# Patient Record
Sex: FEMALE | Race: WHITE | NOT HISPANIC OR LATINO | Employment: OTHER | URBAN - METROPOLITAN AREA
[De-identification: names, ages, dates, MRNs, and addresses within clinical notes are randomized per-mention and may not be internally consistent; named-entity substitution may affect disease eponyms.]

---

## 2017-08-03 ENCOUNTER — HOSPITAL ENCOUNTER (OUTPATIENT)
Dept: FAMILY MEDICINE CLINIC | Facility: CLINIC | Age: 62
Setting detail: SPECIMEN
Discharge: HOME | End: 2017-08-03
Attending: PREVENTIVE MEDICINE | Admitting: PREVENTIVE MEDICINE

## 2017-08-03 LAB
ALBUMIN SERPL-MCNC: 4.1 G/DL (ref 3.5–4.8)
ALBUMIN/GLOB SERPL: 1.4 {RATIO} (ref 1–1.7)
ALP SERPL-CCNC: 78 IU/L (ref 32–91)
ALT SERPL-CCNC: 34 IU/L (ref 14–54)
ANION GAP SERPL CALC-SCNC: 12.4 MMOL/L (ref 10–20)
AST SERPL-CCNC: 25 IU/L (ref 15–41)
BASOPHILS # BLD AUTO: 0 10*3/UL (ref 0–0.2)
BASOPHILS NFR BLD AUTO: 1 % (ref 0–2)
BILIRUB SERPL-MCNC: 0.6 MG/DL (ref 0.3–1.2)
BUN SERPL-MCNC: 11 MG/DL (ref 8–20)
BUN/CREAT SERPL: 12.2 (ref 5.4–26.2)
CALCIUM SERPL-MCNC: 10.1 MG/DL (ref 8.9–10.3)
CHLORIDE SERPL-SCNC: 102 MMOL/L (ref 101–111)
CONV CO2: 29 MMOL/L (ref 22–32)
CONV TOTAL PROTEIN: 7.1 G/DL (ref 6.1–7.9)
CREAT UR-MCNC: 0.9 MG/DL (ref 0.4–1)
DIFFERENTIAL METHOD BLD: (no result)
EOSINOPHIL # BLD AUTO: 0.1 10*3/UL (ref 0–0.3)
EOSINOPHIL # BLD AUTO: 1 % (ref 0–3)
ERYTHROCYTE [DISTWIDTH] IN BLOOD BY AUTOMATED COUNT: 13.9 % (ref 11.5–14.5)
GLOBULIN UR ELPH-MCNC: 3 G/DL (ref 2.5–3.8)
GLUCOSE SERPL-MCNC: 95 MG/DL (ref 65–99)
HCT VFR BLD AUTO: 40.6 % (ref 35–49)
HGB BLD-MCNC: 13.5 G/DL (ref 12–15)
LYMPHOCYTES # BLD AUTO: 1.7 10*3/UL (ref 0.8–4.8)
LYMPHOCYTES NFR BLD AUTO: 24 % (ref 18–42)
MCH RBC QN AUTO: 31.7 PG (ref 26–32)
MCHC RBC AUTO-ENTMCNC: 33.2 G/DL (ref 32–36)
MCV RBC AUTO: 95.3 FL (ref 80–94)
MONOCYTES # BLD AUTO: 0.7 10*3/UL (ref 0.1–1.3)
MONOCYTES NFR BLD AUTO: 10 % (ref 2–11)
NEUTROPHILS # BLD AUTO: 4.4 10*3/UL (ref 2.3–8.6)
NEUTROPHILS NFR BLD AUTO: 64 % (ref 50–75)
NRBC BLD AUTO-RTO: 0 /100{WBCS}
NRBC/RBC NFR BLD MANUAL: 0 10*3/UL
PLATELET # BLD AUTO: 291 10*3/UL (ref 150–450)
PMV BLD AUTO: 9.6 FL (ref 7.4–10.4)
POTASSIUM SERPL-SCNC: 5.4 MMOL/L (ref 3.6–5.1)
RBC # BLD AUTO: 4.26 10*6/UL (ref 4–5.4)
SODIUM SERPL-SCNC: 138 MMOL/L (ref 136–144)
WBC # BLD AUTO: 6.9 10*3/UL (ref 4.5–11.5)

## 2017-08-09 ENCOUNTER — HOSPITAL ENCOUNTER (OUTPATIENT)
Dept: FAMILY MEDICINE CLINIC | Facility: CLINIC | Age: 62
Setting detail: SPECIMEN
Discharge: HOME | End: 2017-08-09
Attending: PREVENTIVE MEDICINE | Admitting: PREVENTIVE MEDICINE

## 2017-08-09 LAB — POTASSIUM SERPL-SCNC: 5.3 MMOL/L (ref 3.6–5.1)

## 2017-11-20 ENCOUNTER — HOSPITAL ENCOUNTER (OUTPATIENT)
Dept: PAIN MEDICINE | Facility: HOSPITAL | Age: 62
Discharge: HOME | End: 2017-11-20
Attending: ANESTHESIOLOGY | Admitting: ANESTHESIOLOGY

## 2018-01-08 ENCOUNTER — HOSPITAL ENCOUNTER (OUTPATIENT)
Dept: PAIN MEDICINE | Facility: HOSPITAL | Age: 63
Discharge: HOME | End: 2018-01-08
Attending: ANESTHESIOLOGY | Admitting: ANESTHESIOLOGY

## 2018-03-12 ENCOUNTER — HOSPITAL ENCOUNTER (OUTPATIENT)
Dept: PAIN MEDICINE | Facility: HOSPITAL | Age: 63
Discharge: HOME | End: 2018-03-12
Attending: ANESTHESIOLOGY | Admitting: ANESTHESIOLOGY

## 2018-04-23 ENCOUNTER — HOSPITAL ENCOUNTER (OUTPATIENT)
Dept: PAIN MEDICINE | Facility: HOSPITAL | Age: 63
Discharge: HOME | End: 2018-04-23
Attending: ANESTHESIOLOGY | Admitting: ANESTHESIOLOGY

## 2018-06-18 ENCOUNTER — HOSPITAL ENCOUNTER (OUTPATIENT)
Dept: PAIN MEDICINE | Facility: HOSPITAL | Age: 63
Discharge: HOME | End: 2018-06-18
Attending: ANESTHESIOLOGY | Admitting: ANESTHESIOLOGY

## 2018-07-17 ENCOUNTER — HOSPITAL ENCOUNTER (OUTPATIENT)
Dept: FAMILY MEDICINE CLINIC | Facility: CLINIC | Age: 63
Setting detail: SPECIMEN
Discharge: HOME | End: 2018-07-17
Attending: PREVENTIVE MEDICINE | Admitting: PREVENTIVE MEDICINE

## 2018-07-17 LAB
ALBUMIN SERPL-MCNC: 4.1 G/DL (ref 3.5–4.8)
ALBUMIN/GLOB SERPL: 1.6 {RATIO} (ref 1–1.7)
ALP SERPL-CCNC: 84 IU/L (ref 32–91)
ALT SERPL-CCNC: 30 IU/L (ref 14–54)
ANION GAP SERPL CALC-SCNC: 13.8 MMOL/L (ref 10–20)
AST SERPL-CCNC: 22 IU/L (ref 15–41)
BASOPHILS # BLD AUTO: 0.1 10*3/UL (ref 0–0.2)
BASOPHILS NFR BLD AUTO: 1 % (ref 0–2)
BILIRUB SERPL-MCNC: 0.6 MG/DL (ref 0.3–1.2)
BUN SERPL-MCNC: 10 MG/DL (ref 8–20)
BUN/CREAT SERPL: 10 (ref 5.4–26.2)
CALCIUM SERPL-MCNC: 9.6 MG/DL (ref 8.9–10.3)
CHLORIDE SERPL-SCNC: 100 MMOL/L (ref 101–111)
CHOLEST SERPL-MCNC: 127 MG/DL
CHOLEST/HDLC SERPL: 3.7 {RATIO}
CONV CO2: 27 MMOL/L (ref 22–32)
CONV LDL CHOLESTEROL DIRECT: 66 MG/DL (ref 0–100)
CONV TOTAL PROTEIN: 6.7 G/DL (ref 6.1–7.9)
CREAT UR-MCNC: 1 MG/DL (ref 0.4–1)
DIFFERENTIAL METHOD BLD: (no result)
EOSINOPHIL # BLD AUTO: 0.1 10*3/UL (ref 0–0.3)
EOSINOPHIL # BLD AUTO: 2 % (ref 0–3)
ERYTHROCYTE [DISTWIDTH] IN BLOOD BY AUTOMATED COUNT: 12.9 % (ref 11.5–14.5)
GLOBULIN UR ELPH-MCNC: 2.6 G/DL (ref 2.5–3.8)
GLUCOSE SERPL-MCNC: 87 MG/DL (ref 65–99)
HCT VFR BLD AUTO: 40.3 % (ref 35–49)
HDLC SERPL-MCNC: 34 MG/DL
HGB BLD-MCNC: 13.3 G/DL (ref 12–15)
LDLC/HDLC SERPL: 1.9 {RATIO}
LIPID INTERPRETATION: ABNORMAL
LYMPHOCYTES # BLD AUTO: 2.4 10*3/UL (ref 0.8–4.8)
LYMPHOCYTES NFR BLD AUTO: 27 % (ref 18–42)
MCH RBC QN AUTO: 31.2 PG (ref 26–32)
MCHC RBC AUTO-ENTMCNC: 33 G/DL (ref 32–36)
MCV RBC AUTO: 94.6 FL (ref 80–94)
MONOCYTES # BLD AUTO: 0.7 10*3/UL (ref 0.1–1.3)
MONOCYTES NFR BLD AUTO: 8 % (ref 2–11)
NEUTROPHILS # BLD AUTO: 5.5 10*3/UL (ref 2.3–8.6)
NEUTROPHILS NFR BLD AUTO: 62 % (ref 50–75)
NRBC BLD AUTO-RTO: 0 /100{WBCS}
NRBC/RBC NFR BLD MANUAL: 0 10*3/UL
PLATELET # BLD AUTO: 340 10*3/UL (ref 150–450)
PMV BLD AUTO: 9.4 FL (ref 7.4–10.4)
POTASSIUM SERPL-SCNC: 3.8 MMOL/L (ref 3.6–5.1)
RBC # BLD AUTO: 4.26 10*6/UL (ref 4–5.4)
SODIUM SERPL-SCNC: 137 MMOL/L (ref 136–144)
TRIGL SERPL-MCNC: 203 MG/DL
VLDLC SERPL CALC-MCNC: 26.9 MG/DL
WBC # BLD AUTO: 8.7 10*3/UL (ref 4.5–11.5)

## 2018-07-18 LAB
CONV HIV-1/ HIV-2: NORMAL
CONV HIV-1/ HIV-2: NORMAL
HCV AB SER DONR QL: NORMAL
HCV AB SER DONR QL: NORMAL

## 2018-07-30 ENCOUNTER — HOSPITAL ENCOUNTER (OUTPATIENT)
Dept: PAIN MEDICINE | Facility: HOSPITAL | Age: 63
Discharge: HOME | End: 2018-07-30
Attending: ANESTHESIOLOGY | Admitting: ANESTHESIOLOGY

## 2018-08-30 ENCOUNTER — HOSPITAL ENCOUNTER (OUTPATIENT)
Dept: FAMILY MEDICINE CLINIC | Facility: CLINIC | Age: 63
Setting detail: SPECIMEN
Discharge: HOME | End: 2018-08-30
Attending: PREVENTIVE MEDICINE | Admitting: PREVENTIVE MEDICINE

## 2018-08-30 LAB — CREAT UR-MCNC: 1 MG/DL (ref 0.4–1)

## 2018-09-17 ENCOUNTER — HOSPITAL ENCOUNTER (OUTPATIENT)
Dept: PAIN MEDICINE | Facility: HOSPITAL | Age: 63
Discharge: HOME | End: 2018-09-17
Attending: ANESTHESIOLOGY | Admitting: ANESTHESIOLOGY

## 2018-11-12 ENCOUNTER — HOSPITAL ENCOUNTER (OUTPATIENT)
Dept: PAIN MEDICINE | Facility: HOSPITAL | Age: 63
Discharge: HOME | End: 2018-11-12
Attending: ANESTHESIOLOGY | Admitting: ANESTHESIOLOGY

## 2019-01-07 ENCOUNTER — HOSPITAL ENCOUNTER (OUTPATIENT)
Dept: PAIN MEDICINE | Facility: HOSPITAL | Age: 64
Discharge: HOME | End: 2019-01-07
Attending: ANESTHESIOLOGY | Admitting: ANESTHESIOLOGY

## 2019-01-14 ENCOUNTER — HOSPITAL ENCOUNTER (OUTPATIENT)
Dept: FAMILY MEDICINE CLINIC | Facility: CLINIC | Age: 64
Setting detail: SPECIMEN
Discharge: HOME | End: 2019-01-14
Attending: PREVENTIVE MEDICINE | Admitting: PREVENTIVE MEDICINE

## 2019-01-14 LAB
ALBUMIN SERPL-MCNC: 4.1 G/DL (ref 3.5–4.8)
ALBUMIN/GLOB SERPL: 1.4 {RATIO} (ref 1–1.7)
ALP SERPL-CCNC: 74 IU/L (ref 32–91)
ALT SERPL-CCNC: 34 IU/L (ref 14–54)
ANION GAP SERPL CALC-SCNC: 14 MMOL/L (ref 10–20)
AST SERPL-CCNC: 28 IU/L (ref 15–41)
BASOPHILS # BLD AUTO: 0.1 10*3/UL (ref 0–0.2)
BASOPHILS NFR BLD AUTO: 1 % (ref 0–2)
BILIRUB SERPL-MCNC: 0.5 MG/DL (ref 0.3–1.2)
BUN SERPL-MCNC: 19 MG/DL (ref 8–20)
BUN/CREAT SERPL: 19 (ref 5.4–26.2)
CALCIUM SERPL-MCNC: 9.9 MG/DL (ref 8.9–10.3)
CHLORIDE SERPL-SCNC: 102 MMOL/L (ref 101–111)
CHOLEST SERPL-MCNC: 135 MG/DL
CHOLEST/HDLC SERPL: 3.2 {RATIO}
CONV CO2: 25 MMOL/L (ref 22–32)
CONV LDL CHOLESTEROL DIRECT: 75 MG/DL (ref 0–100)
CONV TOTAL PROTEIN: 7.1 G/DL (ref 6.1–7.9)
CREAT UR-MCNC: 1 MG/DL (ref 0.4–1)
DIFFERENTIAL METHOD BLD: (no result)
EOSINOPHIL # BLD AUTO: 0.1 10*3/UL (ref 0–0.3)
EOSINOPHIL # BLD AUTO: 1 % (ref 0–3)
ERYTHROCYTE [DISTWIDTH] IN BLOOD BY AUTOMATED COUNT: 13.7 % (ref 11.5–14.5)
GLOBULIN UR ELPH-MCNC: 3 G/DL (ref 2.5–3.8)
GLUCOSE SERPL-MCNC: 95 MG/DL (ref 65–99)
HCT VFR BLD AUTO: 41.4 % (ref 35–49)
HDLC SERPL-MCNC: 42 MG/DL
HGB BLD-MCNC: 13.9 G/DL (ref 12–15)
LDLC/HDLC SERPL: 1.8 {RATIO}
LIPID INTERPRETATION: ABNORMAL
LYMPHOCYTES # BLD AUTO: 1.7 10*3/UL (ref 0.8–4.8)
LYMPHOCYTES NFR BLD AUTO: 16 % (ref 18–42)
MCH RBC QN AUTO: 32 PG (ref 26–32)
MCHC RBC AUTO-ENTMCNC: 33.7 G/DL (ref 32–36)
MCV RBC AUTO: 95.1 FL (ref 80–94)
MONOCYTES # BLD AUTO: 0.9 10*3/UL (ref 0.1–1.3)
MONOCYTES NFR BLD AUTO: 8 % (ref 2–11)
NEUTROPHILS # BLD AUTO: 8.3 10*3/UL (ref 2.3–8.6)
NEUTROPHILS NFR BLD AUTO: 74 % (ref 50–75)
NRBC BLD AUTO-RTO: 0 /100{WBCS}
NRBC/RBC NFR BLD MANUAL: 0 10*3/UL
PLATELET # BLD AUTO: 368 10*3/UL (ref 150–450)
PMV BLD AUTO: 9 FL (ref 7.4–10.4)
POTASSIUM SERPL-SCNC: 4 MMOL/L (ref 3.6–5.1)
RBC # BLD AUTO: 4.35 10*6/UL (ref 4–5.4)
SODIUM SERPL-SCNC: 137 MMOL/L (ref 136–144)
TRIGL SERPL-MCNC: 191 MG/DL
VLDLC SERPL CALC-MCNC: 18.6 MG/DL
WBC # BLD AUTO: 11 10*3/UL (ref 4.5–11.5)

## 2019-03-04 ENCOUNTER — HOSPITAL ENCOUNTER (OUTPATIENT)
Dept: PAIN MEDICINE | Facility: HOSPITAL | Age: 64
Discharge: HOME | End: 2019-03-04
Attending: ANESTHESIOLOGY | Admitting: ANESTHESIOLOGY

## 2019-03-12 ENCOUNTER — HOSPITAL ENCOUNTER (OUTPATIENT)
Dept: FAMILY MEDICINE CLINIC | Facility: CLINIC | Age: 64
Setting detail: SPECIMEN
Discharge: HOME | End: 2019-03-12
Attending: PREVENTIVE MEDICINE | Admitting: PREVENTIVE MEDICINE

## 2019-04-01 ENCOUNTER — HOSPITAL ENCOUNTER (OUTPATIENT)
Dept: PAIN MEDICINE | Facility: HOSPITAL | Age: 64
End: 2019-04-01
Attending: ANESTHESIOLOGY | Admitting: ANESTHESIOLOGY

## 2019-04-29 ENCOUNTER — HOSPITAL ENCOUNTER (OUTPATIENT)
Dept: PAIN MEDICINE | Facility: HOSPITAL | Age: 64
Discharge: HOME | End: 2019-04-29
Attending: ANESTHESIOLOGY | Admitting: ANESTHESIOLOGY

## 2019-05-15 ENCOUNTER — HOSPITAL ENCOUNTER (OUTPATIENT)
Dept: PAIN MEDICINE | Facility: HOSPITAL | Age: 64
Discharge: HOME | End: 2019-05-15
Attending: PHYSICAL MEDICINE & REHABILITATION | Admitting: PHYSICAL MEDICINE & REHABILITATION

## 2019-07-08 RX ORDER — SULINDAC 200 MG/1
200 TABLET ORAL DAILY
Qty: 90 TABLET | Refills: 3 | Status: CANCELLED | OUTPATIENT
Start: 2019-07-08

## 2019-07-08 RX ORDER — HYDROCODONE BITARTRATE AND ACETAMINOPHEN 7.5; 325 MG/1; MG/1
TABLET ORAL
COMMUNITY
Start: 2019-03-04 | End: 2019-07-08 | Stop reason: SDUPTHER

## 2019-07-08 RX ORDER — SULINDAC 200 MG/1
1 TABLET ORAL DAILY
COMMUNITY
Start: 2017-11-03 | End: 2019-07-08 | Stop reason: SDUPTHER

## 2019-07-08 RX ORDER — SULINDAC 200 MG/1
200 TABLET ORAL DAILY
Qty: 90 TABLET | Refills: 0 | Status: SHIPPED | OUTPATIENT
Start: 2019-07-08 | End: 2019-10-04 | Stop reason: SDUPTHER

## 2019-07-11 ENCOUNTER — APPOINTMENT (OUTPATIENT)
Dept: PAIN MEDICINE | Facility: CLINIC | Age: 64
End: 2019-07-11

## 2019-07-11 PROBLEM — J45.909 ASTHMA: Status: ACTIVE | Noted: 2019-07-11

## 2019-07-11 PROBLEM — Z80.1 FAMILY HISTORY OF LUNG CANCER: Status: ACTIVE | Noted: 2019-07-11

## 2019-07-11 PROBLEM — F32.A DEPRESSION: Status: ACTIVE | Noted: 2019-07-11

## 2019-07-11 PROBLEM — M19.90 ARTHRITIS: Status: ACTIVE | Noted: 2019-03-12

## 2019-07-11 PROBLEM — E66.9 OBESITY WITH BODY MASS INDEX 30 OR GREATER: Status: ACTIVE | Noted: 2017-06-12

## 2019-07-11 PROBLEM — I10 HYPERTENSION: Status: ACTIVE | Noted: 2019-07-11

## 2019-07-11 PROBLEM — G89.29 CHRONIC PAIN: Status: ACTIVE | Noted: 2019-04-29

## 2019-07-11 PROBLEM — K59.03 CONSTIPATION DUE TO PAIN MEDICATION: Status: ACTIVE | Noted: 2019-07-11

## 2019-07-11 PROBLEM — M47.817 OSTEOARTHRITIS OF LUMBOSACRAL SPINE WITHOUT MYELOPATHY: Status: ACTIVE | Noted: 2019-04-29

## 2019-07-11 PROBLEM — M81.0 OSTEOPOROSIS: Status: ACTIVE | Noted: 2019-07-11

## 2019-07-11 PROBLEM — E53.8 B12 DEFICIENCY: Status: ACTIVE | Noted: 2019-01-14

## 2019-07-11 PROBLEM — Z87.891 FORMER SMOKER: Status: ACTIVE | Noted: 2018-07-16

## 2019-07-11 PROBLEM — R73.9 HYPERGLYCEMIA: Status: ACTIVE | Noted: 2017-01-26

## 2019-07-11 RX ORDER — HYDROCODONE BITARTRATE AND ACETAMINOPHEN 7.5; 325 MG/1; MG/1
1 TABLET ORAL EVERY 8 HOURS PRN
Qty: 90 TABLET | Refills: 0 | Status: SHIPPED | OUTPATIENT
Start: 2019-07-11 | End: 2019-08-10

## 2019-07-11 RX ORDER — HYDROCODONE BITARTRATE AND ACETAMINOPHEN 7.5; 325 MG/1; MG/1
1 TABLET ORAL EVERY 8 HOURS PRN
Qty: 90 TABLET | Refills: 0 | Status: SHIPPED | OUTPATIENT
Start: 2019-07-11 | End: 2019-07-11 | Stop reason: SDUPTHER

## 2019-07-12 ENCOUNTER — RESULTS ENCOUNTER (OUTPATIENT)
Dept: FAMILY MEDICINE CLINIC | Facility: CLINIC | Age: 64
End: 2019-07-12

## 2019-07-12 ENCOUNTER — OFFICE VISIT (OUTPATIENT)
Dept: FAMILY MEDICINE CLINIC | Facility: CLINIC | Age: 64
End: 2019-07-12

## 2019-07-12 VITALS
HEIGHT: 63 IN | HEART RATE: 77 BPM | TEMPERATURE: 98 F | DIASTOLIC BLOOD PRESSURE: 68 MMHG | BODY MASS INDEX: 35.54 KG/M2 | WEIGHT: 200.6 LBS | SYSTOLIC BLOOD PRESSURE: 106 MMHG | RESPIRATION RATE: 18 BRPM | OXYGEN SATURATION: 96 %

## 2019-07-12 DIAGNOSIS — R73.9 HYPERGLYCEMIA: ICD-10-CM

## 2019-07-12 DIAGNOSIS — Z12.11 SCREENING FOR COLON CANCER: ICD-10-CM

## 2019-07-12 DIAGNOSIS — E55.9 VITAMIN D DEFICIENCY: ICD-10-CM

## 2019-07-12 DIAGNOSIS — M81.0 OSTEOPOROSIS WITHOUT CURRENT PATHOLOGICAL FRACTURE, UNSPECIFIED OSTEOPOROSIS TYPE: ICD-10-CM

## 2019-07-12 DIAGNOSIS — E78.5 HYPERLIPIDEMIA, UNSPECIFIED HYPERLIPIDEMIA TYPE: ICD-10-CM

## 2019-07-12 DIAGNOSIS — Z87.891 FORMER SMOKER: ICD-10-CM

## 2019-07-12 DIAGNOSIS — I10 ESSENTIAL HYPERTENSION: ICD-10-CM

## 2019-07-12 DIAGNOSIS — E53.8 B12 DEFICIENCY: Primary | ICD-10-CM

## 2019-07-12 LAB
ALBUMIN SERPL-MCNC: 4.2 G/DL (ref 3.5–4.8)
ALBUMIN/GLOB SERPL: 1.9 G/DL (ref 1–1.7)
ALP SERPL-CCNC: 62 U/L (ref 32–91)
ALT SERPL W P-5'-P-CCNC: 28 U/L (ref 14–54)
ANION GAP SERPL CALCULATED.3IONS-SCNC: 14.2 MMOL/L (ref 5–15)
ARTICHOKE IGE QN: 61 MG/DL (ref 0–100)
AST SERPL-CCNC: 20 U/L (ref 15–41)
BASOPHILS # BLD AUTO: 0.1 10*3/MM3 (ref 0–0.2)
BASOPHILS NFR BLD AUTO: 0.9 % (ref 0–1.5)
BILIRUB SERPL-MCNC: 0.7 MG/DL (ref 0.3–1.2)
BUN BLD-MCNC: 18 MG/DL (ref 8–20)
BUN/CREAT SERPL: 18 (ref 5.4–26.2)
CALCIUM SPEC-SCNC: 9.8 MG/DL (ref 8.9–10.3)
CHLORIDE SERPL-SCNC: 105 MMOL/L (ref 101–111)
CHOLEST SERPL-MCNC: 117 MG/DL
CO2 SERPL-SCNC: 23 MMOL/L (ref 22–32)
CREAT BLD-MCNC: 1 MG/DL (ref 0.4–1)
DEPRECATED RDW RBC AUTO: 45.1 FL (ref 37–54)
EOSINOPHIL # BLD AUTO: 0.1 10*3/MM3 (ref 0–0.4)
EOSINOPHIL NFR BLD AUTO: 1.2 % (ref 0.3–6.2)
ERYTHROCYTE [DISTWIDTH] IN BLOOD BY AUTOMATED COUNT: 13.6 % (ref 12.3–15.4)
GFR SERPL CREATININE-BSD FRML MDRD: 56 ML/MIN/1.73
GLOBULIN UR ELPH-MCNC: 2.2 GM/DL (ref 2.5–3.8)
GLUCOSE BLD-MCNC: 93 MG/DL (ref 65–99)
HCT VFR BLD AUTO: 41.2 % (ref 34–46.6)
HDLC SERPL QL: 3.25
HDLC SERPL-MCNC: 36 MG/DL
HGB BLD-MCNC: 13.8 G/DL (ref 12–15.9)
LDLC/HDLC SERPL: 1.31 {RATIO}
LYMPHOCYTES # BLD AUTO: 1.6 10*3/MM3 (ref 0.7–3.1)
LYMPHOCYTES NFR BLD AUTO: 17.7 % (ref 19.6–45.3)
MCH RBC QN AUTO: 31.9 PG (ref 26.6–33)
MCHC RBC AUTO-ENTMCNC: 33.5 G/DL (ref 31.5–35.7)
MCV RBC AUTO: 95.2 FL (ref 79–97)
MONOCYTES # BLD AUTO: 0.6 10*3/MM3 (ref 0.1–0.9)
MONOCYTES NFR BLD AUTO: 7.1 % (ref 5–12)
NEUTROPHILS # BLD AUTO: 6.7 10*3/MM3 (ref 1.7–7)
NEUTROPHILS NFR BLD AUTO: 73.1 % (ref 42.7–76)
NRBC BLD AUTO-RTO: 0.1 /100 WBC (ref 0–0.2)
PLATELET # BLD AUTO: 329 10*3/MM3 (ref 140–450)
PMV BLD AUTO: 9.9 FL (ref 6–12)
POTASSIUM BLD-SCNC: 4.2 MMOL/L (ref 3.6–5.1)
PROT SERPL-MCNC: 6.4 G/DL (ref 6.1–7.9)
RBC # BLD AUTO: 4.33 10*6/MM3 (ref 3.77–5.28)
SODIUM BLD-SCNC: 138 MMOL/L (ref 136–144)
TRIGL SERPL-MCNC: 169 MG/DL
VIT B12 BLD-MCNC: >1500 PG/ML (ref 180–914)
VLDLC SERPL-MCNC: 33.8 MG/DL
WBC NRBC COR # BLD: 9.2 10*3/MM3 (ref 3.4–10.8)

## 2019-07-12 PROCEDURE — 80053 COMPREHEN METABOLIC PANEL: CPT | Performed by: PREVENTIVE MEDICINE

## 2019-07-12 PROCEDURE — 82306 VITAMIN D 25 HYDROXY: CPT | Performed by: PREVENTIVE MEDICINE

## 2019-07-12 PROCEDURE — 82607 VITAMIN B-12: CPT | Performed by: PREVENTIVE MEDICINE

## 2019-07-12 PROCEDURE — 80061 LIPID PANEL: CPT | Performed by: PREVENTIVE MEDICINE

## 2019-07-12 PROCEDURE — 99213 OFFICE O/P EST LOW 20 MIN: CPT | Performed by: PREVENTIVE MEDICINE

## 2019-07-12 PROCEDURE — 85025 COMPLETE CBC W/AUTO DIFF WBC: CPT | Performed by: PREVENTIVE MEDICINE

## 2019-07-12 PROCEDURE — 36415 COLL VENOUS BLD VENIPUNCTURE: CPT | Performed by: PREVENTIVE MEDICINE

## 2019-07-12 RX ORDER — GABAPENTIN 800 MG/1
1 TABLET ORAL 3 TIMES DAILY
COMMUNITY
Start: 2019-05-13 | End: 2019-11-15 | Stop reason: SDUPTHER

## 2019-07-12 RX ORDER — LANOLIN ALCOHOL/MO/W.PET/CERES
1000 CREAM (GRAM) TOPICAL DAILY
COMMUNITY
End: 2020-06-23

## 2019-07-12 RX ORDER — LISINOPRIL AND HYDROCHLOROTHIAZIDE 25; 20 MG/1; MG/1
1 TABLET ORAL DAILY
COMMUNITY
Start: 2019-06-03 | End: 2020-06-04

## 2019-07-12 RX ORDER — DULOXETIN HYDROCHLORIDE 60 MG/1
1 CAPSULE, DELAYED RELEASE ORAL DAILY
COMMUNITY
Start: 2019-06-03 | End: 2020-06-04

## 2019-07-12 RX ORDER — CYCLOBENZAPRINE HCL 10 MG
1 TABLET ORAL DAILY
Refills: 1 | COMMUNITY
Start: 2019-06-30 | End: 2020-04-15 | Stop reason: SDUPTHER

## 2019-07-12 RX ORDER — ATORVASTATIN CALCIUM 40 MG/1
1 TABLET, FILM COATED ORAL NIGHTLY
COMMUNITY
Start: 2019-05-13 | End: 2020-01-15 | Stop reason: SDUPTHER

## 2019-07-12 RX ORDER — MULTIVITAMIN WITH IRON
300 TABLET ORAL DAILY
COMMUNITY
Start: 2012-04-17

## 2019-07-12 NOTE — PROGRESS NOTES
"Subjective   Sherrie Chand is a 64 y.o. female presents for   Chief Complaint   Patient presents with   • Hypertension     fasting    • Hyperlipidemia   Pain back after surgery Weight decreased 3 #-Is starting water therapy    Health Maintenance Due   Topic Date Due   • TDAP/TD VACCINES (1 - Tdap) 01/20/1976   • ZOSTER VACCINE (1 of 2) 01/20/2007   • COLONOSCOPY  06/19/2019   • DXA SCAN  07/08/2019       History of Present Illness     Vitals:    07/12/19 0807 07/12/19 0809   BP: 100/61 106/68   BP Location: Right arm Left arm   Patient Position: Sitting Sitting   Cuff Size: Large Adult Large Adult   Pulse: 77    Resp: 18    Temp: 98 °F (36.7 °C)    TempSrc: Oral    SpO2: 96%    Weight: 91 kg (200 lb 9.6 oz)    Height: 160 cm (63\")        Current Outpatient Medications on File Prior to Visit   Medication Sig Dispense Refill   • atorvastatin (LIPITOR) 40 MG tablet Take 1 tablet by mouth Every Night.     • Calcium Carbonate-Vitamin D 600-400 MG-UNIT chewable tablet Chew 1 tablet Daily.     • cyclobenzaprine (FLEXERIL) 10 MG tablet Take 1 tablet by mouth Daily.  1   • DULoxetine (CYMBALTA) 60 MG capsule Take 1 capsule by mouth Daily.     • gabapentin (NEURONTIN) 800 MG tablet Take 1 tablet by mouth 3 (Three) Times a Day.     • HYDROcodone-acetaminophen (NORCO) 7.5-325 MG per tablet Take 1 tablet by mouth Every 8 (Eight) Hours As Needed for Moderate Pain  for up to 30 days. 90 tablet 0   • lisinopril-hydrochlorothiazide (PRINZIDE,ZESTORETIC) 20-25 MG per tablet Take 1 tablet by mouth Daily.     • St Johns Wort 150 MG capsule Take 1 capsule by mouth Daily.     • sulindac (CLINORIL) 200 MG tablet Take 1 tablet by mouth Daily. 90 tablet 0   • vitamin B-12 (CYANOCOBALAMIN) 1000 MCG tablet Take 1,000 mcg by mouth Daily.     • vitamin B-6 (PYRIDOXINE) 100 MG tablet Take 300 mg by mouth Daily.       No current facility-administered medications on file prior to visit.        The following portions of the patient's history " were reviewed and updated as appropriate: allergies, current medications, past family history, past medical history, past social history, past surgical history and problem list.    Review of Systems   Constitutional: Negative.    HENT: Negative.  Negative for sinus pressure and sore throat.    Eyes: Negative.    Respiratory: Negative.  Negative for cough.    Cardiovascular: Negative.    Gastrointestinal: Negative.    Endocrine: Negative.    Genitourinary: Negative.    Musculoskeletal: Positive for back pain and gait problem.   Skin: Negative.    Allergic/Immunologic: Positive for environmental allergies.   Hematological: Negative.    Psychiatric/Behavioral: Negative.        Objective   Physical Exam   Constitutional: She is oriented to person, place, and time. She appears well-developed.   HENT:   Head: Normocephalic and atraumatic.   Eyes: Conjunctivae and EOM are normal. Pupils are equal, round, and reactive to light.   Neck: Normal range of motion.   Cardiovascular: Normal rate and regular rhythm.   Pulmonary/Chest: Effort normal and breath sounds normal.   Abdominal: Soft. Bowel sounds are normal.   Musculoskeletal:   Generalized low back tenderness and gait halting   Lymphadenopathy:     She has no cervical adenopathy.   Neurological: She is alert and oriented to person, place, and time.   Skin: Skin is warm and dry.   Psychiatric: She has a normal mood and affect.   Nursing note and vitals reviewed.    PHQ-9 Total Score:      Assessment/Plan   Sherrie was seen today for hypertension and hyperlipidemia.    Diagnoses and all orders for this visit:    B12 deficiency  -     CBC Auto Differential  -     Vitamin B12    Former smoker    Hyperglycemia  -     Comprehensive Metabolic Panel  -     TSH    Hyperlipidemia, unspecified hyperlipidemia type  -     Lipid Panel    Essential hypertension    Vitamin D deficiency  -     Vitamin D 25 Hydroxy    Osteoporosis without current pathological fracture, unspecified  osteoporosis type  -     DEXA Bone Density Axial; Future    Screening for colon cancer    Other orders  -     Cancel: Hemoglobin A1c        Patient Instructions   Advise eye exam.  Advise Rashi and [place] at pharmacy.

## 2019-07-15 LAB — 25(OH)D3 SERPL-MCNC: 33.8 NG/ML (ref 30–100)

## 2019-07-24 ENCOUNTER — APPOINTMENT (OUTPATIENT)
Dept: BONE DENSITY | Facility: HOSPITAL | Age: 64
End: 2019-07-24

## 2019-09-20 ENCOUNTER — OFFICE VISIT (OUTPATIENT)
Dept: PAIN MEDICINE | Facility: CLINIC | Age: 64
End: 2019-09-20

## 2019-09-20 VITALS
WEIGHT: 202 LBS | OXYGEN SATURATION: 96 % | HEART RATE: 79 BPM | DIASTOLIC BLOOD PRESSURE: 62 MMHG | HEIGHT: 65 IN | BODY MASS INDEX: 33.66 KG/M2 | SYSTOLIC BLOOD PRESSURE: 90 MMHG | RESPIRATION RATE: 16 BRPM | TEMPERATURE: 97.5 F

## 2019-09-20 DIAGNOSIS — G89.4 CHRONIC PAIN SYNDROME: Primary | ICD-10-CM

## 2019-09-20 DIAGNOSIS — M79.18 MYOFASCIAL PAIN SYNDROME: ICD-10-CM

## 2019-09-20 DIAGNOSIS — M25.50 POLYARTHRALGIA: ICD-10-CM

## 2019-09-20 DIAGNOSIS — M47.817 LUMBOSACRAL SPONDYLOSIS WITHOUT MYELOPATHY: ICD-10-CM

## 2019-09-20 DIAGNOSIS — F19.90 CURRENT DRUG USE: Primary | ICD-10-CM

## 2019-09-20 DIAGNOSIS — Z79.899 HIGH RISK MEDICATION USE: ICD-10-CM

## 2019-09-20 DIAGNOSIS — M46.1 SACROILIITIS (HCC): ICD-10-CM

## 2019-09-20 DIAGNOSIS — M54.16 LUMBAR RADICULITIS: ICD-10-CM

## 2019-09-20 PROCEDURE — 99214 OFFICE O/P EST MOD 30 MIN: CPT | Performed by: ANESTHESIOLOGY

## 2019-09-20 PROCEDURE — G0463 HOSPITAL OUTPT CLINIC VISIT: HCPCS | Performed by: ANESTHESIOLOGY

## 2019-09-20 RX ORDER — HYDROCODONE BITARTRATE AND ACETAMINOPHEN 7.5; 325 MG/1; MG/1
1 TABLET ORAL 3 TIMES DAILY PRN
Qty: 90 TABLET | Refills: 0 | Status: SHIPPED | OUTPATIENT
Start: 2019-09-20 | End: 2019-11-15 | Stop reason: SDUPTHER

## 2019-09-20 RX ORDER — HYDROCODONE BITARTRATE AND ACETAMINOPHEN 7.5; 325 MG/1; MG/1
1 TABLET ORAL 3 TIMES DAILY PRN
Qty: 90 TABLET | Refills: 0 | Status: SHIPPED | OUTPATIENT
Start: 2019-09-20 | End: 2019-09-20 | Stop reason: SDUPTHER

## 2019-09-20 RX ORDER — HYDROCODONE BITARTRATE AND ACETAMINOPHEN 7.5; 325 MG/1; MG/1
1 TABLET ORAL EVERY 6 HOURS PRN
COMMUNITY
End: 2019-09-20 | Stop reason: SDUPTHER

## 2019-09-20 NOTE — PROGRESS NOTES
CC Lower back pain    64-year-old female with chronic back pain here for follow-up.     Chronic back pain radiating to bilateral lower extremity worse on the left with aching numbness in both legs worse with standing walking or activity.  Denies new weakness saddle anesthesia bladder bowel incontinence.  Worsening right hip/right gluteal pain denies weakness or injury.    Pain interfering with daily activity and sleep.  Marginal relief with physical therapy or anti-inflammatories.      Utilizes hydrocodone with good relief functional benefit and side effects.    Marginal relief with caudal JESSICA in the past    L-spine MRI 2016: Multilevel degenerative disc disease and degenerative facet change as well as  multilevel postoperative changes detailed above.  There is a 14 mm of anterolisthesis of L4 on L5 which has worsened from the prior exam where this was only 4 mm.  This results in severe bilateral neural foraminal narrowing but no spinal canal stenosis.    Pain Assessment   Cause of Pain: surgery  Location of Pain: Lower Back, R Hip, L Hip, R Leg, L Leg  Description of Pain: Dull/Aching, Throbbing, Stabbing  Previous Pain Rating : 7  Current Pain Ratin  Aggravating Factors: Activity  Alleviating Factors: Rest, Medication  Previous Treatments: Epidural Steroids, Narcotic Pain Medication, Physical Therapy  Previous Diagnostic Studies: MRI      Past Medical History:        depression        arthritis        osteoprorsis        anterior cervical discectomy with fusion C 5-6        direct lateral interbody fusion implantation of L 2-3 with right sided approach        exploration with instrumentation removal, corpectomy C-5, with discectomy C 3-4, anterior cervical fusion C 3-6        right L 3-4 hemilaminectomy, repeat discectomy        right L 3-4 discectomy, right L 5 - S -1 decompression        transforaminal lumbar interbody fusion L-4, S-1    Past Surgical History:        Corpectomy C5        Anterior cervical  disectomy C3-C4/Ant. plating C3-C6 for non-union        Removal of cervical plate 9-2011        Vaginal hysterectomy        Appendectomy        Interior and posterior back surgery 2017  Social History     Tobacco Use   • Smoking status: Former Smoker     Types: Cigarettes   • Smokeless tobacco: Never Used   Substance Use Topics   • Alcohol use: No     Frequency: Never         Review of Systems        See HPI    General       Denies fever/chills, fatigue and sleep problems.    Eyes       Denies blurry vision and double vision.    ENT       Denies decreased hearing, sore throat and ears ringing.    CV       Denies chest pain and fainting.    Resp       Denies shortness of breath and cough.    GI       Denies heartburn, constipation, nausea, vomiting and diarrhea.           Denies pain on urination, incontinence and increased frequency.    MS       low back pain bilateral upper extremity    Derm       Denies rash and itching.    Neuro       Denies numbness, tingling, loss of balance and history of seizures.    Psych       Denies anxiety and depression.    Endo       Denies weight change and thirsty all the time.    Heme       Denies easy bruising, bleeding and enlarged lymph nodes.    Physical Exam   General  General appearance:  no acute distress  Gait and station: antalgic  Mental Status Exam   Mental Status: AAO x3  Behavior: Appropriate    Cervical   ROM decreased w/ lateral rotation  Spurling's Test Negative  Palpation: Tender  Paracervical    Thoracolumbar   ROM: decreased rotation/extension  Straight Leg Raise: positive, left  Palpation: Tender  Facets, Paravertebral  Neuro   Reflexes: R Arm 2+  L arm 2+  R Leg 2+  L Leg 3+    Strength: Arms Normal  Strength: Legs Normal   strength intact and symmetrical bilateral upper and lower extremity 5/5      Eyes:      Clear conjunctivae,      Pupils rounds and reactive  ENT:      Clear oropharynx,       Mucosa moist/pink       Nose: no deformity  Chest Wall:      Non  tender      No deformities or masses noted.    Respiratory:      Clear bilaterally to auscultation.        No dyspnea  Heart:      Regular rate and rhythm, no murmurs, rubs, or gallops   Pulses:      Pulses 2+, symmetric  Extremities:      No clubbing, cyanosis, edema, or deformity noted   Neurologic:      No focal deficits      Coordination intact with rapid alternating movement.  Skin:      Intact without lesions or rashes.  No mass  Cervical Nodes:      No adenopathy noted.      Global pain scale on chart                     opioid risk tool low risk        Assessment /Plan     Sherrie was seen today for back pain and follow-up.    Diagnoses and all orders for this visit:    Chronic pain syndrome    Lumbosacral spondylosis without myelopathy    Lumbar radiculitis    Myofascial pain syndrome    Polyarthralgia    Sacroiliitis (CMS/HCC)    High risk medication use    Other orders  -     Discontinue: HYDROcodone-acetaminophen (NORCO) 7.5-325 MG per tablet; Take 1 tablet by mouth 3 (Three) Times a Day As Needed for Moderate Pain .  -     HYDROcodone-acetaminophen (NORCO) 7.5-325 MG per tablet; Take 1 tablet by mouth 3 (Three) Times a Day As Needed for Moderate Pain .    Summary   64-year-old female with chronic back pain here for follow-up.    chronic pain from lumbar DDD spondylosis with radicular pain.  Chronic polyarthralgia.    Functional benefit hydrocodone.  Declines repeat LESI at this time.      Worsening right hip/right gluteal pain from sacroiliitis.  Consider right SI injections.    Continue hydrocodone 7.5/25 3 times daily.  UDS and inspect reviewed.  Discussed risk of tolerance, dependence, respiratory depression, coma and death associated with use of oral opioids for treatment of chronic nonmalignant pain.      Continue gabapentin/flexeril.      RTC 2 weeks      Answers for HPI/ROS submitted by the patient on 9/13/2019   Back pain  Chronicity: chronic  Onset: more than 1 year ago  Frequency:  constantly  Progression since onset: waxing and waning  Pain location: sacro-iliac  Pain quality: aching, burning, cramping, shooting, stabbing  Radiates to: right foot, right thigh  Pain - numeric: 7/10  Pain is: the same all the time  Aggravated by: bending, position, sitting, standing  Stiffness is present: all day  abdominal pain: No  bladder incontinence: No  bowel incontinence: No  chest pain: No  dysuria: No  fever: No  headaches: No  leg pain: Yes  numbness: Yes  paresis: No  paresthesias: Yes  pelvic pain: No  perianal numbness: No  tingling: Yes  weakness: Yes  weight loss: No  Risk factors: history of osteoporosis

## 2019-10-04 RX ORDER — SULINDAC 200 MG/1
200 TABLET ORAL DAILY
Qty: 90 TABLET | Refills: 3 | Status: SHIPPED | OUTPATIENT
Start: 2019-10-04 | End: 2020-09-08

## 2019-10-31 ENCOUNTER — HOSPITAL ENCOUNTER (OUTPATIENT)
Dept: BONE DENSITY | Facility: HOSPITAL | Age: 64
Discharge: HOME | End: 2019-10-31
Admitting: PREVENTIVE MEDICINE

## 2019-10-31 DIAGNOSIS — M81.0 OSTEOPOROSIS WITHOUT CURRENT PATHOLOGICAL FRACTURE, UNSPECIFIED OSTEOPOROSIS TYPE: ICD-10-CM

## 2019-10-31 PROCEDURE — 77080 DXA BONE DENSITY AXIAL: CPT

## 2019-11-15 ENCOUNTER — OFFICE VISIT (OUTPATIENT)
Dept: PAIN MEDICINE | Facility: CLINIC | Age: 64
End: 2019-11-15

## 2019-11-15 VITALS
HEART RATE: 90 BPM | DIASTOLIC BLOOD PRESSURE: 71 MMHG | WEIGHT: 200 LBS | SYSTOLIC BLOOD PRESSURE: 115 MMHG | OXYGEN SATURATION: 95 % | TEMPERATURE: 98.2 F | HEIGHT: 62 IN | BODY MASS INDEX: 36.8 KG/M2 | RESPIRATION RATE: 16 BRPM

## 2019-11-15 DIAGNOSIS — M47.817 LUMBOSACRAL SPONDYLOSIS WITHOUT MYELOPATHY: ICD-10-CM

## 2019-11-15 DIAGNOSIS — M54.16 LUMBAR RADICULITIS: ICD-10-CM

## 2019-11-15 DIAGNOSIS — G89.4 CHRONIC PAIN SYNDROME: Primary | ICD-10-CM

## 2019-11-15 DIAGNOSIS — M25.50 POLYARTHRALGIA: ICD-10-CM

## 2019-11-15 DIAGNOSIS — Z79.899 HIGH RISK MEDICATION USE: ICD-10-CM

## 2019-11-15 DIAGNOSIS — M46.1 SACROILIITIS (HCC): ICD-10-CM

## 2019-11-15 PROCEDURE — G0463 HOSPITAL OUTPT CLINIC VISIT: HCPCS | Performed by: ANESTHESIOLOGY

## 2019-11-15 PROCEDURE — 99214 OFFICE O/P EST MOD 30 MIN: CPT | Performed by: ANESTHESIOLOGY

## 2019-11-15 RX ORDER — HYDROCODONE BITARTRATE AND ACETAMINOPHEN 7.5; 325 MG/1; MG/1
1 TABLET ORAL 3 TIMES DAILY PRN
Qty: 90 TABLET | Refills: 0 | Status: SHIPPED | OUTPATIENT
Start: 2019-11-15 | End: 2020-01-17 | Stop reason: SDUPTHER

## 2019-11-15 RX ORDER — GABAPENTIN 800 MG/1
800 TABLET ORAL 3 TIMES DAILY
Qty: 90 TABLET | Refills: 11 | Status: SHIPPED | OUTPATIENT
Start: 2019-11-15 | End: 2020-11-09 | Stop reason: SDUPTHER

## 2019-11-15 RX ORDER — HYDROCODONE BITARTRATE AND ACETAMINOPHEN 7.5; 325 MG/1; MG/1
1 TABLET ORAL 3 TIMES DAILY PRN
Qty: 90 TABLET | Refills: 0 | Status: SHIPPED | OUTPATIENT
Start: 2019-11-15 | End: 2019-11-15 | Stop reason: SDUPTHER

## 2019-11-15 NOTE — PROGRESS NOTES
CC Lower back pain    64-year-old female with chronic back pain here for follow-up.    Continued worsening right hip/right gluteal pain denies weakness or injury. Considering right Si injection   Chronic back pain radiating to bilateral lower extremity worse on the left with aching numbness in both legs worse with standing walking or activity.  Denies new weakness saddle anesthesia bladder bowel incontinence.    Pain interfering with daily activity and sleep.  Marginal relief with physical therapy or anti-inflammatories.      Utilizes hydrocodone with good relief functional benefit and side effects.    Marginal relief with caudal JESSICA in the past    L-spine MRI 2016: Multilevel degenerative disc disease and degenerative facet change as well as  multilevel postoperative changes detailed above.  There is a 14 mm of anterolisthesis of L4 on L5 which has worsened from the prior exam where this was only 4 mm.  This results in severe bilateral neural foraminal narrowing but no spinal canal stenosis.    Pain Assessment   Cause of Pain: surgery  Location of Pain: Lower Back, R Hip, L Hip, R Leg, L Leg  Description of Pain: Dull/Aching, Throbbing, Stabbing  Previous Pain Rating : 7  Current Pain Ratin  Aggravating Factors: Activity  Alleviating Factors: Rest, Medication  Previous Treatments: Epidural Steroids, Narcotic Pain Medication, Physical Therapy  Previous Diagnostic Studies: MRI   PEG Assessment   What number best describes your pain on average in the past week?7  What number best describes how, during the past week, pain has interfered with your enjoyment of life?7  What number best describes how, during the past week, pain has interfered with your general activity? 8     has a past medical history of Arthritis, Depression, and Osteoporosis.     has a past surgical history that includes Cervical Curettage; Anterior cervical discectomy; Total vaginal hysterectomy; Appendectomy; Back surgery (2017); and  Mediastinotomy for exploration / drainage / biopsy / removal Fb w/ cervical approach (09/2011).     Social History     Tobacco Use   • Smoking status: Former Smoker     Types: Cigarettes   • Smokeless tobacco: Never Used   Substance Use Topics   • Alcohol use: No     Frequency: Never     Review of Systems        See HPI    General       Denies fever/chills, fatigue and sleep problems.    Eyes       Denies blurry vision and double vision.    ENT       Denies decreased hearing, sore throat and ears ringing.    CV       Denies chest pain and fainting.    Resp       Denies shortness of breath and cough.    GI       Denies heartburn, constipation, nausea, vomiting and diarrhea.           Denies pain on urination, incontinence and increased frequency.    MS       low back pain bilateral upper extremity    Derm       Denies rash and itching.    Neuro       Denies numbness, tingling, loss of balance and history of seizures.    Psych       Denies anxiety and depression.    Endo       Denies weight change and thirsty all the time.    Heme       Denies easy bruising, bleeding and enlarged lymph nodes.  Vitals:    11/15/19 1033   BP: 115/71   Pulse: 90   Resp: 16   Temp: 98.2 °F (36.8 °C)   SpO2: 95%     Physical Exam   General  General appearance:  no acute distress  Gait and station: antalgic  Mental Status Exam   Mental Status: AAO x3  Behavior: Appropriate    Cervical   ROM decreased w/ lateral rotation  Spurling's Test Negative  Palpation: Tender  Paracervical    Thoracolumbar   ROM: decreased rotation/extension  Straight Leg Raise: positive, left  Palpation: Tender  Facets, Paravertebral  Neuro   Reflexes: R Arm 2+  L arm 2+  R Leg 2+  L Leg 3+    Strength: Arms Normal  Strength: Legs Normal   strength intact and symmetrical bilateral upper and lower extremity 5/5      Eyes:      Clear conjunctivae,      Pupils rounds and reactive  ENT:      Clear oropharynx,       Mucosa moist/pink       Nose: no deformity  Chest  Wall:      Non tender      No deformities or masses noted.    Respiratory:      Clear bilaterally to auscultation.        No dyspnea  Heart:      Regular rate and rhythm, no murmurs, rubs, or gallops   Pulses:      Pulses 2+, symmetric  Extremities:      No clubbing, cyanosis, edema, or deformity noted   Neurologic:      No focal deficits      Coordination intact with rapid alternating movement.  Skin:      Intact without lesions or rashes.  No mass  Cervical Nodes:      No adenopathy noted.      Global pain scale on chart                     opioid risk tool low risk      Assessment /Plan     Sherrie was seen today for back pain and follow-up.    Diagnoses and all orders for this visit:    Chronic pain syndrome    Lumbosacral spondylosis without myelopathy    Sacroiliitis (CMS/HCC)    Lumbar radiculitis    Polyarthralgia    High risk medication use    Other orders  -     Discontinue: HYDROcodone-acetaminophen (NORCO) 7.5-325 MG per tablet; Take 1 tablet by mouth 3 (Three) Times a Day As Needed for Moderate Pain .  -     gabapentin (NEURONTIN) 800 MG tablet; Take 1 tablet by mouth 3 (Three) Times a Day.  -     HYDROcodone-acetaminophen (NORCO) 7.5-325 MG per tablet; Take 1 tablet by mouth 3 (Three) Times a Day As Needed for Moderate Pain .      Summary   64-year-old female with chronic back pain here for follow-up.    chronic pain from lumbar DDD spondylosis with radicular pain.  Chronic polyarthralgia.    Functional benefit hydrocodone.  Declines repeat LESI/right Si injection at this time.      Continue hydrocodone 7.5/25 3 times daily.  UDS and inspect reviewed.  Discussed risk of tolerance, dependence, respiratory depression, coma and death associated with use of oral opioids for treatment of chronic nonmalignant pain.      Continue gabapentin/flexeril.      RTC 2 mo

## 2020-01-16 RX ORDER — ATORVASTATIN CALCIUM 40 MG/1
40 TABLET, FILM COATED ORAL NIGHTLY
Qty: 90 TABLET | Refills: 3 | Status: SHIPPED | OUTPATIENT
Start: 2020-01-16 | End: 2020-09-08

## 2020-01-17 ENCOUNTER — OFFICE VISIT (OUTPATIENT)
Dept: PAIN MEDICINE | Facility: CLINIC | Age: 65
End: 2020-01-17

## 2020-01-17 ENCOUNTER — APPOINTMENT (OUTPATIENT)
Dept: PAIN MEDICINE | Facility: CLINIC | Age: 65
End: 2020-01-17

## 2020-01-17 ENCOUNTER — RESULTS ENCOUNTER (OUTPATIENT)
Dept: PAIN MEDICINE | Facility: HOSPITAL | Age: 65
End: 2020-01-17

## 2020-01-17 VITALS
TEMPERATURE: 97.6 F | RESPIRATION RATE: 16 BRPM | SYSTOLIC BLOOD PRESSURE: 114 MMHG | HEIGHT: 63 IN | OXYGEN SATURATION: 95 % | DIASTOLIC BLOOD PRESSURE: 70 MMHG | BODY MASS INDEX: 35.08 KG/M2 | HEART RATE: 78 BPM | WEIGHT: 198 LBS

## 2020-01-17 DIAGNOSIS — M25.50 POLYARTHRALGIA: ICD-10-CM

## 2020-01-17 DIAGNOSIS — F19.90 CURRENT DRUG USE: Primary | ICD-10-CM

## 2020-01-17 DIAGNOSIS — F19.90 CURRENT DRUG USE: ICD-10-CM

## 2020-01-17 DIAGNOSIS — G89.4 CHRONIC PAIN SYNDROME: Primary | ICD-10-CM

## 2020-01-17 DIAGNOSIS — M47.817 LUMBOSACRAL SPONDYLOSIS WITHOUT MYELOPATHY: ICD-10-CM

## 2020-01-17 DIAGNOSIS — M54.16 LUMBAR RADICULITIS: ICD-10-CM

## 2020-01-17 DIAGNOSIS — Z79.899 HIGH RISK MEDICATION USE: ICD-10-CM

## 2020-01-17 PROCEDURE — G0463 HOSPITAL OUTPT CLINIC VISIT: HCPCS | Performed by: ANESTHESIOLOGY

## 2020-01-17 PROCEDURE — 99214 OFFICE O/P EST MOD 30 MIN: CPT | Performed by: ANESTHESIOLOGY

## 2020-01-17 RX ORDER — HYDROCODONE BITARTRATE AND ACETAMINOPHEN 7.5; 325 MG/1; MG/1
1 TABLET ORAL 3 TIMES DAILY PRN
Qty: 90 TABLET | Refills: 0 | Status: SHIPPED | OUTPATIENT
Start: 2020-01-17 | End: 2020-03-12 | Stop reason: SDUPTHER

## 2020-01-17 RX ORDER — HYDROCODONE BITARTRATE AND ACETAMINOPHEN 7.5; 325 MG/1; MG/1
1 TABLET ORAL 3 TIMES DAILY PRN
Qty: 90 TABLET | Refills: 0 | Status: SHIPPED | OUTPATIENT
Start: 2020-01-17 | End: 2020-01-17 | Stop reason: SDUPTHER

## 2020-01-17 NOTE — PROGRESS NOTES
CC right hip/buttock, right leg, lower back pain  64-year-old female with chronic back pain here for follow-up.    Complains of right hip/right gluteal pain denies weakness or injury.    Chronic back pain radiating to bilateral lower extremity worse on the left with aching numbness in both legs worse with standing walking or activity.  Denies new weakness saddle anesthesia bladder bowel incontinence.    Pain interfering with daily activity and sleep.  Marginal relief with physical therapy or anti-inflammatories.      Utilizes hydrocodone with good relief functional benefit and side effects.    Marginal relief with caudal JESSICA in the past    L-spine MRI 2016: Multilevel degenerative disc disease and degenerative facet change as well as  multilevel postoperative changes detailed above.  There is a 14 mm of anterolisthesis of L4 on L5 which has worsened from the prior exam where this was only 4 mm.  This results in severe bilateral neural foraminal narrowing but no spinal canal stenosis.    Pain Assessment   Cause of Pain: surgery  Location of Pain: Lower Back, R Hip, L Hip, R Leg, L Leg  Description of Pain: Dull/Aching, Throbbing, Stabbing  Previous Pain Rating : 7  Current Pain Ratin  Aggravating Factors: Activity  Alleviating Factors: Rest, Medication  Previous Treatments: Epidural Steroids, Narcotic Pain Medication, Physical Therapy  Previous Diagnostic Studies: MRI   PEG Assessment   What number best describes your pain on average in the past week?7  What number best describes how, during the past week, pain has interfered with your enjoyment of life?7  What number best describes how, during the past week, pain has interfered with your general activity? 8     has a past medical history of Arthritis, Depression, Low back pain, and Osteoporosis.     has a past surgical history that includes Cervical Curettage; Anterior cervical discectomy; Total vaginal hysterectomy; Appendectomy; Back surgery (2017); and  Mediastinotomy for exploration / drainage / biopsy / removal Fb w/ cervical approach (09/2011).     Social History     Tobacco Use   • Smoking status: Former Smoker     Types: Cigarettes   • Smokeless tobacco: Never Used   Substance Use Topics   • Alcohol use: No     Frequency: Never     Review of Systems        See HPI    General       Denies fever/chills, fatigue and sleep problems.    Eyes       Denies blurry vision and double vision.    ENT       Denies decreased hearing, sore throat and ears ringing.    CV       Denies chest pain and fainting.    Resp       Denies shortness of breath and cough.    GI       Denies heartburn, constipation, nausea, vomiting and diarrhea.           Denies pain on urination, incontinence and increased frequency.    MS       low back pain bilateral upper extremity    Derm       Denies rash and itching.    Neuro       Denies numbness, tingling, loss of balance and history of seizures.    Psych       Denies anxiety and depression.    Endo       Denies weight change and thirsty all the time.    Heme       Denies easy bruising, bleeding and enlarged lymph nodes.  Vitals:    01/17/20 1015   BP: 114/70   Pulse: 78   Resp: 16   Temp: 97.6 °F (36.4 °C)   SpO2: 95%     Physical Exam   General  General appearance:  no acute distress  Gait and station: antalgic  Mental Status Exam   Mental Status: AAO x3  Behavior: Appropriate    Cervical   ROM decreased w/ lateral rotation  Spurling's Test Negative  Palpation: Tender  Paracervical    Thoracolumbar   ROM: decreased rotation/extension  Straight Leg Raise: positive, left  Palpation: Tender  Facets, Paravertebral  Neuro   Reflexes: R Arm 2+  L arm 2+  R Leg 2+  L Leg 3+    Strength: Arms Normal  Strength: Legs Normal   strength intact and symmetrical bilateral upper and lower extremity 5/5      Eyes:      Clear conjunctivae,      Pupils rounds and reactive  ENT:      Clear oropharynx,       Mucosa moist/pink       Nose: no deformity  Chest  Wall:      Non tender      No deformities or masses noted.    Respiratory:      Clear bilaterally to auscultation.        No dyspnea  Heart:      Regular rate and rhythm, no murmurs, rubs, or gallops   Pulses:      Pulses 2+, symmetric  Extremities:      No clubbing, cyanosis, edema, or deformity noted   Neurologic:      No focal deficits      Coordination intact with rapid alternating movement.  Skin:      Intact without lesions or rashes.  No mass  Cervical Nodes:      No adenopathy noted.      PHQ 9 on chart                     opioid risk tool low risk      Assessment /Plan     Sherrie was seen today for back pain and follow-up.    Diagnoses and all orders for this visit:    Chronic pain syndrome  -     Discontinue: HYDROcodone-acetaminophen (NORCO) 7.5-325 MG per tablet; Take 1 tablet by mouth 3 (Three) Times a Day As Needed for Moderate Pain .  -     HYDROcodone-acetaminophen (NORCO) 7.5-325 MG per tablet; Take 1 tablet by mouth 3 (Three) Times a Day As Needed for Moderate Pain .    Lumbosacral spondylosis without myelopathy  -     Discontinue: HYDROcodone-acetaminophen (NORCO) 7.5-325 MG per tablet; Take 1 tablet by mouth 3 (Three) Times a Day As Needed for Moderate Pain .  -     HYDROcodone-acetaminophen (NORCO) 7.5-325 MG per tablet; Take 1 tablet by mouth 3 (Three) Times a Day As Needed for Moderate Pain .    Polyarthralgia  -     Discontinue: HYDROcodone-acetaminophen (NORCO) 7.5-325 MG per tablet; Take 1 tablet by mouth 3 (Three) Times a Day As Needed for Moderate Pain .  -     HYDROcodone-acetaminophen (NORCO) 7.5-325 MG per tablet; Take 1 tablet by mouth 3 (Three) Times a Day As Needed for Moderate Pain .    Lumbar radiculitis    High risk medication use      Summary   64-year-old female with chronic back pain here for follow-up.    chronic pain from lumbar DDD spondylosis with radicular pain.  Chronic polyarthralgia.    Continue right buttock/SI pain.  Continue right lower extremity radicular pain.   Nelli VELASCO.  Will schedule right SI injection..      Continue hydrocodone 7.5/25 3 times daily.  UDS and inspect reviewed.  Discussed risk of tolerance, dependence, respiratory depression, coma and death associated with use of oral opioids for treatment of chronic nonmalignant pain.      Continue gabapentin/flexeril.  Start topical compounded neuropathic/anti-inflammatory cream with ketamine.      RTC for procedure or 2 mo

## 2020-01-30 ENCOUNTER — HOSPITAL ENCOUNTER (OUTPATIENT)
Dept: PAIN MEDICINE | Facility: HOSPITAL | Age: 65
Discharge: HOME | End: 2020-01-30
Admitting: ANESTHESIOLOGY

## 2020-01-30 ENCOUNTER — HOSPITAL ENCOUNTER (OUTPATIENT)
Dept: PAIN MEDICINE | Facility: HOSPITAL | Age: 65
Discharge: HOME | End: 2020-01-30

## 2020-01-30 VITALS
TEMPERATURE: 97.6 F | WEIGHT: 199 LBS | OXYGEN SATURATION: 94 % | RESPIRATION RATE: 16 BRPM | BODY MASS INDEX: 33.97 KG/M2 | HEIGHT: 64 IN | HEART RATE: 90 BPM | DIASTOLIC BLOOD PRESSURE: 69 MMHG | SYSTOLIC BLOOD PRESSURE: 108 MMHG

## 2020-01-30 DIAGNOSIS — M46.1 SACROILIITIS (HCC): Primary | ICD-10-CM

## 2020-01-30 DIAGNOSIS — R52 PAIN: ICD-10-CM

## 2020-01-30 PROCEDURE — 27096 INJECT SACROILIAC JOINT: CPT | Performed by: ANESTHESIOLOGY

## 2020-01-30 PROCEDURE — 77003 FLUOROGUIDE FOR SPINE INJECT: CPT

## 2020-01-30 PROCEDURE — 25010000002 METHYLPREDNISOLONE PER 40 MG

## 2020-01-30 RX ORDER — BUPIVACAINE HYDROCHLORIDE 2.5 MG/ML
INJECTION, SOLUTION EPIDURAL; INFILTRATION; INTRACAUDAL
Status: DISCONTINUED
Start: 2020-01-30 | End: 2020-01-31 | Stop reason: HOSPADM

## 2020-01-30 RX ORDER — METHYLPREDNISOLONE ACETATE 40 MG/ML
INJECTION, SUSPENSION INTRA-ARTICULAR; INTRALESIONAL; INTRAMUSCULAR; SOFT TISSUE
Status: DISCONTINUED
Start: 2020-01-30 | End: 2020-01-31 | Stop reason: HOSPADM

## 2020-01-30 NOTE — PATIENT INSTRUCTIONS
Sacroiliac Joint Injection, Care After  This sheet gives you information about how to care for yourself after your procedure. Your health care provider may also give you more specific instructions. If you have problems or questions, contact your health care provider.  What can I expect after the procedure?  After the procedure, it is common to have bruising or soreness at the injection site.  Follow these instructions at home:  Managing pain and swelling         · Keep a record of your pain (pain log) to share with your health care provider at your follow-up visit. If a long-acting anti-inflammatory medicine (steroid) was included in your injection, you may not notice an improvement in your pain level for a few days.  · Take over-the-counter and prescription medicines only as told by your health care provider.  · Do not use a heating pad and do not apply heat directly to the area after the procedure.  · Ask your health care provider about using cold therapy.  · If directed, put ice on the affected area.  ? Put ice in a plastic bag.  ? Place a towel between your skin and the bag.  ? Leave the ice on for 20 minutes, 2-3 times a day. Do not use cold therapy for more than 24 hours.  · Check your injection site every day for signs of infection. Check for:  ? Redness, swelling, or pain.  ? Fluid or blood.  ? Warmth.  ? Pus or a bad smell.  Activity  · Rest the day of your injection. Avoid doing a lot of activity on the day of the procedure.  · Avoid any activities that take a lot of effort for 24 hours after the injection.  · Return to your normal activities as told by your health care provider. Ask your health care provider what activities are safe for you.  · Do physical therapy exercises as told by your health care provider or physical therapist. These exercises are used to strengthen muscles surrounding the SI joint, and that will help to relieve pain.  General instructions  · If dye was used during your procedure,  drink plenty of water to flush the dye out of your body.  · Do not take baths, swim, or use a hot tub until your health care provider approves. You may take showers.  · Do not drive for 24 hours if you were given a medicine to help you relax (sedative) during your procedure.  · Do not use any products that contain nicotine or tobacco, such as cigarettes and e-cigarettes. If you need help quitting, ask your health care provider.  · Keep all follow-up visits as told by your health care provider. This is important.  Contact a health care provider if:  · Your pain does not improve or it gets worse.  · You have numbness, tingling, or weakness.  · You have a fever.  · You have redness, swelling, or pain around your injection site.  · You have fluid or blood coming from your injection site.  · Your injection site feels warm to the touch.  · You have pus or a bad smell coming from your injection site.  Get help right away if:  · You have chest pain or shortness of breath.  Summary  · After the procedure, it is common to have bruising or soreness at the injection site.  · Keep a record of your pain (pain log) to share with your health care provider at your follow-up visit.  · Return to your normal activities as told by your health care provider. Ask your health care provider what activities are safe for you.  · Contact your health care provider if you have pain that is getting worse, weakness, numbness, or any sign of infection at your injection site.  This information is not intended to replace advice given to you by your health care provider. Make sure you discuss any questions you have with your health care provider.  Document Released: 09/24/2018 Document Revised: 09/24/2018 Document Reviewed: 09/24/2018  [employer] Patient Education © 2019 [employer]

## 2020-03-12 ENCOUNTER — OFFICE VISIT (OUTPATIENT)
Dept: PAIN MEDICINE | Facility: CLINIC | Age: 65
End: 2020-03-12

## 2020-03-12 VITALS
SYSTOLIC BLOOD PRESSURE: 93 MMHG | TEMPERATURE: 98.2 F | WEIGHT: 201 LBS | DIASTOLIC BLOOD PRESSURE: 52 MMHG | BODY MASS INDEX: 35.61 KG/M2 | HEIGHT: 63 IN | OXYGEN SATURATION: 96 % | RESPIRATION RATE: 16 BRPM | HEART RATE: 90 BPM

## 2020-03-12 DIAGNOSIS — Z79.899 HIGH RISK MEDICATION USE: ICD-10-CM

## 2020-03-12 DIAGNOSIS — M25.50 POLYARTHRALGIA: ICD-10-CM

## 2020-03-12 DIAGNOSIS — G89.4 CHRONIC PAIN SYNDROME: Primary | ICD-10-CM

## 2020-03-12 DIAGNOSIS — M47.817 LUMBOSACRAL SPONDYLOSIS WITHOUT MYELOPATHY: ICD-10-CM

## 2020-03-12 DIAGNOSIS — M46.1 SACROILIITIS (HCC): ICD-10-CM

## 2020-03-12 DIAGNOSIS — M54.16 LUMBAR RADICULITIS: ICD-10-CM

## 2020-03-12 PROCEDURE — G0463 HOSPITAL OUTPT CLINIC VISIT: HCPCS | Performed by: ANESTHESIOLOGY

## 2020-03-12 PROCEDURE — 99214 OFFICE O/P EST MOD 30 MIN: CPT | Performed by: ANESTHESIOLOGY

## 2020-03-12 RX ORDER — HYDROCODONE BITARTRATE AND ACETAMINOPHEN 7.5; 325 MG/1; MG/1
1 TABLET ORAL 3 TIMES DAILY PRN
Qty: 90 TABLET | Refills: 0 | Status: SHIPPED | OUTPATIENT
Start: 2020-03-12 | End: 2020-05-14 | Stop reason: SDUPTHER

## 2020-03-12 RX ORDER — HYDROCODONE BITARTRATE AND ACETAMINOPHEN 7.5; 325 MG/1; MG/1
1 TABLET ORAL 3 TIMES DAILY PRN
Qty: 90 TABLET | Refills: 0 | Status: SHIPPED | OUTPATIENT
Start: 2020-03-12 | End: 2020-03-12 | Stop reason: SDUPTHER

## 2020-03-12 NOTE — PROGRESS NOTES
CC  hip/buttock, right leg, lower back pain  65-year-old female with chronic back pain here for follow-up.    Continued of right>left hip/right gluteal pain denies weakness or injury.  Good relief with right SI injection but did not last long.  Still declines LESI due to past experience with post dural puncture headache.   Chronic back pain radiating to bilateral lower extremity worse on the left with aching numbness in both legs worse with standing walking or activity.  Denies new weakness saddle anesthesia bladder bowel incontinence.    Pain interfering with daily activity and sleep.  Marginal relief with physical therapy or anti-inflammatories.      Utilizes hydrocodone with good relief functional benefit and side effects.    Marginal relief with caudal JESSICA in the past    L-spine MRI 2016: Multilevel degenerative disc disease and degenerative facet change as well as  multilevel postoperative changes detailed above.  There is a 14 mm of anterolisthesis of L4 on L5 which has worsened from the prior exam where this was only 4 mm.  This results in severe bilateral neural foraminal narrowing but no spinal canal stenosis.    Pain Assessment   Cause of Pain: surgery  Location of Pain: Lower Back, R Hip, L Hip, R Leg, L Leg  Description of Pain: Dull/Aching, Throbbing, Stabbing  Previous Pain Rating : 7  Current Pain Ratin  Aggravating Factors: Activity  Alleviating Factors: Rest, Medication  Previous Treatments: Epidural Steroids, Narcotic Pain Medication, Physical Therapy  Previous Diagnostic Studies: MRI   PEG Assessment   What number best describes your pain on average in the past week?8  What number best describes how, during the past week, pain has interfered with your enjoyment of life?9  What number best describes how, during the past week, pain has interfered with your general activity? 9     has a past medical history of Arthritis, Depression, Low back pain, and Osteoporosis.     has a past surgical history  that includes Cervical Curettage; Anterior cervical discectomy; Total vaginal hysterectomy; Appendectomy; Back surgery (2017); and Mediastinotomy for exploration / drainage / biopsy / removal Fb w/ cervical approach (09/2011).     Social History     Tobacco Use   • Smoking status: Former Smoker     Types: Cigarettes   • Smokeless tobacco: Never Used   Substance Use Topics   • Alcohol use: No     Frequency: Never     Review of Systems        See HPI    General       Denies fever/chills, fatigue and sleep problems.    Eyes       Denies blurry vision and double vision.    ENT       Denies decreased hearing, sore throat and ears ringing.    CV       Denies chest pain and fainting.    Resp       Denies shortness of breath and cough.    GI       Denies heartburn, constipation, nausea, vomiting and diarrhea.           Denies pain on urination, incontinence and increased frequency.    MS       low back pain bilateral upper extremity    Derm       Denies rash and itching.    Neuro       Denies numbness, tingling, loss of balance and history of seizures.    Psych       Denies anxiety and depression.    Endo       Denies weight change and thirsty all the time.    Heme       Denies easy bruising, bleeding and enlarged lymph nodes.  Vitals:    03/12/20 1123   BP: 93/52   Pulse: 90   Resp: 16   Temp: 98.2 °F (36.8 °C)   SpO2: 96%     Physical Exam   General  General appearance:  no acute distress  Gait and station: antalgic  Mental Status Exam   Mental Status: AAO x3  Behavior: Appropriate    Cervical   ROM decreased w/ lateral rotation  Spurling's Test Negative  Palpation: Tender  Paracervical    Thoracolumbar   ROM: decreased rotation/extension  Straight Leg Raise: positive, left  Palpation: Tender  Facets, Paravertebral  Neuro   Reflexes: R Arm 2+  L arm 2+  R Leg 2+  L Leg 3+    Strength: Arms Normal  Strength: Legs Normal   strength intact and symmetrical bilateral upper and lower extremity 5/5      Eyes:      Clear  conjunctivae,      Pupils rounds and reactive  ENT:      Clear oropharynx,       Mucosa moist/pink       Nose: no deformity  Chest Wall:      Non tender      No deformities or masses noted.    Respiratory:      Clear bilaterally to auscultation.        No dyspnea  Heart:      Regular rate and rhythm, no murmurs, rubs, or gallops   Pulses:      Pulses 2+, symmetric  Extremities:      No clubbing, cyanosis, edema, or deformity noted   Neurologic:      No focal deficits      Coordination intact with rapid alternating movement.  Skin:      Intact without lesions or rashes.  No mass  Cervical Nodes:      No adenopathy noted.      PHQ 9 on chart                     opioid risk tool low risk      Assessment /Plan     Sherrie was seen today for back pain and follow-up.    Diagnoses and all orders for this visit:    Chronic pain syndrome  -     Discontinue: HYDROcodone-acetaminophen (NORCO) 7.5-325 MG per tablet; Take 1 tablet by mouth 3 (Three) Times a Day As Needed for Moderate Pain .  -     HYDROcodone-acetaminophen (NORCO) 7.5-325 MG per tablet; Take 1 tablet by mouth 3 (Three) Times a Day As Needed for Moderate Pain .    Lumbosacral spondylosis without myelopathy  -     Discontinue: HYDROcodone-acetaminophen (NORCO) 7.5-325 MG per tablet; Take 1 tablet by mouth 3 (Three) Times a Day As Needed for Moderate Pain .  -     HYDROcodone-acetaminophen (NORCO) 7.5-325 MG per tablet; Take 1 tablet by mouth 3 (Three) Times a Day As Needed for Moderate Pain .    Lumbar radiculitis    Polyarthralgia  -     Discontinue: HYDROcodone-acetaminophen (NORCO) 7.5-325 MG per tablet; Take 1 tablet by mouth 3 (Three) Times a Day As Needed for Moderate Pain .  -     HYDROcodone-acetaminophen (NORCO) 7.5-325 MG per tablet; Take 1 tablet by mouth 3 (Three) Times a Day As Needed for Moderate Pain .    High risk medication use    Sacroiliitis (CMS/HCC)  -     Ambulatory Referral to Pain Management      Summary   65-year-old female with chronic  back pain here for follow-up.    chronic pain from lumbar DDD spondylosis with radicular pain.  Chronic polyarthralgia.    Good relief of right SI injection last visit but did not last long.  Worsening bilateral SI pain.  Schedule for repeat bilateral SI injections.    Continue hydrocodone 7.5/25 3 times daily.  UDS and inspect reviewed.  Discussed risk of tolerance, dependence, respiratory depression, coma and death associated with use of oral opioids for treatment of chronic nonmalignant pain.      Continue gabapentin/flexeril.  Start topical compounded neuropathic/anti-inflammatory cream with ketamine.      RTC for procedure or 2 mo

## 2020-03-24 ENCOUNTER — APPOINTMENT (OUTPATIENT)
Dept: PAIN MEDICINE | Facility: HOSPITAL | Age: 65
End: 2020-03-24

## 2020-04-15 RX ORDER — CYCLOBENZAPRINE HCL 10 MG
10 TABLET ORAL 3 TIMES DAILY PRN
Qty: 90 TABLET | Refills: 11 | Status: SHIPPED | OUTPATIENT
Start: 2020-04-15 | End: 2021-04-12

## 2020-04-15 RX ORDER — CYCLOBENZAPRINE HCL 10 MG
TABLET ORAL
Qty: 30 TABLET | Refills: 2 | OUTPATIENT
Start: 2020-04-15

## 2020-05-14 ENCOUNTER — OFFICE VISIT (OUTPATIENT)
Dept: PAIN MEDICINE | Facility: CLINIC | Age: 65
End: 2020-05-14

## 2020-05-14 VITALS — HEIGHT: 62 IN | BODY MASS INDEX: 36.8 KG/M2 | WEIGHT: 200 LBS

## 2020-05-14 DIAGNOSIS — M25.50 POLYARTHRALGIA: ICD-10-CM

## 2020-05-14 DIAGNOSIS — M47.817 LUMBOSACRAL SPONDYLOSIS WITHOUT MYELOPATHY: ICD-10-CM

## 2020-05-14 DIAGNOSIS — M46.1 SACROILIITIS (HCC): ICD-10-CM

## 2020-05-14 DIAGNOSIS — Z79.899 HIGH RISK MEDICATION USE: ICD-10-CM

## 2020-05-14 DIAGNOSIS — G89.4 CHRONIC PAIN SYNDROME: Primary | ICD-10-CM

## 2020-05-14 PROCEDURE — 99443 PR PHYS/QHP TELEPHONE EVALUATION 21-30 MIN: CPT | Performed by: ANESTHESIOLOGY

## 2020-05-14 RX ORDER — HYDROCODONE BITARTRATE AND ACETAMINOPHEN 7.5; 325 MG/1; MG/1
1 TABLET ORAL 3 TIMES DAILY PRN
Qty: 90 TABLET | Refills: 0 | Status: SHIPPED | OUTPATIENT
Start: 2020-06-14 | End: 2020-07-14 | Stop reason: SDUPTHER

## 2020-05-14 RX ORDER — HYDROCODONE BITARTRATE AND ACETAMINOPHEN 7.5; 325 MG/1; MG/1
1 TABLET ORAL 3 TIMES DAILY PRN
Qty: 90 TABLET | Refills: 0 | Status: SHIPPED | OUTPATIENT
Start: 2020-05-14 | End: 2020-06-23 | Stop reason: SDUPTHER

## 2020-05-14 NOTE — PROGRESS NOTES
CC  hip/buttock, right leg, lower back pain  65-year-old female with chronic back pain here for follow-up by telephone.    Yony SI injection was postponed due to COVID, continued symptoms.   Continued of right>left hip/right gluteal pain denies weakness or injury.  Good relief with right SI injection but did not last long.    Chronic back pain radiating to bilateral lower extremity worse on the left with aching numbness in both legs worse with standing walking or activity.  Denies new weakness saddle anesthesia bladder bowel incontinence.    Pain interfering with daily activity and sleep.  Marginal relief with physical therapy or anti-inflammatories.      Utilizes hydrocodone with good relief functional benefit and side effects.    Marginal relief with caudal JESSICA in the past.  Declines LESI due to past experience with post dural puncture headache.    L-spine MRI 2016: Multilevel degenerative disc disease and degenerative facet change as well as  multilevel postoperative changes detailed above.  There is a 14 mm of anterolisthesis of L4 on L5 which has worsened from the prior exam where this was only 4 mm.  This results in severe bilateral neural foraminal narrowing but no spinal canal stenosis.    Pain Assessment   Cause of Pain: surgery  Location of Pain: Lower Back, R Hip, L Hip, R Leg, L Leg  Description of Pain: Dull/Aching, Throbbing, Stabbing  Previous Pain Rating : 7  Current Pain Ratin  Aggravating Factors: Activity  Alleviating Factors: Rest, Medication  Previous Treatments: Epidural Steroids, Narcotic Pain Medication, Physical Therapy  Previous Diagnostic Studies: MRI   PEG Assessment   What number best describes your pain on average in the past week?8  What number best describes how, during the past week, pain has interfered with your enjoyment of life?8  What number best describes how, during the past week, pain has interfered with your general activity? 8     has a past medical history of Arthritis,  Depression, Low back pain, and Osteoporosis.     has a past surgical history that includes Cervical Curettage; Anterior cervical discectomy; Total vaginal hysterectomy; Appendectomy; Back surgery (2017); and Mediastinotomy for exploration / drainage / biopsy / removal Fb w/ cervical approach (09/2011).     Social History     Tobacco Use   • Smoking status: Former Smoker     Types: Cigarettes   • Smokeless tobacco: Never Used   Substance Use Topics   • Alcohol use: No     Frequency: Never     Review of Systems        See HPI    General       Denies fever/chills, fatigue and sleep problems.    Eyes       Denies blurry vision and double vision.    ENT       Denies decreased hearing, sore throat and ears ringing.    CV       Denies chest pain and fainting.    Resp       Denies shortness of breath and cough.    GI       Denies heartburn, constipation, nausea, vomiting and diarrhea.           Denies pain on urination, incontinence and increased frequency.    MS       low back pain bilateral upper extremity    Derm       Denies rash and itching.    Neuro       Denies numbness, tingling, loss of balance and history of seizures.    Psych       Denies anxiety and depression.    Endo       Denies weight change and thirsty all the time.    Heme       Denies easy bruising, bleeding and enlarged lymph nodes.  There were no vitals filed for this visit.  Physical Exam   General  General appearance:  no acute distress  Gait and station: antalgic  Mental Status Exam   Mental Status: AAO x3  Behavior: Appropriate    Cervical   ROM decreased w/ lateral rotation  Spurling's Test Negative  Palpation: Tender  Paracervical    Thoracolumbar   ROM: decreased rotation/extension  Straight Leg Raise: positive, left  Palpation: Tender  Facets, Paravertebral  Neuro   Reflexes: R Arm 2+  L arm 2+  R Leg 2+  L Leg 3+    Strength: Arms Normal  Strength: Legs Normal   strength intact and symmetrical bilateral upper and lower extremity 5/5       Eyes:      Clear conjunctivae,      Pupils rounds and reactive  ENT:      Clear oropharynx,       Mucosa moist/pink       Nose: no deformity  Chest Wall:      Non tender      No deformities or masses noted.    Respiratory:      Clear bilaterally to auscultation.        No dyspnea  Heart:      Regular rate and rhythm, no murmurs, rubs, or gallops   Pulses:      Pulses 2+, symmetric  Extremities:      No clubbing, cyanosis, edema, or deformity noted   Neurologic:      No focal deficits      Coordination intact with rapid alternating movement.  Skin:      Intact without lesions or rashes.  No mass  Cervical Nodes:      No adenopathy noted.      PHQ 9 on chart                     opioid risk tool low risk      Assessment /Plan     Sherrie was seen today for back pain and follow-up.    Diagnoses and all orders for this visit:    Chronic pain syndrome  -     HYDROcodone-acetaminophen (NORCO) 7.5-325 MG per tablet; Take 1 tablet by mouth 3 (Three) Times a Day As Needed for Severe Pain .  -     HYDROcodone-acetaminophen (NORCO) 7.5-325 MG per tablet; Take 1 tablet by mouth 3 (Three) Times a Day As Needed for Severe Pain . DNF before 6/14/2020    Lumbosacral spondylosis without myelopathy    Polyarthralgia    Sacroiliitis (CMS/HCC)  -     Ambulatory Referral to Pain Management    High risk medication use      Summary   65-year-old female with chronic back pain here for follow-up telephone.    chronic pain from lumbar DDD spondylosis with radicular pain.  Chronic polyarthralgia.     Continued worsening bilateral SI pain.  Schedule for repeat bilateral SI injections.    Continue hydrocodone 7.5/25 3 times daily.  UDS and inspect reviewed.  Discussed risk of tolerance, dependence, respiratory depression, coma and death associated with use of oral opioids for treatment of chronic nonmalignant pain.      Continue gabapentin/flexeril.  Cont. topical compounded neuropathic/anti-inflammatory cream with ketamine.    Telemedicine  done to avoid coronavirus exposure.  Discussed CDC guidelines and precaution regarding current epidemic.  Unable to complete visit using a video connection to the patient. A phone visit was used to complete visit. Total time of discussion was 21 minutes      RTC for procedure or 2 mo

## 2020-06-04 RX ORDER — DULOXETIN HYDROCHLORIDE 60 MG/1
CAPSULE, DELAYED RELEASE ORAL
Qty: 30 CAPSULE | Refills: 0 | Status: SHIPPED | OUTPATIENT
Start: 2020-06-04 | End: 2020-07-16 | Stop reason: SDUPTHER

## 2020-06-04 RX ORDER — LISINOPRIL AND HYDROCHLOROTHIAZIDE 25; 20 MG/1; MG/1
TABLET ORAL
Qty: 30 TABLET | Refills: 0 | Status: SHIPPED | OUTPATIENT
Start: 2020-06-04 | End: 2020-07-16 | Stop reason: SDUPTHER

## 2020-06-05 NOTE — TELEPHONE ENCOUNTER
Patient has an appt scheduled for June 16 going to see if we can change to a my chart visit or a visit with ralph

## 2020-06-15 ENCOUNTER — RESULTS ENCOUNTER (OUTPATIENT)
Dept: PAIN MEDICINE | Facility: HOSPITAL | Age: 65
End: 2020-06-15

## 2020-06-15 ENCOUNTER — HOSPITAL ENCOUNTER (OUTPATIENT)
Dept: PAIN MEDICINE | Facility: HOSPITAL | Age: 65
Discharge: HOME | End: 2020-06-15

## 2020-06-15 ENCOUNTER — HOSPITAL ENCOUNTER (OUTPATIENT)
Dept: PAIN MEDICINE | Facility: HOSPITAL | Age: 65
Discharge: HOME | End: 2020-06-15
Admitting: ANESTHESIOLOGY

## 2020-06-15 VITALS
BODY MASS INDEX: 35.44 KG/M2 | TEMPERATURE: 97.7 F | WEIGHT: 200 LBS | SYSTOLIC BLOOD PRESSURE: 113 MMHG | HEART RATE: 70 BPM | HEIGHT: 63 IN | OXYGEN SATURATION: 95 % | RESPIRATION RATE: 16 BRPM | DIASTOLIC BLOOD PRESSURE: 77 MMHG

## 2020-06-15 DIAGNOSIS — M53.3 SACROILIAC PAIN: ICD-10-CM

## 2020-06-15 DIAGNOSIS — F19.90 CURRENT DRUG USE: ICD-10-CM

## 2020-06-15 DIAGNOSIS — F19.90 CURRENT DRUG USE: Primary | ICD-10-CM

## 2020-06-15 DIAGNOSIS — M46.1 SACROILIITIS (HCC): Primary | ICD-10-CM

## 2020-06-15 PROBLEM — M48.00 SPINAL STENOSIS: Status: ACTIVE | Noted: 2017-05-09

## 2020-06-15 PROBLEM — M43.16 SPONDYLOLISTHESIS AT L4-L5 LEVEL: Status: ACTIVE | Noted: 2017-02-06

## 2020-06-15 PROBLEM — Z98.1 S/P LUMBAR FUSION: Status: ACTIVE | Noted: 2017-06-19

## 2020-06-15 PROBLEM — S32.009K PSEUDOARTHROSIS OF LUMBAR SPINE: Status: ACTIVE | Noted: 2017-02-06

## 2020-06-15 PROCEDURE — 77003 FLUOROGUIDE FOR SPINE INJECT: CPT

## 2020-06-15 PROCEDURE — 27096 INJECT SACROILIAC JOINT: CPT | Performed by: ANESTHESIOLOGY

## 2020-06-15 PROCEDURE — 0 IOPAMIDOL 41 % SOLUTION: Performed by: ANESTHESIOLOGY

## 2020-06-15 PROCEDURE — 25010000002 METHYLPREDNISOLONE PER 40 MG

## 2020-06-15 RX ORDER — METHYLPREDNISOLONE ACETATE 40 MG/ML
40 INJECTION, SUSPENSION INTRA-ARTICULAR; INTRALESIONAL; INTRAMUSCULAR; SOFT TISSUE ONCE
Status: COMPLETED | OUTPATIENT
Start: 2020-06-15 | End: 2020-06-15

## 2020-06-15 RX ORDER — BUPIVACAINE HYDROCHLORIDE 2.5 MG/ML
10 INJECTION, SOLUTION INFILTRATION; PERINEURAL ONCE
Status: COMPLETED | OUTPATIENT
Start: 2020-06-15 | End: 2020-06-15

## 2020-06-15 RX ORDER — METHYLPREDNISOLONE ACETATE 40 MG/ML
INJECTION, SUSPENSION INTRA-ARTICULAR; INTRALESIONAL; INTRAMUSCULAR; SOFT TISSUE
Status: DISPENSED
Start: 2020-06-15 | End: 2020-06-15

## 2020-06-15 RX ORDER — BUPIVACAINE HYDROCHLORIDE 2.5 MG/ML
INJECTION, SOLUTION EPIDURAL; INFILTRATION; INTRACAUDAL
Status: DISPENSED
Start: 2020-06-15 | End: 2020-06-15

## 2020-06-15 RX ADMIN — IOPAMIDOL 3 ML: 408 INJECTION, SOLUTION INTRATHECAL at 09:58

## 2020-06-15 RX ADMIN — BUPIVACAINE HYDROCHLORIDE 5 ML: 2.5 INJECTION, SOLUTION INFILTRATION; PERINEURAL at 09:59

## 2020-06-15 RX ADMIN — METHYLPREDNISOLONE ACETATE 40 MG: 40 INJECTION, SUSPENSION INTRA-ARTICULAR; INTRALESIONAL; INTRAMUSCULAR; SOFT TISSUE at 10:00

## 2020-06-15 RX ADMIN — BUPIVACAINE HYDROCHLORIDE 5 ML: 2.5 INJECTION, SOLUTION INFILTRATION; PERINEURAL at 09:58

## 2020-06-15 RX ADMIN — METHYLPREDNISOLONE ACETATE 40 MG: 40 INJECTION, SUSPENSION INTRA-ARTICULAR; INTRALESIONAL; INTRAMUSCULAR; SOFT TISSUE at 09:59

## 2020-06-15 NOTE — PATIENT INSTRUCTIONS
Sacroiliac Joint Injection, Care After  This sheet gives you information about how to care for yourself after your procedure. Your health care provider may also give you more specific instructions. If you have problems or questions, contact your health care provider.  What can I expect after the procedure?  After the procedure, it is common to have bruising or soreness at the injection site.  Follow these instructions at home:  Managing pain and swelling         · Keep a record of your pain (pain log) to share with your health care provider at your follow-up visit. If a long-acting anti-inflammatory medicine (steroid) was included in your injection, you may not notice an improvement in your pain level for a few days.  · Take over-the-counter and prescription medicines only as told by your health care provider.  · Do not use a heating pad and do not apply heat directly to the area after the procedure.  · Ask your health care provider about using cold therapy.  · If directed, put ice on the affected area.  ? Put ice in a plastic bag.  ? Place a towel between your skin and the bag.  ? Leave the ice on for 20 minutes, 2-3 times a day. Do not use cold therapy for more than 24 hours.  · Check your injection site every day for signs of infection. Check for:  ? Redness, swelling, or pain.  ? Fluid or blood.  ? Warmth.  ? Pus or a bad smell.  Activity  · Rest the day of your injection. Avoid doing a lot of activity on the day of the procedure.  · Avoid any activities that take a lot of effort for 24 hours after the injection.  · Return to your normal activities as told by your health care provider. Ask your health care provider what activities are safe for you.  · Do physical therapy exercises as told by your health care provider or physical therapist. These exercises are used to strengthen muscles surrounding the SI joint, and that will help to relieve pain.  General instructions  · If dye was used during your procedure,  drink plenty of water to flush the dye out of your body.  · Do not take baths, swim, or use a hot tub until your health care provider approves. You may take showers.  · Do not drive for 24 hours if you were given a medicine to help you relax (sedative) during your procedure.  · Do not use any products that contain nicotine or tobacco, such as cigarettes and e-cigarettes. If you need help quitting, ask your health care provider.  · Keep all follow-up visits as told by your health care provider. This is important.  Contact a health care provider if:  · Your pain does not improve or it gets worse.  · You have numbness, tingling, or weakness.  · You have a fever.  · You have redness, swelling, or pain around your injection site.  · You have fluid or blood coming from your injection site.  · Your injection site feels warm to the touch.  · You have pus or a bad smell coming from your injection site.  Get help right away if:  · You have chest pain or shortness of breath.  Summary  · After the procedure, it is common to have bruising or soreness at the injection site.  · Keep a record of your pain (pain log) to share with your health care provider at your follow-up visit.  · Return to your normal activities as told by your health care provider. Ask your health care provider what activities are safe for you.  · Contact your health care provider if you have pain that is getting worse, weakness, numbness, or any sign of infection at your injection site.  This information is not intended to replace advice given to you by your health care provider. Make sure you discuss any questions you have with your health care provider.  Document Released: 09/24/2018 Document Revised: 11/30/2018 Document Reviewed: 09/24/2018  [employer]vier Patient Education © 2020 [employer]

## 2020-06-15 NOTE — PROCEDURES
"Subjective    CC righthip/ buttock pain  Sherrie Chand is a 65 y.o. female with sacroiliitis here for bilateral Si injection.   No anticoagulation    Pain Assessment   Location of Pain: Lower Back, R Hip, Description of Pain: Dull/Aching, Throbbing, Stabbing  Previous Pain Rating :6  Current Pain Ratin  Aggravating Factors: Activity  Alleviating Factors: Rest, Medication    The following portions of the patient's history were reviewed and updated as appropriate: allergies, current medications, past family history, past medical history, past social history, past surgical history and problem list.    Review of Systems  As in HPI  Objective   Physical Exam   Constitutional: She is oriented to person, place, and time. She appears well-developed. No distress.   Cardiovascular: Normal rate.   Pulmonary/Chest: Effort normal.   Musculoskeletal:        Lumbar back: She exhibits decreased range of motion and tenderness.   Neurological: She is alert and oriented to person, place, and time.   Psychiatric: She has a normal mood and affect.     /77 (BP Location: Left arm, Patient Position: Lying)   Pulse 70   Temp 97.7 °F (36.5 °C)   Resp 16   Ht 160 cm (63\")   Wt 90.7 kg (200 lb)   LMP  (LMP Unknown)   SpO2 95%   BMI 35.43 kg/m²     Assessment/Plan    underwent Yony SI injection  RTC 4-6 weeks or as needed for repeat    DATE OF PROCEDURE:  6/15/2020    PREOPERATIVE DIAGNOSIS:  sacroiliitis    POSTOPERATIVE DIAGNOSIS: same    PROCEDURE PERFORMED: Bilateral SACROILIAC JOINT INJECTION    The patient understands the risks and benefits of the procedure and wishes to proceed. The patient was seen in the preoperative area. Patient's consent was obtained and updated. Vitals were taken. Patient was then brought to the procedure suite and placed in prone position for bilateral sacroiliac joint injection. The appropriate anatomic area was widely prepped with Chloraprep and draped in a sterile fashion. Noninvasive " monitoring per routine anesthesia protocol was placed. Under fluoroscopic guidance using an AP view, a 22 gauge curved tip spinal needle was passed through skin anesthetized with 1% Lidocaine without epinephrine. The needle tip was guided to the lower pole of the joint using fluoroscopy. 1 mL of  preservative free contrast was injected into the joint to confirm location. A clear outline was obtained and 5 mL of steroid solution containing 4 mL 0.25% bupivacaine, and 1mL 40mg Depomedrol was injected. The patient tolerated with no maurilio-procedural complications.  A sterile dressing was placed over the puncture sites.

## 2020-06-23 ENCOUNTER — OFFICE VISIT (OUTPATIENT)
Dept: FAMILY MEDICINE CLINIC | Facility: CLINIC | Age: 65
End: 2020-06-23

## 2020-06-23 VITALS
DIASTOLIC BLOOD PRESSURE: 70 MMHG | RESPIRATION RATE: 16 BRPM | OXYGEN SATURATION: 95 % | TEMPERATURE: 97.6 F | SYSTOLIC BLOOD PRESSURE: 103 MMHG | WEIGHT: 217.2 LBS | HEART RATE: 80 BPM | BODY MASS INDEX: 38.48 KG/M2 | HEIGHT: 63 IN

## 2020-06-23 DIAGNOSIS — G89.29 OTHER CHRONIC PAIN: ICD-10-CM

## 2020-06-23 DIAGNOSIS — E78.5 HYPERLIPIDEMIA, UNSPECIFIED HYPERLIPIDEMIA TYPE: ICD-10-CM

## 2020-06-23 DIAGNOSIS — E55.9 VITAMIN D DEFICIENCY: ICD-10-CM

## 2020-06-23 DIAGNOSIS — E53.8 B12 DEFICIENCY: ICD-10-CM

## 2020-06-23 DIAGNOSIS — I10 ESSENTIAL HYPERTENSION: ICD-10-CM

## 2020-06-23 DIAGNOSIS — Z23 NEED FOR TDAP VACCINATION: Primary | ICD-10-CM

## 2020-06-23 DIAGNOSIS — Z23 NEED FOR SHINGLES VACCINE: ICD-10-CM

## 2020-06-23 DIAGNOSIS — E66.01 MORBIDLY OBESE (HCC): ICD-10-CM

## 2020-06-23 DIAGNOSIS — R73.9 HYPERGLYCEMIA: ICD-10-CM

## 2020-06-23 LAB
25(OH)D3 SERPL-MCNC: 41.1 NG/ML (ref 30–100)
ALBUMIN SERPL-MCNC: 4.5 G/DL (ref 3.5–5.2)
ALBUMIN/GLOB SERPL: 1.6 G/DL
ALP SERPL-CCNC: 76 U/L (ref 39–117)
ALT SERPL W P-5'-P-CCNC: 20 U/L (ref 1–33)
ANION GAP SERPL CALCULATED.3IONS-SCNC: 12.2 MMOL/L (ref 5–15)
AST SERPL-CCNC: 15 U/L (ref 1–32)
BASOPHILS # BLD AUTO: 0.05 10*3/MM3 (ref 0–0.2)
BASOPHILS NFR BLD AUTO: 0.4 % (ref 0–1.5)
BILIRUB SERPL-MCNC: 0.4 MG/DL (ref 0.2–1.2)
BUN BLD-MCNC: 18 MG/DL (ref 8–23)
BUN/CREAT SERPL: 19.4 (ref 7–25)
CALCIUM SPEC-SCNC: 9.7 MG/DL (ref 8.6–10.5)
CHLORIDE SERPL-SCNC: 99 MMOL/L (ref 98–107)
CHOLEST SERPL-MCNC: 124 MG/DL (ref 0–200)
CO2 SERPL-SCNC: 24.8 MMOL/L (ref 22–29)
CREAT BLD-MCNC: 0.93 MG/DL (ref 0.57–1)
DEPRECATED RDW RBC AUTO: 44.6 FL (ref 37–54)
EOSINOPHIL # BLD AUTO: 0.05 10*3/MM3 (ref 0–0.4)
EOSINOPHIL NFR BLD AUTO: 0.4 % (ref 0.3–6.2)
ERYTHROCYTE [DISTWIDTH] IN BLOOD BY AUTOMATED COUNT: 13 % (ref 12.3–15.4)
GFR SERPL CREATININE-BSD FRML MDRD: 61 ML/MIN/1.73
GLOBULIN UR ELPH-MCNC: 2.8 GM/DL
GLUCOSE BLD-MCNC: 106 MG/DL (ref 65–99)
HCT VFR BLD AUTO: 40.4 % (ref 34–46.6)
HDLC SERPL-MCNC: 48 MG/DL (ref 40–60)
HGB BLD-MCNC: 14.1 G/DL (ref 12–15.9)
IMM GRANULOCYTES # BLD AUTO: 0.13 10*3/MM3 (ref 0–0.05)
IMM GRANULOCYTES NFR BLD AUTO: 1 % (ref 0–0.5)
LDLC SERPL CALC-MCNC: 54 MG/DL (ref 0–100)
LDLC/HDLC SERPL: 1.13 {RATIO}
LYMPHOCYTES # BLD AUTO: 2.02 10*3/MM3 (ref 0.7–3.1)
LYMPHOCYTES NFR BLD AUTO: 14.8 % (ref 19.6–45.3)
MCH RBC QN AUTO: 32.7 PG (ref 26.6–33)
MCHC RBC AUTO-ENTMCNC: 34.9 G/DL (ref 31.5–35.7)
MCV RBC AUTO: 93.7 FL (ref 79–97)
MONOCYTES # BLD AUTO: 1 10*3/MM3 (ref 0.1–0.9)
MONOCYTES NFR BLD AUTO: 7.3 % (ref 5–12)
NEUTROPHILS # BLD AUTO: 10.38 10*3/MM3 (ref 1.7–7)
NEUTROPHILS NFR BLD AUTO: 76.1 % (ref 42.7–76)
NRBC BLD AUTO-RTO: 0 /100 WBC (ref 0–0.2)
PLATELET # BLD AUTO: 394 10*3/MM3 (ref 140–450)
PMV BLD AUTO: 11 FL (ref 6–12)
POTASSIUM BLD-SCNC: 3.7 MMOL/L (ref 3.5–5.2)
PROT SERPL-MCNC: 7.3 G/DL (ref 6–8.5)
RBC # BLD AUTO: 4.31 10*6/MM3 (ref 3.77–5.28)
SODIUM BLD-SCNC: 136 MMOL/L (ref 136–145)
TRIGL SERPL-MCNC: 109 MG/DL (ref 0–150)
TSH SERPL DL<=0.05 MIU/L-ACNC: 3.45 UIU/ML (ref 0.27–4.2)
VIT B12 BLD-MCNC: 373 PG/ML (ref 211–946)
VLDLC SERPL-MCNC: 21.8 MG/DL (ref 5–40)
WBC NRBC COR # BLD: 13.63 10*3/MM3 (ref 3.4–10.8)

## 2020-06-23 PROCEDURE — 96160 PT-FOCUSED HLTH RISK ASSMT: CPT | Performed by: NURSE PRACTITIONER

## 2020-06-23 PROCEDURE — 83036 HEMOGLOBIN GLYCOSYLATED A1C: CPT | Performed by: NURSE PRACTITIONER

## 2020-06-23 PROCEDURE — 99213 OFFICE O/P EST LOW 20 MIN: CPT | Performed by: NURSE PRACTITIONER

## 2020-06-23 PROCEDURE — 82607 VITAMIN B-12: CPT | Performed by: NURSE PRACTITIONER

## 2020-06-23 PROCEDURE — 84443 ASSAY THYROID STIM HORMONE: CPT | Performed by: NURSE PRACTITIONER

## 2020-06-23 PROCEDURE — 80061 LIPID PANEL: CPT | Performed by: NURSE PRACTITIONER

## 2020-06-23 PROCEDURE — 85025 COMPLETE CBC W/AUTO DIFF WBC: CPT | Performed by: NURSE PRACTITIONER

## 2020-06-23 PROCEDURE — 80053 COMPREHEN METABOLIC PANEL: CPT | Performed by: NURSE PRACTITIONER

## 2020-06-23 PROCEDURE — G0439 PPPS, SUBSEQ VISIT: HCPCS | Performed by: NURSE PRACTITIONER

## 2020-06-23 PROCEDURE — 82306 VITAMIN D 25 HYDROXY: CPT | Performed by: NURSE PRACTITIONER

## 2020-06-23 NOTE — PROGRESS NOTES
"Subjective   Sherrie Chand is a 65 y.o. female presents for   Chief Complaint   Patient presents with   • Medicare Wellness-subsequent     fasting    • Hypertension   • Hyperlipidemia       Health Maintenance Due   Topic Date Due   • TDAP/TD VACCINES (1 - Tdap) 01/20/1968   • ZOSTER VACCINE (1 of 2) 01/20/2007       History of Present Illness   Pt present for annual medicare wellness exam and to follow up HTN and Hyperlipidemia.  Pt reports she is stable on current medications and denies new problems.  She states her low back pain flared up last week and she had injections performed at pain management and is feeling much better.      Vitals:    06/23/20 0854 06/23/20 0901   BP: 105/67 103/70   BP Location: Right arm Left arm   Patient Position: Sitting Sitting   Cuff Size: Large Adult Large Adult   Pulse: 79 80   Resp: 16    Temp: 97.6 °F (36.4 °C)    TempSrc: Infrared    SpO2: 95%    Weight: 98.5 kg (217 lb 3.2 oz)    Height: 160 cm (63\")      Body mass index is 38.48 kg/m².    Current Outpatient Medications on File Prior to Visit   Medication Sig Dispense Refill   • atorvastatin (LIPITOR) 40 MG tablet Take 1 tablet by mouth Every Night. 90 tablet 3   • Calcium Carbonate-Vitamin D 600-400 MG-UNIT chewable tablet Chew 1 tablet Daily.     • cyclobenzaprine (FLEXERIL) 10 MG tablet Take 1 tablet by mouth 3 (Three) Times a Day As Needed for Muscle Spasms. 90 tablet 11   • DULoxetine (CYMBALTA) 60 MG capsule TAKE 1 CAPSULE EVERY DAY 30 capsule 0   • gabapentin (NEURONTIN) 800 MG tablet Take 1 tablet by mouth 3 (Three) Times a Day. 90 tablet 11   • HYDROcodone-acetaminophen (NORCO) 7.5-325 MG per tablet Take 1 tablet by mouth 3 (Three) Times a Day As Needed for Severe Pain . DNF before 6/14/2020 90 tablet 0   • lisinopril-hydrochlorothiazide (PRINZIDE,ZESTORETIC) 20-25 MG per tablet TAKE 1 TABLET EVERY DAY 30 tablet 0   • St Johns Wort 150 MG capsule Take 1 capsule by mouth Daily.     • sulindac (CLINORIL) 200 MG " tablet Take 1 tablet by mouth Daily. 90 tablet 3   • vitamin B-6 (PYRIDOXINE) 100 MG tablet Take 300 mg by mouth Daily.     • [DISCONTINUED] HYDROcodone-acetaminophen (NORCO) 7.5-325 MG per tablet Take 1 tablet by mouth 3 (Three) Times a Day As Needed for Severe Pain . 90 tablet 0   • [DISCONTINUED] vitamin B-12 (CYANOCOBALAMIN) 1000 MCG tablet Take 1,000 mcg by mouth Daily.       No current facility-administered medications on file prior to visit.        The following portions of the patient's history were reviewed and updated as appropriate: allergies, current medications, past family history, past medical history, past social history, past surgical history and problem list.    Review of Systems   Constitutional: Negative for chills and fever.   HENT: Negative for sinus pressure and sore throat.    Eyes: Negative for blurred vision.   Respiratory: Negative for cough and shortness of breath.    Cardiovascular: Negative for chest pain.   Gastrointestinal: Negative for abdominal pain.   Endocrine: Negative.    Genitourinary: Negative.    Musculoskeletal: Positive for back pain. Negative for arthralgias and joint swelling.   Skin: Negative for color change.   Allergic/Immunologic: Negative.    Neurological: Negative for dizziness.   Hematological: Negative.    Psychiatric/Behavioral: Negative for behavioral problems.       Objective   Physical Exam   Constitutional: She is oriented to person, place, and time. She appears well-developed and well-nourished. She is obese.  HENT:   Head: Normocephalic and atraumatic.   Right Ear: External ear normal.   Left Ear: External ear normal.   Nose: Nose normal.   Mouth/Throat: Oropharynx is clear and moist.   Eyes: Pupils are equal, round, and reactive to light. Conjunctivae and EOM are normal.   Neck: Normal range of motion. Neck supple. No thyromegaly present.   Cardiovascular: Normal rate, regular rhythm, normal heart sounds and intact distal pulses.   Pulmonary/Chest: Effort  normal and breath sounds normal.   Abdominal: Soft. Bowel sounds are normal.   Musculoskeletal: Normal range of motion.   Lymphadenopathy:     She has no cervical adenopathy.   Neurological: She is alert and oriented to person, place, and time.   Skin: Skin is warm and dry.   Psychiatric: She has a normal mood and affect. Her behavior is normal. Judgment and thought content normal.   Nursing note and vitals reviewed.    PHQ-9 Total Score: 0    Assessment/Plan   Sherrie was seen today for medicare wellness-subsequent, hypertension and hyperlipidemia.    Diagnoses and all orders for this visit:    Need for Tdap vaccination  -     Tdap (ADACEL) 5-2-15.5 LF-MCG/0.5 injection; Inject 0.5 mL into the appropriate muscle as directed by prescriber 1 (One) Time for 1 dose. To be administered at the pharmacy    Need for shingles vaccine  -     Zoster Vac Recomb Adjuvanted (Shingrix) 50 MCG/0.5ML reconstituted suspension; Inject 0.5 mL into the appropriate muscle as directed by prescriber 1 (One) Time for 1 dose. To be administered at the pharmacy    Hyperlipidemia, unspecified hyperlipidemia type  -     Comprehensive Metabolic Panel  -     Lipid Panel  -     TSH    Essential hypertension  -     CBC Auto Differential    B12 deficiency  -     Vitamin B12    Vitamin D deficiency  -     Vitamin D 25 Hydroxy    Other chronic pain    Hyperglycemia  -     Hemoglobin A1c    Morbidly obese (CMS/[facility])  Comments:  Home exercise program encouraged and calorie counting for weight loss information given.        Patient Instructions   Calorie Counting for Weight Loss  Calories are units of energy. Your body needs a certain amount of calories from food to keep you going throughout the day. When you eat more calories than your body needs, your body stores the extra calories as fat. When you eat fewer calories than your body needs, your body burns fat to get the energy it needs.  Calorie counting means keeping track of how many calories you eat  and drink each day. Calorie counting can be helpful if you need to lose weight. If you make sure to eat fewer calories than your body needs, you should lose weight. Ask your health care provider what a healthy weight is for you.  For calorie counting to work, you will need to eat the right number of calories in a day in order to lose a healthy amount of weight per week. A dietitian can help you determine how many calories you need in a day and will give you suggestions on how to reach your calorie goal.  · A healthy amount of weight to lose per week is usually 1-2 lb (0.5-0.9 kg). This usually means that your daily calorie intake should be reduced by 500-750 calories.  · Eating 1,200 - 1,500 calories per day can help most women lose weight.  · Eating 1,500 - 1,800 calories per day can help most men lose weight.  What is my plan?  My goal is to have __________ calories per day.  If I have this many calories per day, I should lose around __________ pounds per week.  What do I need to know about calorie counting?  In order to meet your daily calorie goal, you will need to:  · Find out how many calories are in each food you would like to eat. Try to do this before you eat.  · Decide how much of the food you plan to eat.  · Write down what you ate and how many calories it had. Doing this is called keeping a food log.  To successfully lose weight, it is important to balance calorie counting with a healthy lifestyle that includes regular activity. Aim for 150 minutes of moderate exercise (such as walking) or 75 minutes of vigorous exercise (such as running) each week.  Where do I find calorie information?    The number of calories in a food can be found on a Nutrition Facts label. If a food does not have a Nutrition Facts label, try to look up the calories online or ask your dietitian for help.  Remember that calories are listed per serving. If you choose to have more than one serving of a food, you will have to multiply  "the calories per serving by the amount of servings you plan to eat. For example, the label on a package of bread might say that a serving size is 1 slice and that there are 90 calories in a serving. If you eat 1 slice, you will have eaten 90 calories. If you eat 2 slices, you will have eaten 180 calories.  How do I keep a food log?  Immediately after each meal, record the following information in your food log:  · What you ate. Don't forget to include toppings, sauces, and other extras on the food.  · How much you ate. This can be measured in cups, ounces, or number of items.  · How many calories each food and drink had.  · The total number of calories in the meal.  Keep your food log near you, such as in a small notebook in your pocket, or use a mobile gena or website. Some programs will calculate calories for you and show you how many calories you have left for the day to meet your goal.  What are some calorie counting tips?    · Use your calories on foods and drinks that will fill you up and not leave you hungry:  ? Some examples of foods that fill you up are nuts and nut butters, vegetables, lean proteins, and high-fiber foods like whole grains. High-fiber foods are foods with more than 5 g fiber per serving.  ? Drinks such as sodas, specialty coffee drinks, alcohol, and juices have a lot of calories, yet do not fill you up.  · Eat nutritious foods and avoid empty calories. Empty calories are calories you get from foods or beverages that do not have many vitamins or protein, such as candy, sweets, and soda. It is better to have a nutritious high-calorie food (such as an avocado) than a food with few nutrients (such as a bag of chips).  · Know how many calories are in the foods you eat most often. This will help you calculate calorie counts faster.  · Pay attention to calories in drinks. Low-calorie drinks include water and unsweetened drinks.  · Pay attention to nutrition labels for \"low fat\" or \"fat free\" foods. " These foods sometimes have the same amount of calories or more calories than the full fat versions. They also often have added sugar, starch, or salt, to make up for flavor that was removed with the fat.  · Find a way of tracking calories that works for you. Get creative. Try different apps or programs if writing down calories does not work for you.  What are some portion control tips?  · Know how many calories are in a serving. This will help you know how many servings of a certain food you can have.  · Use a measuring cup to measure serving sizes. You could also try weighing out portions on a kitchen scale. With time, you will be able to estimate serving sizes for some foods.  · Take some time to put servings of different foods on your favorite plates, bowls, and cups so you know what a serving looks like.  · Try not to eat straight from a bag or box. Doing this can lead to overeating. Put the amount you would like to eat in a cup or on a plate to make sure you are eating the right portion.  · Use smaller plates, glasses, and bowls to prevent overeating.  · Try not to multitask (for example, watch TV or use your computer) while eating. If it is time to eat, sit down at a table and enjoy your food. This will help you to know when you are full. It will also help you to be aware of what you are eating and how much you are eating.  What are tips for following this plan?  Reading food labels  · Check the calorie count compared to the serving size. The serving size may be smaller than what you are used to eating.  · Check the source of the calories. Make sure the food you are eating is high in vitamins and protein and low in saturated and trans fats.  Shopping  · Read nutrition labels while you shop. This will help you make healthy decisions before you decide to purchase your food.  · Make a grocery list and stick to it.  Cooking  · Try to cook your favorite foods in a healthier way. For example, try baking instead of  "frying.  · Use low-fat dairy products.  Meal planning  · Use more fruits and vegetables. Half of your plate should be fruits and vegetables.  · Include lean proteins like poultry and fish.  How do I count calories when eating out?  · Ask for smaller portion sizes.  · Consider sharing an entree and sides instead of getting your own entree.  · If you get your own entree, eat only half. Ask for a box at the beginning of your meal and put the rest of your entree in it so you are not tempted to eat it.  · If calories are listed on the menu, choose the lower calorie options.  · Choose dishes that include vegetables, fruits, whole grains, low-fat dairy products, and lean protein.  · Choose items that are boiled, broiled, grilled, or steamed. Stay away from items that are buttered, battered, fried, or served with cream sauce. Items labeled \"crispy\" are usually fried, unless stated otherwise.  · Choose water, low-fat milk, unsweetened iced tea, or other drinks without added sugar. If you want an alcoholic beverage, choose a lower calorie option such as a glass of wine or light beer.  · Ask for dressings, sauces, and syrups on the side. These are usually high in calories, so you should limit the amount you eat.  · If you want a salad, choose a garden salad and ask for grilled meats. Avoid extra toppings like richardson, cheese, or fried items. Ask for the dressing on the side, or ask for olive oil and vinegar or lemon to use as dressing.  · Estimate how many servings of a food you are given. For example, a serving of cooked rice is ½ cup or about the size of half a baseball. Knowing serving sizes will help you be aware of how much food you are eating at restaurants. The list below tells you how big or small some common portion sizes are based on everyday objects:  ? 1 oz--4 stacked dice.  ? 3 oz--1 deck of cards.  ? 1 tsp--1 die.  ? 1 Tbsp--½ a ping-pong ball.  ? 2 Tbsp--1 ping-pong ball.  ? ½ cup--½ baseball.  ? 1 cup--1 " baseball.  Summary  · Calorie counting means keeping track of how many calories you eat and drink each day. If you eat fewer calories than your body needs, you should lose weight.  · A healthy amount of weight to lose per week is usually 1-2 lb (0.5-0.9 kg). This usually means reducing your daily calorie intake by 500-750 calories.  · The number of calories in a food can be found on a Nutrition Facts label. If a food does not have a Nutrition Facts label, try to look up the calories online or ask your dietitian for help.  · Use your calories on foods and drinks that will fill you up, and not on foods and drinks that will leave you hungry.  · Use smaller plates, glasses, and bowls to prevent overeating.  This information is not intended to replace advice given to you by your health care provider. Make sure you discuss any questions you have with your health care provider.  Document Released: 12/18/2006 Document Revised: 09/06/2019 Document Reviewed: 11/17/2017  [employer] Patient Education © 2020 [employer]

## 2020-06-23 NOTE — PATIENT INSTRUCTIONS

## 2020-06-24 LAB — HBA1C MFR BLD: 5.2 % (ref 3.5–5.6)

## 2020-07-14 ENCOUNTER — OFFICE VISIT (OUTPATIENT)
Dept: PAIN MEDICINE | Facility: CLINIC | Age: 65
End: 2020-07-14

## 2020-07-14 VITALS — BODY MASS INDEX: 34.15 KG/M2 | WEIGHT: 200 LBS | HEIGHT: 64 IN | RESPIRATION RATE: 16 BRPM

## 2020-07-14 DIAGNOSIS — Z79.899 HIGH RISK MEDICATION USE: ICD-10-CM

## 2020-07-14 DIAGNOSIS — M47.817 LUMBOSACRAL SPONDYLOSIS WITHOUT MYELOPATHY: ICD-10-CM

## 2020-07-14 DIAGNOSIS — G89.4 CHRONIC PAIN SYNDROME: Primary | ICD-10-CM

## 2020-07-14 DIAGNOSIS — M25.50 POLYARTHRALGIA: ICD-10-CM

## 2020-07-14 DIAGNOSIS — M46.1 SACROILIITIS (HCC): ICD-10-CM

## 2020-07-14 PROCEDURE — 99443 PR PHYS/QHP TELEPHONE EVALUATION 21-30 MIN: CPT | Performed by: ANESTHESIOLOGY

## 2020-07-14 RX ORDER — HYDROCODONE BITARTRATE AND ACETAMINOPHEN 7.5; 325 MG/1; MG/1
1 TABLET ORAL 3 TIMES DAILY PRN
Qty: 90 TABLET | Refills: 0 | Status: SHIPPED | OUTPATIENT
Start: 2020-07-14 | End: 2020-09-15 | Stop reason: SDUPTHER

## 2020-07-14 RX ORDER — HYDROCODONE BITARTRATE AND ACETAMINOPHEN 7.5; 325 MG/1; MG/1
1 TABLET ORAL 3 TIMES DAILY PRN
Qty: 90 TABLET | Refills: 0 | Status: SHIPPED | OUTPATIENT
Start: 2020-08-13 | End: 2020-09-15 | Stop reason: SDUPTHER

## 2020-07-14 NOTE — PROGRESS NOTES
CC  hip/buttock, right leg, lower back pain  65-year-old female with chronic back pain here for follow-up by telephone.    Good relief with SI injection but mostly right side.   Chronic right>left hip/right gluteal pain denies weakness or injury.  Good relief with right SI injection but did not last long.    Chronic back pain radiating to bilateral lower extremity worse on the left with aching numbness in both legs worse with standing walking or activity.  Denies new weakness saddle anesthesia bladder bowel incontinence.    Pain interfering with daily activity and sleep.  Marginal relief with physical therapy or anti-inflammatories.      Utilizes hydrocodone with good relief functional benefit and side effects.    Marginal relief with caudal JESSICA in the past.  Declines LESI due to past experience with post dural puncture headache.    L-spine MRI 2016: Multilevel degenerative disc disease and degenerative facet change as well as  multilevel postoperative changes detailed above.  There is a 14 mm of anterolisthesis of L4 on L5 which has worsened from the prior exam where this was only 4 mm.  This results in severe bilateral neural foraminal narrowing but no spinal canal stenosis.    Pain Assessment   Cause of Pain: surgery  Location of Pain: Lower Back, R Hip, L Hip, R Leg, L Leg  Description of Pain: Dull/Aching, Throbbing, Stabbing  Previous Pain Rating : 7  Current Pain Ratin  Aggravating Factors: Activity  Alleviating Factors: Rest, Medication  Previous Treatments: Epidural Steroids, Narcotic Pain Medication, Physical Therapy  Previous Diagnostic Studies: MRI   PEG Assessment   What number best describes your pain on average in the past week?7  What number best describes how, during the past week, pain has interfered with your enjoyment of life?8  What number best describes how, during the past week, pain has interfered with your general activity? 7     has a past medical history of Arthritis, Depression, Low  back pain, Osteoporosis, and Spinal stenosis (5/9/2017).     has a past surgical history that includes Cervical Curettage; Anterior cervical discectomy; Total vaginal hysterectomy; Appendectomy; Back surgery (2017); and Mediastinotomy for exploration / drainage / biopsy / removal Fb w/ cervical approach (09/2011).     Social History     Tobacco Use   • Smoking status: Former Smoker     Packs/day: 0.50     Types: Cigarettes     Last attempt to quit: 2017     Years since quitting: 3.5   • Smokeless tobacco: Never Used   Substance Use Topics   • Alcohol use: No     Frequency: Never     Review of Systems        See HPI    General       Denies fever/chills, fatigue and sleep problems.    Eyes       Denies blurry vision and double vision.    ENT       Denies decreased hearing, sore throat and ears ringing.    CV       Denies chest pain and fainting.    Resp       Denies shortness of breath and cough.    GI       Denies heartburn, constipation, nausea, vomiting and diarrhea.           Denies pain on urination, incontinence and increased frequency.    MS       low back pain bilateral upper extremity    Derm       Denies rash and itching.    Neuro       Denies numbness, tingling, loss of balance and history of seizures.    Psych       Denies anxiety and depression.    Endo       Denies weight change and thirsty all the time.    Heme       Denies easy bruising, bleeding and enlarged lymph nodes.  Vitals:    07/14/20 0943   Resp: 16     Physical Exam   NAD.      PHQ 9 on chart                     opioid risk tool low risk      Assessment /Plan     Sherrie was seen today for back pain and follow-up.    Diagnoses and all orders for this visit:    Chronic pain syndrome  -     HYDROcodone-acetaminophen (NORCO) 7.5-325 MG per tablet; Take 1 tablet by mouth 3 (Three) Times a Day As Needed for Severe Pain .  -     HYDROcodone-acetaminophen (NORCO) 7.5-325 MG per tablet; Take 1 tablet by mouth 3 (Three) Times a Day As Needed for  Severe Pain . DNF before 8/13/2020    Lumbosacral spondylosis without myelopathy    Polyarthralgia    Sacroiliitis (CMS/HCC)    High risk medication use      Summary   65-year-old female with chronic back pain here for follow-up.    chronic pain from lumbar DDD spondylosis with radicular pain.  Chronic polyarthralgia.    repeat bilateral SI injections as needed.    Continue hydrocodone 7.5/25 3 times daily.  UDS and inspect reviewed.  Discussed risk of tolerance, dependence, respiratory depression, coma and death associated with use of oral opioids for treatment of chronic nonmalignant pain.      Continue gabapentin/flexeril.  Cont. topical compounded neuropathic/anti-inflammatory cream with ketamine.    Telemedicine done to avoid coronavirus exposure.Discussed CDC guidelines and precaution regarding current epidemic.  Unable to complete visit using a video connection to the patient. A phone visit was used to complete visit. Total time of discussion was 23 minutes      RTC 2 mo

## 2020-07-16 RX ORDER — LISINOPRIL AND HYDROCHLOROTHIAZIDE 25; 20 MG/1; MG/1
1 TABLET ORAL DAILY
Qty: 90 TABLET | Refills: 2 | OUTPATIENT
Start: 2020-07-16

## 2020-07-16 RX ORDER — DULOXETIN HYDROCHLORIDE 60 MG/1
60 CAPSULE, DELAYED RELEASE ORAL DAILY
Qty: 90 CAPSULE | Refills: 1 | Status: SHIPPED | OUTPATIENT
Start: 2020-07-16 | End: 2020-12-14

## 2020-07-16 RX ORDER — LISINOPRIL AND HYDROCHLOROTHIAZIDE 25; 20 MG/1; MG/1
1 TABLET ORAL DAILY
Qty: 90 TABLET | Refills: 0 | Status: SHIPPED | OUTPATIENT
Start: 2020-07-16 | End: 2020-08-13

## 2020-07-16 RX ORDER — DULOXETIN HYDROCHLORIDE 60 MG/1
60 CAPSULE, DELAYED RELEASE ORAL DAILY
Qty: 90 CAPSULE | Refills: 3 | OUTPATIENT
Start: 2020-07-16

## 2020-07-22 ENCOUNTER — CLINICAL SUPPORT (OUTPATIENT)
Dept: FAMILY MEDICINE CLINIC | Facility: CLINIC | Age: 65
End: 2020-07-22

## 2020-07-22 DIAGNOSIS — I10 ESSENTIAL HYPERTENSION: Primary | ICD-10-CM

## 2020-07-22 LAB
BASOPHILS # BLD AUTO: 0.04 10*3/MM3 (ref 0–0.2)
BASOPHILS NFR BLD AUTO: 0.4 % (ref 0–1.5)
DEPRECATED RDW RBC AUTO: 47.1 FL (ref 37–54)
EOSINOPHIL # BLD AUTO: 0.08 10*3/MM3 (ref 0–0.4)
EOSINOPHIL NFR BLD AUTO: 0.7 % (ref 0.3–6.2)
ERYTHROCYTE [DISTWIDTH] IN BLOOD BY AUTOMATED COUNT: 13.1 % (ref 12.3–15.4)
HCT VFR BLD AUTO: 40 % (ref 34–46.6)
HGB BLD-MCNC: 13 G/DL (ref 12–15.9)
IMM GRANULOCYTES # BLD AUTO: 0.21 10*3/MM3 (ref 0–0.05)
IMM GRANULOCYTES NFR BLD AUTO: 1.9 % (ref 0–0.5)
LYMPHOCYTES # BLD AUTO: 1.83 10*3/MM3 (ref 0.7–3.1)
LYMPHOCYTES NFR BLD AUTO: 16.5 % (ref 19.6–45.3)
MCH RBC QN AUTO: 31.6 PG (ref 26.6–33)
MCHC RBC AUTO-ENTMCNC: 32.5 G/DL (ref 31.5–35.7)
MCV RBC AUTO: 97.3 FL (ref 79–97)
MONOCYTES # BLD AUTO: 0.82 10*3/MM3 (ref 0.1–0.9)
MONOCYTES NFR BLD AUTO: 7.4 % (ref 5–12)
NEUTROPHILS NFR BLD AUTO: 73.1 % (ref 42.7–76)
NEUTROPHILS NFR BLD AUTO: 8.09 10*3/MM3 (ref 1.7–7)
NRBC BLD AUTO-RTO: 0 /100 WBC (ref 0–0.2)
PLATELET # BLD AUTO: 366 10*3/MM3 (ref 140–450)
PMV BLD AUTO: 11 FL (ref 6–12)
RBC # BLD AUTO: 4.11 10*6/MM3 (ref 3.77–5.28)
WBC # BLD AUTO: 11.07 10*3/MM3 (ref 3.4–10.8)

## 2020-07-22 PROCEDURE — 36415 COLL VENOUS BLD VENIPUNCTURE: CPT | Performed by: NURSE PRACTITIONER

## 2020-07-22 PROCEDURE — 85025 COMPLETE CBC W/AUTO DIFF WBC: CPT | Performed by: NURSE PRACTITIONER

## 2020-07-22 NOTE — PROGRESS NOTES
Venipuncture Blood Specimen Collection  Venipuncture performed in right arm by Merced Garsia MA with good hemostasis. Patient tolerated the procedure well without complications.   07/22/20   MAR Maya, APRN

## 2020-08-13 RX ORDER — LISINOPRIL AND HYDROCHLOROTHIAZIDE 25; 20 MG/1; MG/1
TABLET ORAL
Qty: 90 TABLET | Refills: 0 | Status: SHIPPED | OUTPATIENT
Start: 2020-08-13 | End: 2020-12-03

## 2020-09-08 RX ORDER — SULINDAC 200 MG/1
TABLET ORAL
Qty: 90 TABLET | Refills: 3 | Status: SHIPPED | OUTPATIENT
Start: 2020-09-08 | End: 2021-05-06

## 2020-09-08 RX ORDER — ATORVASTATIN CALCIUM 40 MG/1
TABLET, FILM COATED ORAL
Qty: 90 TABLET | Refills: 3 | Status: SHIPPED | OUTPATIENT
Start: 2020-09-08 | End: 2021-07-08

## 2020-09-15 ENCOUNTER — OFFICE VISIT (OUTPATIENT)
Dept: PAIN MEDICINE | Facility: CLINIC | Age: 65
End: 2020-09-15

## 2020-09-15 VITALS — BODY MASS INDEX: 35.44 KG/M2 | RESPIRATION RATE: 16 BRPM | WEIGHT: 200 LBS | HEIGHT: 63 IN

## 2020-09-15 DIAGNOSIS — Z79.899 HIGH RISK MEDICATION USE: ICD-10-CM

## 2020-09-15 DIAGNOSIS — M25.50 POLYARTHRALGIA: ICD-10-CM

## 2020-09-15 DIAGNOSIS — M47.817 LUMBOSACRAL SPONDYLOSIS WITHOUT MYELOPATHY: ICD-10-CM

## 2020-09-15 DIAGNOSIS — G89.4 CHRONIC PAIN SYNDROME: Primary | ICD-10-CM

## 2020-09-15 DIAGNOSIS — M46.1 SACROILIITIS (HCC): ICD-10-CM

## 2020-09-15 PROCEDURE — 99443 PR PHYS/QHP TELEPHONE EVALUATION 21-30 MIN: CPT | Performed by: ANESTHESIOLOGY

## 2020-09-15 RX ORDER — HYDROCODONE BITARTRATE AND ACETAMINOPHEN 7.5; 325 MG/1; MG/1
1 TABLET ORAL 3 TIMES DAILY PRN
Qty: 90 TABLET | Refills: 0 | Status: SHIPPED | OUTPATIENT
Start: 2020-10-14 | End: 2020-11-09 | Stop reason: SDUPTHER

## 2020-09-15 RX ORDER — HYDROCODONE BITARTRATE AND ACETAMINOPHEN 7.5; 325 MG/1; MG/1
1 TABLET ORAL 3 TIMES DAILY PRN
Qty: 90 TABLET | Refills: 0 | Status: SHIPPED | OUTPATIENT
Start: 2020-09-15 | End: 2020-10-07 | Stop reason: SDUPTHER

## 2020-09-15 NOTE — PROGRESS NOTES
CC  hip/buttock, right leg, lower back pain  65-year-old female with chronic back pain here for follow-up by tel.     Worsening right>left hip/right gluteal pain denies weakness or injury.  Good relief with right SI injection , requests repeat.    Chronic back pain radiating to bilateral lower extremity worse on the left with aching numbness in both legs worse with standing walking or activity.  Denies new weakness saddle anesthesia bladder bowel incontinence.    Pain interfering with daily activity and sleep.  Marginal relief with physical therapy or anti-inflammatories.      Utilizes hydrocodone with good relief functional benefit and side effects.    Marginal relief with caudal JESSICA in the past.  Declines LESI due to past experience with post dural puncture headache.    L-spine MRI 2016: Multilevel degenerative disc disease and degenerative facet change as well as  multilevel postoperative changes detailed above.  There is a 14 mm of anterolisthesis of L4 on L5 which has worsened from the prior exam where this was only 4 mm.  This results in severe bilateral neural foraminal narrowing but no spinal canal stenosis.    Pain Assessment   Cause of Pain: surgery  Location of Pain: Lower Back, R Hip, L Hip, R Leg, L Leg  Description of Pain: Dull/Aching, Throbbing, Stabbing  Previous Pain Rating : 7  Current Pain Ratin  Aggravating Factors: Activity  Alleviating Factors: Rest, Medication  Previous Treatments: Epidural Steroids, Narcotic Pain Medication, Physical Therapy  Previous Diagnostic Studies: MRI   PEG Assessment   What number best describes your pain on average in the past week?7  What number best describes how, during the past week, pain has interfered with your enjoyment of life?8  What number best describes how, during the past week, pain has interfered with your general activity? 9     has a past medical history of Arthritis, Depression, Low back pain, Osteoporosis, and Spinal stenosis (2017).      has a past surgical history that includes Cervical Curettage; Anterior cervical discectomy; Total vaginal hysterectomy; Appendectomy; Back surgery (2017); and Mediastinotomy for exploration / drainage / biopsy / removal Fb w/ cervical approach (09/2011).     Social History     Tobacco Use   • Smoking status: Former Smoker     Packs/day: 0.50     Types: Cigarettes     Quit date: 2017     Years since quitting: 3.7   • Smokeless tobacco: Never Used   Substance Use Topics   • Alcohol use: No     Frequency: Never     Review of Systems        See HPI    General       Denies fever/chills, fatigue and sleep problems.    Eyes       Denies blurry vision and double vision.    ENT       Denies decreased hearing, sore throat and ears ringing.    CV       Denies chest pain and fainting.    Resp       Denies shortness of breath and cough.    GI       Denies heartburn, constipation, nausea, vomiting and diarrhea.           Denies pain on urination, incontinence and increased frequency.    MS       low back pain bilateral upper extremity    Derm       Denies rash and itching.    Neuro       Denies numbness, tingling, loss of balance and history of seizures.    Psych       Denies anxiety and depression.    Endo       Denies weight change and thirsty all the time.    Heme       Denies easy bruising, bleeding and enlarged lymph nodes.  Vitals:    09/15/20 0935   Resp: 16     Physical Exam   NAD.      PHQ 9 on chart                     opioid risk tool low risk      Assessment /Plan     Sherrie was seen today for back pain and follow-up.    Diagnoses and all orders for this visit:    Chronic pain syndrome  -     HYDROcodone-acetaminophen (NORCO) 7.5-325 MG per tablet; Take 1 tablet by mouth 3 (Three) Times a Day As Needed for Severe Pain .  -     HYDROcodone-acetaminophen (NORCO) 7.5-325 MG per tablet; Take 1 tablet by mouth 3 (Three) Times a Day As Needed for Severe Pain . DNF before 10/14/2020    Lumbosacral spondylosis without  myelopathy    Polyarthralgia    Sacroiliitis (CMS/HCC)  -     Ambulatory Referral to Pain Management    High risk medication use      Summary   65-year-old female with chronic back pain here for follow-up.    chronic pain from lumbar DDD spondylosis with radicular pain.  Chronic polyarthralgia.    Schedule for repeat right SI injections as needed.    Continue hydrocodone 7.5/25 3 times daily.  UDS and inspect reviewed.  Discussed risk of tolerance, dependence, respiratory depression, coma and death associated with use of oral opioids for treatment of chronic nonmalignant pain.      Continue gabapentin/flexeril.  Cont. topical compounded neuropathic/anti-inflammatory cream with ketamine.    Telemedicine done to avoid coronavirus exposure  A phone visit was used to complete visit. Total time of discussion was 21 minutes      RTC 2 mo

## 2020-10-07 ENCOUNTER — HOSPITAL ENCOUNTER (OUTPATIENT)
Dept: PAIN MEDICINE | Facility: HOSPITAL | Age: 65
Discharge: HOME | End: 2020-10-07

## 2020-10-07 VITALS
SYSTOLIC BLOOD PRESSURE: 101 MMHG | HEIGHT: 64 IN | DIASTOLIC BLOOD PRESSURE: 66 MMHG | WEIGHT: 200 LBS | RESPIRATION RATE: 16 BRPM | OXYGEN SATURATION: 97 % | BODY MASS INDEX: 34.15 KG/M2 | TEMPERATURE: 97.6 F | HEART RATE: 73 BPM

## 2020-10-07 DIAGNOSIS — M46.1 SACROILIITIS (HCC): Primary | ICD-10-CM

## 2020-10-07 DIAGNOSIS — R52 PAIN: ICD-10-CM

## 2020-10-07 PROCEDURE — 77003 FLUOROGUIDE FOR SPINE INJECT: CPT

## 2020-10-07 PROCEDURE — 0 IOPAMIDOL 41 % SOLUTION: Performed by: ANESTHESIOLOGY

## 2020-10-07 PROCEDURE — 25010000002 METHYLPREDNISOLONE PER 40 MG

## 2020-10-07 PROCEDURE — 27096 INJECT SACROILIAC JOINT: CPT | Performed by: ANESTHESIOLOGY

## 2020-10-07 RX ORDER — METHYLPREDNISOLONE ACETATE 40 MG/ML
INJECTION, SUSPENSION INTRA-ARTICULAR; INTRALESIONAL; INTRAMUSCULAR; SOFT TISSUE
Status: DISCONTINUED
Start: 2020-10-07 | End: 2020-10-08 | Stop reason: HOSPADM

## 2020-10-07 RX ORDER — METHYLPREDNISOLONE ACETATE 40 MG/ML
40 INJECTION, SUSPENSION INTRA-ARTICULAR; INTRALESIONAL; INTRAMUSCULAR; SOFT TISSUE ONCE
Status: COMPLETED | OUTPATIENT
Start: 2020-10-07 | End: 2020-10-07

## 2020-10-07 RX ORDER — BUPIVACAINE HYDROCHLORIDE 2.5 MG/ML
10 INJECTION, SOLUTION INFILTRATION; PERINEURAL ONCE
Status: COMPLETED | OUTPATIENT
Start: 2020-10-07 | End: 2020-10-07

## 2020-10-07 RX ORDER — BUPIVACAINE HYDROCHLORIDE 2.5 MG/ML
INJECTION, SOLUTION EPIDURAL; INFILTRATION; INTRACAUDAL
Status: DISCONTINUED
Start: 2020-10-07 | End: 2020-10-08 | Stop reason: HOSPADM

## 2020-10-07 RX ADMIN — BUPIVACAINE HYDROCHLORIDE 4 ML: 2.5 INJECTION, SOLUTION INFILTRATION; PERINEURAL at 11:59

## 2020-10-07 RX ADMIN — IOPAMIDOL 2 ML: 408 INJECTION, SOLUTION INTRATHECAL at 11:58

## 2020-10-07 RX ADMIN — METHYLPREDNISOLONE ACETATE 40 MG: 40 INJECTION, SUSPENSION INTRA-ARTICULAR; INTRALESIONAL; INTRAMUSCULAR; SOFT TISSUE at 11:59

## 2020-10-07 NOTE — PROCEDURES
"Subjective    CC righthip/ buttock pain  Sherrie Chand is a 65 y.o. female with sacroiliitis here for right Si injection.   No anticoagulation    Pain Assessment   Location of Pain: Lower Back, R Hip, Description of Pain: Dull/Aching, Throbbing, Stabbing  Previous Pain Rating :6  Current Pain Ratin  Aggravating Factors: Activity  Alleviating Factors: Rest, Medication    The following portions of the patient's history were reviewed and updated as appropriate: allergies, current medications, past family history, past medical history, past social history, past surgical history and problem list.    Review of Systems  As in HPI  Objective   Physical Exam   Constitutional: She is oriented to person, place, and time. She appears well-developed. No distress.   Cardiovascular: Normal rate.   Pulmonary/Chest: Effort normal.   Musculoskeletal:      Lumbar back: She exhibits decreased range of motion and tenderness.   Neurological: She is alert and oriented to person, place, and time.     /78 (BP Location: Right arm, Patient Position: Sitting)   Pulse 78   Temp 97.6 °F (36.4 °C) (Core)   Resp 16   Ht 162.6 cm (64\")   Wt 90.7 kg (200 lb)   LMP  (LMP Unknown)   SpO2 99%   BMI 34.33 kg/m²     Assessment/Plan    underwent right SI injection  RTC 4-6 weeks or as needed for repeat    DATE OF PROCEDURE:  10/7/2020    PREOPERATIVE DIAGNOSIS:  sacroiliitis    POSTOPERATIVE DIAGNOSIS: same    PROCEDURE PERFORMED: Right SACROILIAC JOINT INJECTION    The patient understands the risks and benefits of the procedure and wishes to proceed. The patient was seen in the preoperative area. Patient's consent was obtained and updated. Vitals were taken. Patient was then brought to the procedure suite and placed in prone position for bilateral sacroiliac joint injection. The appropriate anatomic area was widely prepped with Chloraprep and draped in a sterile fashion. Noninvasive monitoring per routine anesthesia protocol was " placed. Under fluoroscopic guidance using an AP view, a 22 gauge curved tip spinal needle was passed through skin anesthetized with 1% Lidocaine without epinephrine. The needle tip was guided to the lower pole of the joint using fluoroscopy. 1 mL of  preservative free contrast was injected into the joint to confirm location. A clear outline was obtained and 5 mL of steroid solution containing 4 mL 0.25% bupivacaine, and 1mL 40mg Depomedrol was injected. The patient tolerated with no maurilio-procedural complications.  A sterile dressing was placed over the puncture sites.

## 2020-11-09 ENCOUNTER — OFFICE VISIT (OUTPATIENT)
Dept: PAIN MEDICINE | Facility: CLINIC | Age: 65
End: 2020-11-09

## 2020-11-09 ENCOUNTER — TELEPHONE (OUTPATIENT)
Dept: PAIN MEDICINE | Facility: HOSPITAL | Age: 65
End: 2020-11-09

## 2020-11-09 VITALS — HEIGHT: 64 IN | WEIGHT: 200 LBS | RESPIRATION RATE: 16 BRPM | BODY MASS INDEX: 34.15 KG/M2

## 2020-11-09 DIAGNOSIS — M46.1 SACROILIITIS (HCC): ICD-10-CM

## 2020-11-09 DIAGNOSIS — Z79.899 HIGH RISK MEDICATION USE: ICD-10-CM

## 2020-11-09 DIAGNOSIS — M47.817 LUMBOSACRAL SPONDYLOSIS WITHOUT MYELOPATHY: ICD-10-CM

## 2020-11-09 DIAGNOSIS — G89.4 CHRONIC PAIN SYNDROME: Primary | ICD-10-CM

## 2020-11-09 DIAGNOSIS — M25.50 POLYARTHRALGIA: ICD-10-CM

## 2020-11-09 PROCEDURE — 99443 PR PHYS/QHP TELEPHONE EVALUATION 21-30 MIN: CPT | Performed by: ANESTHESIOLOGY

## 2020-11-09 RX ORDER — HYDROCODONE BITARTRATE AND ACETAMINOPHEN 7.5; 325 MG/1; MG/1
1 TABLET ORAL 3 TIMES DAILY PRN
Qty: 90 TABLET | Refills: 0 | Status: SHIPPED | OUTPATIENT
Start: 2020-11-13 | End: 2021-01-07 | Stop reason: SDUPTHER

## 2020-11-09 RX ORDER — GABAPENTIN 800 MG/1
800 TABLET ORAL 3 TIMES DAILY
Qty: 90 TABLET | Refills: 11 | Status: SHIPPED | OUTPATIENT
Start: 2020-11-09 | End: 2021-12-06

## 2020-11-09 RX ORDER — HYDROCODONE BITARTRATE AND ACETAMINOPHEN 7.5; 325 MG/1; MG/1
1 TABLET ORAL 3 TIMES DAILY PRN
Qty: 90 TABLET | Refills: 0 | Status: SHIPPED | OUTPATIENT
Start: 2020-12-13 | End: 2021-01-07 | Stop reason: SDUPTHER

## 2020-11-09 NOTE — PROGRESS NOTES
CC  hip/buttock, right leg, lower back pain  65-year-old female with chronic back pain here for follow-up by tel.     Repeat right SI injection last visit with good relief.  Symptoms gradually returning.  Chronic right>left hip/right gluteal pain denies weakness or injury.     Chronic back pain radiating to bilateral lower extremity worse on the left with aching numbness in both legs worse with standing walking or activity.  Denies new weakness saddle anesthesia bladder bowel incontinence.    Pain interfering with daily activity and sleep.  Marginal relief with physical therapy or anti-inflammatories.      Utilizes hydrocodone with good relief functional benefit and side effects.    Marginal relief with caudal JESSICA in the past.  Declines LESI due to past experience with post dural puncture headache.    L-spine MRI 2016: Multilevel degenerative disc disease and degenerative facet change as well as  multilevel postoperative changes detailed above.  There is a 14 mm of anterolisthesis of L4 on L5 which has worsened from the prior exam where this was only 4 mm.  This results in severe bilateral neural foraminal narrowing but no spinal canal stenosis.    Pain Assessment   Cause of Pain: surgery  Location of Pain: Lower Back, R Hip, L Hip, R Leg, L Leg  Description of Pain: Dull/Aching, Throbbing, Stabbing  Previous Pain Rating : 7  Current Pain Ratin  Aggravating Factors: Activity  Alleviating Factors: Rest, Medication  Previous Treatments: Epidural Steroids, Narcotic Pain Medication, Physical Therapy  Previous Diagnostic Studies: MRI   PEG Assessment   What number best describes your pain on average in the past week?5  What number best describes how, during the past week, pain has interfered with your enjoyment of life?8  What number best describes how, during the past week, pain has interfered with your general activity? 7     has a past medical history of Arthritis, Depression, Low back pain, Osteoporosis, and  Spinal stenosis (5/9/2017).     has a past surgical history that includes Cervical Curettage; Anterior cervical discectomy; Total vaginal hysterectomy; Appendectomy; Back surgery (2017); and Mediastinotomy for exploration / drainage / biopsy / removal Fb w/ cervical approach (09/2011).     Social History     Tobacco Use   • Smoking status: Former Smoker     Packs/day: 0.50     Types: Cigarettes     Quit date: 2017     Years since quitting: 3.8   • Smokeless tobacco: Never Used   Substance Use Topics   • Alcohol use: No     Frequency: Never     Review of Systems        See HPI    General       Denies fever/chills, fatigue and sleep problems.    Eyes       Denies blurry vision and double vision.    ENT       Denies decreased hearing, sore throat and ears ringing.    CV       Denies chest pain and fainting.    Resp       Denies shortness of breath and cough.    GI       Denies heartburn, constipation, nausea, vomiting and diarrhea.           Denies pain on urination, incontinence and increased frequency.    MS       low back pain bilateral upper extremity    Derm       Denies rash and itching.    Neuro       Denies numbness, tingling, loss of balance and history of seizures.    Psych       Denies anxiety and depression.    Endo       Denies weight change and thirsty all the time.    Heme       Denies easy bruising, bleeding and enlarged lymph nodes.  Vitals:    11/09/20 1056   Resp: 16     Physical Exam   NAD.      PHQ 9 on chart                     opioid risk tool low risk      Assessment /Plan     Diagnoses and all orders for this visit:    1. Chronic pain syndrome (Primary)  -     HYDROcodone-acetaminophen (NORCO) 7.5-325 MG per tablet; Take 1 tablet by mouth 3 (Three) Times a Day As Needed for Severe Pain .  Dispense: 90 tablet; Refill: 0  -     gabapentin (NEURONTIN) 800 MG tablet; Take 1 tablet by mouth 3 (Three) Times a Day.  Dispense: 90 tablet; Refill: 11  -     HYDROcodone-acetaminophen (NORCO) 7.5-325 MG  per tablet; Take 1 tablet by mouth 3 (Three) Times a Day As Needed for Severe Pain . DNF before 12/13/2020  Dispense: 90 tablet; Refill: 0    2. Lumbosacral spondylosis without myelopathy    3. Polyarthralgia    4. Sacroiliitis (CMS/HCC)    5. High risk medication use      Summary   65-year-old female with chronic back pain here for follow-up.    chronic pain from lumbar DDD spondylosis with radicular pain.  Chronic polyarthralgia.  Good relief of repeat SI injection last visit.    Continue hydrocodone 7.5/25 3 times daily.  UDS and inspect reviewed.  Discussed risk of tolerance, dependence, respiratory depression, coma and death associated with use of oral opioids for treatment of chronic nonmalignant pain.      Continue gabapentin/flexeril.  Cont. topical compounded neuropathic/anti-inflammatory cream with ketamine.  Repeat SI injections as needed.    Telemedicine done to avoid coronavirus exposure  A phone visit was used to complete visit. Total time of discussion was 23 minutes      RTC 2 mo

## 2020-11-11 ENCOUNTER — LAB (OUTPATIENT)
Dept: LAB | Facility: HOSPITAL | Age: 65
End: 2020-11-11

## 2020-12-03 RX ORDER — LISINOPRIL AND HYDROCHLOROTHIAZIDE 25; 20 MG/1; MG/1
TABLET ORAL
Qty: 90 TABLET | Refills: 0 | Status: SHIPPED | OUTPATIENT
Start: 2020-12-03 | End: 2020-12-27

## 2020-12-14 RX ORDER — DULOXETIN HYDROCHLORIDE 60 MG/1
CAPSULE, DELAYED RELEASE ORAL
Qty: 90 CAPSULE | Refills: 0 | Status: SHIPPED | OUTPATIENT
Start: 2020-12-14 | End: 2021-03-03 | Stop reason: SDUPTHER

## 2020-12-21 NOTE — PATIENT INSTRUCTIONS
Health Maintenance Due   Topic Date Due   • TDAP/TD VACCINES (1 - Tdap) 01/20/1976   • ZOSTER VACCINE (1 of 2) 01/20/2007   • MAMMOGRAM  06/24/2020   • COLOGUARD  07/14/2020   • INFLUENZA VACCINE  08/01/2020     Return cologuard. Needs CTneck, mammogram and CT$ chest

## 2020-12-22 ENCOUNTER — OFFICE VISIT (OUTPATIENT)
Dept: FAMILY MEDICINE CLINIC | Facility: CLINIC | Age: 65
End: 2020-12-22

## 2020-12-22 VITALS
HEIGHT: 64 IN | TEMPERATURE: 98.4 F | BODY MASS INDEX: 38.89 KG/M2 | OXYGEN SATURATION: 96 % | HEART RATE: 99 BPM | WEIGHT: 227.8 LBS | DIASTOLIC BLOOD PRESSURE: 63 MMHG | SYSTOLIC BLOOD PRESSURE: 115 MMHG

## 2020-12-22 DIAGNOSIS — F32.A DEPRESSION, UNSPECIFIED DEPRESSION TYPE: ICD-10-CM

## 2020-12-22 DIAGNOSIS — Z12.2 ENCOUNTER FOR SCREENING FOR LUNG CANCER: ICD-10-CM

## 2020-12-22 DIAGNOSIS — I10 ESSENTIAL HYPERTENSION: ICD-10-CM

## 2020-12-22 DIAGNOSIS — R22.1 NECK MASS: ICD-10-CM

## 2020-12-22 DIAGNOSIS — E78.5 HYPERLIPIDEMIA, UNSPECIFIED HYPERLIPIDEMIA TYPE: ICD-10-CM

## 2020-12-22 DIAGNOSIS — Z87.891 FORMER SMOKER: ICD-10-CM

## 2020-12-22 DIAGNOSIS — Z12.11 SCREENING FOR COLON CANCER: ICD-10-CM

## 2020-12-22 DIAGNOSIS — Z12.31 ENCOUNTER FOR SCREENING MAMMOGRAM FOR MALIGNANT NEOPLASM OF BREAST: Primary | ICD-10-CM

## 2020-12-22 DIAGNOSIS — R22.0 SCALP LUMP: ICD-10-CM

## 2020-12-22 DIAGNOSIS — E55.9 VITAMIN D DEFICIENCY: ICD-10-CM

## 2020-12-22 DIAGNOSIS — R73.9 HYPERGLYCEMIA: ICD-10-CM

## 2020-12-22 LAB
25(OH)D3 SERPL-MCNC: 37.7 NG/ML (ref 30–100)
ALBUMIN SERPL-MCNC: 4.4 G/DL (ref 3.5–5.2)
ALBUMIN/GLOB SERPL: 1.6 G/DL
ALP SERPL-CCNC: 83 U/L (ref 39–117)
ALT SERPL W P-5'-P-CCNC: 30 U/L (ref 1–33)
ANION GAP SERPL CALCULATED.3IONS-SCNC: 13.2 MMOL/L (ref 5–15)
AST SERPL-CCNC: 20 U/L (ref 1–32)
BASOPHILS # BLD MANUAL: 0.12 10*3/MM3 (ref 0–0.2)
BASOPHILS NFR BLD AUTO: 1 % (ref 0–1.5)
BILIRUB SERPL-MCNC: 0.4 MG/DL (ref 0–1.2)
BUN SERPL-MCNC: 15 MG/DL (ref 8–23)
BUN/CREAT SERPL: 17 (ref 7–25)
CALCIUM SPEC-SCNC: 10 MG/DL (ref 8.6–10.5)
CHLORIDE SERPL-SCNC: 99 MMOL/L (ref 98–107)
CHOLEST SERPL-MCNC: 133 MG/DL (ref 0–200)
CO2 SERPL-SCNC: 23.8 MMOL/L (ref 22–29)
CREAT SERPL-MCNC: 0.88 MG/DL (ref 0.57–1)
DEPRECATED RDW RBC AUTO: 46.7 FL (ref 37–54)
ERYTHROCYTE [DISTWIDTH] IN BLOOD BY AUTOMATED COUNT: 13.1 % (ref 12.3–15.4)
GFR SERPL CREATININE-BSD FRML MDRD: 65 ML/MIN/1.73
GLOBULIN UR ELPH-MCNC: 2.7 GM/DL
GLUCOSE SERPL-MCNC: 114 MG/DL (ref 65–99)
HBA1C MFR BLD: 5.4 % (ref 3.5–5.6)
HCT VFR BLD AUTO: 39.3 % (ref 34–46.6)
HDLC SERPL-MCNC: 39 MG/DL (ref 40–60)
HGB BLD-MCNC: 13 G/DL (ref 12–15.9)
LDLC SERPL CALC-MCNC: 64 MG/DL (ref 0–100)
LDLC/HDLC SERPL: 1.49 {RATIO}
LYMPHOCYTES # BLD MANUAL: 2.32 10*3/MM3 (ref 0.7–3.1)
LYMPHOCYTES NFR BLD MANUAL: 18.8 % (ref 19.6–45.3)
LYMPHOCYTES NFR BLD MANUAL: 5.2 % (ref 5–12)
MAGNESIUM SERPL-MCNC: 1.9 MG/DL (ref 1.6–2.4)
MCH RBC QN AUTO: 32 PG (ref 26.6–33)
MCHC RBC AUTO-ENTMCNC: 33.1 G/DL (ref 31.5–35.7)
MCV RBC AUTO: 96.8 FL (ref 79–97)
MONOCYTES # BLD AUTO: 0.64 10*3/MM3 (ref 0.1–0.9)
NEUTROPHILS # BLD AUTO: 9.24 10*3/MM3 (ref 1.7–7)
NEUTROPHILS NFR BLD MANUAL: 75 % (ref 42.7–76)
PLAT MORPH BLD: NORMAL
PLATELET # BLD AUTO: 379 10*3/MM3 (ref 140–450)
PMV BLD AUTO: 11.1 FL (ref 6–12)
POTASSIUM SERPL-SCNC: 3.9 MMOL/L (ref 3.5–5.2)
PROT SERPL-MCNC: 7.1 G/DL (ref 6–8.5)
RBC # BLD AUTO: 4.06 10*6/MM3 (ref 3.77–5.28)
RBC MORPH BLD: NORMAL
SODIUM SERPL-SCNC: 136 MMOL/L (ref 136–145)
TRIGL SERPL-MCNC: 179 MG/DL (ref 0–150)
TSH SERPL DL<=0.05 MIU/L-ACNC: 1.44 UIU/ML (ref 0.27–4.2)
VIT B12 BLD-MCNC: 304 PG/ML (ref 211–946)
VLDLC SERPL-MCNC: 30 MG/DL (ref 5–40)
WBC # BLD AUTO: 12.32 10*3/MM3 (ref 3.4–10.8)
WBC MORPH BLD: NORMAL

## 2020-12-22 PROCEDURE — 80053 COMPREHEN METABOLIC PANEL: CPT | Performed by: PREVENTIVE MEDICINE

## 2020-12-22 PROCEDURE — 85007 BL SMEAR W/DIFF WBC COUNT: CPT | Performed by: PREVENTIVE MEDICINE

## 2020-12-22 PROCEDURE — 83735 ASSAY OF MAGNESIUM: CPT | Performed by: PREVENTIVE MEDICINE

## 2020-12-22 PROCEDURE — 36415 COLL VENOUS BLD VENIPUNCTURE: CPT | Performed by: PREVENTIVE MEDICINE

## 2020-12-22 PROCEDURE — 82607 VITAMIN B-12: CPT | Performed by: PREVENTIVE MEDICINE

## 2020-12-22 PROCEDURE — 80061 LIPID PANEL: CPT | Performed by: PREVENTIVE MEDICINE

## 2020-12-22 PROCEDURE — 82306 VITAMIN D 25 HYDROXY: CPT | Performed by: PREVENTIVE MEDICINE

## 2020-12-22 PROCEDURE — 85025 COMPLETE CBC W/AUTO DIFF WBC: CPT | Performed by: PREVENTIVE MEDICINE

## 2020-12-22 PROCEDURE — 84443 ASSAY THYROID STIM HORMONE: CPT | Performed by: PREVENTIVE MEDICINE

## 2020-12-22 PROCEDURE — 83036 HEMOGLOBIN GLYCOSYLATED A1C: CPT | Performed by: PREVENTIVE MEDICINE

## 2020-12-22 PROCEDURE — 99214 OFFICE O/P EST MOD 30 MIN: CPT | Performed by: PREVENTIVE MEDICINE

## 2020-12-22 NOTE — PROGRESS NOTES
Chief Complaint   Patient presents with   • Hypertension     6 month follow up       Patient presents for follow-up on chronic medical problems including hypertension, hyperlipidemia, depression and chronic back pain. Patient denies adverse effects of medications, headache, dizziness, chest pain, palpitations, shortness of breath, cough, nausea, vomiting, abdominal pain, unexplained change in weight, hair loss and fatigue. Patient complains of muscle aches and joint pain. Patient is here for monitoring of chronic issues due to need for monitoring of renal function, liver function, blood pressure effects of meds, adverse effects, side effects and interactions    Past Medical History:   Diagnosis Date   • Arthritis    • Depression    • Osteoporosis    • Spinal stenosis 5/9/2017     Past Surgical History:   Procedure Laterality Date   • ANTERIOR CERVICAL DISCECTOMY      C3-C4/Ant. plating C3-C6 for non-union-abstracted from [facility]   • APPENDECTOMY     • BACK SURGERY  2017    Interior and posterior-Abstracted from [facility]   • CERVICAL CORPECTOMY      C5   • MEDIASTINOTOMY FOR EXPLORATION / DRAINAGE / BIOPSY / REMOVAL FB W/ CERVICAL APPROACH  09/2011    Removal of cervical plate-Abstracted from [facility]   • VAGINAL HYSTERECTOMY       Family History   Problem Relation Age of Onset   • Arthritis Mother    • Arthritis Father    • Lung cancer Father    • Other Father         Other cancer   • Arthritis Maternal Grandmother    • Arthritis Maternal Grandfather    • Lung cancer Other      Social History     Tobacco Use   • Smoking status: Former Smoker     Packs/day: 0.50     Types: Cigarettes     Quit date: 2017     Years since quitting: 3.9   • Smokeless tobacco: Never Used   Substance Use Topics   • Alcohol use: No     Frequency: Never       PHQ-2 Depression Screening     PHQ-9 Depression Screening       Review of Systems   Constitutional: Negative.    HENT: Negative.  Negative for sinus pressure and sore throat.    Eyes: Negative.   "  Respiratory: Negative.  Negative for cough.    Cardiovascular: Negative.    Gastrointestinal: Negative.    Endocrine: Negative.    Genitourinary: Negative.    Musculoskeletal: Negative.    Skin: Positive for skin lesions.   Allergic/Immunologic: Positive for environmental allergies.   Neurological: Negative.    Hematological: Positive for adenopathy.   Psychiatric/Behavioral: Negative.      Vitals:    12/22/20 1005 12/22/20 1006 12/22/20 1009   BP: 112/58 121/67 115/63   BP Location: Left arm Right arm Right arm   Patient Position: Sitting Sitting Sitting   Cuff Size: Adult Adult Adult   Pulse: 99     Temp: 98.4 °F (36.9 °C)     SpO2: 96%     Weight: 103 kg (227 lb 12.8 oz)     Height: 162.6 cm (64.02\")       Body mass index is 39.08 kg/m².  Physical Exam  Vitals signs reviewed.   Constitutional:       General: She is not in acute distress.     Appearance: She is well-developed. She is obese. She is not ill-appearing or toxic-appearing.   HENT:      Head: Normocephalic and atraumatic.      Right Ear: Tympanic membrane, ear canal and external ear normal.      Left Ear: Tympanic membrane, ear canal and external ear normal.      Nose: Nose normal.   Eyes:      Extraocular Movements: Extraocular movements intact.      Conjunctiva/sclera: Conjunctivae normal.      Pupils: Pupils are equal, round, and reactive to light.   Neck:      Musculoskeletal: Normal range of motion and neck supple.      Comments: 2cm fullness suprsternal notch moveable.  No other adenopathy  Cardiovascular:      Rate and Rhythm: Normal rate and regular rhythm.      Heart sounds: Normal heart sounds.   Pulmonary:      Effort: Pulmonary effort is normal.      Comments: Decreased breath sound shruti;lsbreath    Abdominal:      General: Bowel sounds are normal. There is no distension.      Palpations: Abdomen is soft. There is no mass.      Tenderness: There is no abdominal tenderness.   Musculoskeletal: Normal range of motion.         General: " Tenderness and deformity present.      Comments: Low back midline surgical site well healed   Skin:     General: Skin is warm.      Comments: Two 3mm elevated red scaley patches scalp   Neurological:      General: No focal deficit present.      Mental Status: She is alert and oriented to person, place, and time.   Psychiatric:         Mood and Affect: Mood normal.         Behavior: Behavior normal.       Hemoglobin A1C (%)   Date Value   06/23/2020 5.2     @RESUFAST(CHOL,chlpl,TRIG,HDL,LDLCALC,LDL,ldldirect)  Creatinine   Date Value   06/23/2020 0.93 mg/dL   07/12/2019 1.00 mg/dL   01/14/2019 1.0 mg/dl   08/30/2018 1.0 mg/dl   07/17/2018 1.0 mg/dl     No results found for: MICROALBUR, [id number]      Diagnoses and all orders for this visit:    1. Encounter for screening mammogram for malignant neoplasm of breast (Primary)  Comments:  mammogram, No masses BSE  Orders:  -     Mammo Screening Digital Tomosynthesis Bilateral With CAD; Future    2. Screening for colon cancer  Comments:  cologuaagata    3. Encounter for screening for lung cancer  Comments:  cT low dose placed  Orders:  -     CT Chest Low Dose Wo; Future    4. Former smoker  Comments:  Quit 2017  Orders:  -     CT Chest Low Dose Wo; Future    5. Hyperglycemia  Comments:  Trying to watch carbs  Orders:  -     Comprehensive Metabolic Panel  -     Hemoglobin A1c    6. Hyperlipidemia, unspecified hyperlipidemia type  Comments:  Trying to eat less sat fats  Orders:  -     Lipid Panel    7. Essential hypertension  Comments:  Controlled  Orders:  -     CBC Auto Differential    8. Vitamin D deficiency  Comments:  Taking daily  Orders:  -     Vitamin D 25 Hydroxy    9. Depression, unspecified depression type  Comments:  Controlled  Orders:  -     Magnesium  -     TSH  -     Vitamin B12    10. Scalp lump  Comments:  derm  Orders:  -     Ambulatory Referral to Dermatology    11. Neck mass  Comments:  CT.  Noticed over last few months-feels may just be fat and is below  thyroid.  No other adenopathy  Orders:  -     CT Soft Tissue Neck Without Contrast; Future    Other orders  -     Cancel: Cologuard - Stool, Per Rectum; Future

## 2020-12-24 NOTE — PROGRESS NOTES
Glucose 114 but A1C shows no present increased risk for DM at 5.4.  B12 is low so might start or increase otc dose.  WBC count is elevating as has done in past.  If signs of infection to include thinks like fever, sore throat abdominal pain cough, diarrhea we should recheck

## 2020-12-27 ENCOUNTER — TELEPHONE (OUTPATIENT)
Dept: FAMILY MEDICINE CLINIC | Facility: CLINIC | Age: 65
End: 2020-12-27

## 2020-12-27 RX ORDER — LISINOPRIL AND HYDROCHLOROTHIAZIDE 25; 20 MG/1; MG/1
TABLET ORAL
Qty: 90 TABLET | Refills: 0 | Status: SHIPPED | OUTPATIENT
Start: 2020-12-27 | End: 2021-05-06

## 2020-12-27 NOTE — TELEPHONE ENCOUNTER
HUB TO READ:    ALSO SENT THROUGH MY CHART    Glucose 114 but A1C shows no present increased risk for DM at 5.4.  B12 is low so might start or increase otc dose.  WBC count is elevating as has done in past.  If signs of infection to include thinks like fever, sore throat abdominal pain cough, diarrhea we should recheck

## 2021-01-07 ENCOUNTER — OFFICE VISIT (OUTPATIENT)
Dept: PAIN MEDICINE | Facility: CLINIC | Age: 66
End: 2021-01-07

## 2021-01-07 VITALS — RESPIRATION RATE: 16 BRPM | HEIGHT: 64 IN | WEIGHT: 227 LBS | BODY MASS INDEX: 38.76 KG/M2

## 2021-01-07 DIAGNOSIS — G89.4 CHRONIC PAIN SYNDROME: Primary | ICD-10-CM

## 2021-01-07 DIAGNOSIS — M47.817 LUMBOSACRAL SPONDYLOSIS WITHOUT MYELOPATHY: ICD-10-CM

## 2021-01-07 DIAGNOSIS — M25.50 POLYARTHRALGIA: ICD-10-CM

## 2021-01-07 DIAGNOSIS — Z79.899 HIGH RISK MEDICATION USE: ICD-10-CM

## 2021-01-07 DIAGNOSIS — M46.1 SACROILIITIS (HCC): ICD-10-CM

## 2021-01-07 PROCEDURE — 99443 PR PHYS/QHP TELEPHONE EVALUATION 21-30 MIN: CPT | Performed by: ANESTHESIOLOGY

## 2021-01-07 RX ORDER — HYDROCODONE BITARTRATE AND ACETAMINOPHEN 7.5; 325 MG/1; MG/1
1 TABLET ORAL 3 TIMES DAILY PRN
Qty: 90 TABLET | Refills: 0 | Status: SHIPPED | OUTPATIENT
Start: 2021-01-12 | End: 2021-03-04 | Stop reason: SDUPTHER

## 2021-01-07 RX ORDER — HYDROCODONE BITARTRATE AND ACETAMINOPHEN 7.5; 325 MG/1; MG/1
1 TABLET ORAL 3 TIMES DAILY PRN
Qty: 90 TABLET | Refills: 0 | Status: SHIPPED | OUTPATIENT
Start: 2021-02-11 | End: 2021-03-04 | Stop reason: SDUPTHER

## 2021-01-07 NOTE — PROGRESS NOTES
CC  hip/buttock, right leg, lower back pain  65-year-old female with chronic back pain here for follow-up by tel.     Continued chronic right>left hip/right gluteal pain denies weakness or injury.     Chronic back pain radiating to bilateral lower extremity worse on the left with aching numbness in both legs worse with standing walking or activity.  Denies new weakness saddle anesthesia bladder bowel incontinence.    Pain interfering with daily activity and sleep.  Marginal relief with physical therapy or anti-inflammatories.      Utilizes hydrocodone with good relief functional benefit and side effects.    Marginal relief with caudal JESSICA in the past.  Declines LESI due to past experience with post dural puncture headache.    L-spine MRI 2016: Multilevel degenerative disc disease and degenerative facet change as well as  multilevel postoperative changes detailed above.  There is a 14 mm of anterolisthesis of L4 on L5 which has worsened from the prior exam where this was only 4 mm.  This results in severe bilateral neural foraminal narrowing but no spinal canal stenosis.    Pain Assessment   Cause of Pain: surgery  Location of Pain: Lower Back, R Hip, L Hip, R Leg, L Leg  Description of Pain: Dull/Aching, Throbbing, Stabbing  Previous Pain Rating : 7  Current Pain Ratin  Aggravating Factors: Activity  Alleviating Factors: Rest, Medication  Previous Treatments: Epidural Steroids, Narcotic Pain Medication, Physical Therapy  Previous Diagnostic Studies: MRI   PEG Assessment   What number best describes your pain on average in the past week?5  What number best describes how, during the past week, pain has interfered with your enjoyment of life?8  What number best describes how, during the past week, pain has interfered with your general activity? 10     has a past medical history of Arthritis, Depression, Osteoporosis, and Spinal stenosis (2017).     has a past surgical history that includes Cervical Curettage;  Anterior cervical discectomy; Total vaginal hysterectomy; Appendectomy; Back surgery (2017); and Mediastinotomy for exploration / drainage / biopsy / removal Fb w/ cervical approach (2011).     Social History     Tobacco Use   • Smoking status: Former Smoker     Packs/day: 0.50     Types: Cigarettes     Quit date:      Years since quittin.0   • Smokeless tobacco: Never Used   Substance Use Topics   • Alcohol use: No     Frequency: Never     Review of Systems        See HPI    General       Denies fever/chills, fatigue and sleep problems.    Eyes       Denies blurry vision and double vision.    ENT       Denies decreased hearing, sore throat and ears ringing.    CV       Denies chest pain and fainting.    Resp       Denies shortness of breath and cough.    GI       Denies heartburn, constipation, nausea, vomiting and diarrhea.           Denies pain on urination, incontinence and increased frequency.    MS       low back pain bilateral upper extremity    Derm       Denies rash and itching.    Neuro       Denies numbness, tingling, loss of balance and history of seizures.    Psych       Denies anxiety and depression.    Endo       Denies weight change and thirsty all the time.    Heme       Denies easy bruising, bleeding and enlarged lymph nodes.  Vitals:    21 1031   Resp: 16     Physical Exam   NAD.      PHQ 9 on chart                     opioid risk tool low risk      Assessment /Plan     Diagnoses and all orders for this visit:    1. Chronic pain syndrome (Primary)  -     HYDROcodone-acetaminophen (NORCO) 7.5-325 MG per tablet; Take 1 tablet by mouth 3 (Three) Times a Day As Needed for Severe Pain .  Dispense: 90 tablet; Refill: 0  -     HYDROcodone-acetaminophen (NORCO) 7.5-325 MG per tablet; Take 1 tablet by mouth 3 (Three) Times a Day As Needed for Severe Pain . DNF before 2021  Dispense: 90 tablet; Refill: 0    2. Lumbosacral spondylosis without myelopathy    3. Polyarthralgia    4.  Sacroiliitis (CMS/HCC)    5. High risk medication use      Summary   65-year-old female with chronic back pain here for follow-up.    chronic pain from lumbar DDD spondylosis with radicular pain.  Chronic polyarthralgia.  Sacroiliitis, repeat SI injection as needed    Continue hydrocodone 7.5/25 3 times daily.  UDS and inspect reviewed.  Discussed risk of tolerance, dependence, respiratory depression, coma and death associated with use of oral opioids for treatment of chronic nonmalignant pain.      Continue gabapentin/flexeril.  Cont. topical compounded neuropathic/anti-inflammatory cream with ketamine.    Telemedicine done to avoid coronavirus exposure  A phone visit was used to complete visit. Total time of discussion was 21 minutes      RTC 2 mo

## 2021-01-14 NOTE — PATIENT INSTRUCTIONS
Health Maintenance Due   Topic Date Due   • TDAP/TD VACCINES (1 - Tdap) 01/20/1976   • ZOSTER VACCINE (1 of 2) 01/20/2007   • LUNG CANCER SCREENING  01/20/2012   • MAMMOGRAM  06/24/2020   • COLOGUARD  07/14/2020

## 2021-01-15 ENCOUNTER — OFFICE VISIT (OUTPATIENT)
Dept: FAMILY MEDICINE CLINIC | Facility: CLINIC | Age: 66
End: 2021-01-15

## 2021-01-15 VITALS
HEIGHT: 64 IN | WEIGHT: 231.2 LBS | BODY MASS INDEX: 39.47 KG/M2 | HEART RATE: 98 BPM | DIASTOLIC BLOOD PRESSURE: 79 MMHG | TEMPERATURE: 97.5 F | SYSTOLIC BLOOD PRESSURE: 142 MMHG | OXYGEN SATURATION: 95 %

## 2021-01-15 DIAGNOSIS — R22.0 MANDIBULAR SWELLING: Primary | ICD-10-CM

## 2021-01-15 DIAGNOSIS — I10 ESSENTIAL HYPERTENSION: ICD-10-CM

## 2021-01-15 DIAGNOSIS — K13.0 ANGULAR CHEILITIS: ICD-10-CM

## 2021-01-15 DIAGNOSIS — Z87.891 FORMER SMOKER: ICD-10-CM

## 2021-01-15 PROCEDURE — 99213 OFFICE O/P EST LOW 20 MIN: CPT | Performed by: PREVENTIVE MEDICINE

## 2021-01-15 RX ORDER — AZITHROMYCIN 250 MG/1
TABLET, FILM COATED ORAL
Qty: 6 TABLET | Refills: 0 | Status: SHIPPED | OUTPATIENT
Start: 2021-01-15 | End: 2021-06-03

## 2021-01-15 NOTE — PROGRESS NOTES
"Subjective   Sherrie Chand is a 65 y.o. female presents for   Chief Complaint   Patient presents with   • Facial Swelling     right jaw and neck       Health Maintenance Due   Topic Date Due   • TDAP/TD VACCINES (1 - Tdap) 01/20/1976   • ZOSTER VACCINE (1 of 2) 01/20/2007   • LUNG CANCER SCREENING  01/20/2012   • MAMMOGRAM  06/24/2020   • COLOGUARD  07/14/2020       R jaw and submandibular selling for a week or two..  Teeth pulled a few months ago and dentures fit well.   Some R ear pain.  No trouble swallowing or chewing.  Angles of mouth are dry-dentist feels due to diuretic  So waxing or waning of swelling with Saliva production.  No fever. But nodules are tender.  No cough or sore throat.       Vitals:    01/15/21 1155 01/15/21 1158 01/15/21 1159   BP: 105/70 121/73 142/79   BP Location: Right arm Right arm Left arm   Patient Position: Sitting Sitting Standing   Cuff Size: Adult  Adult   Pulse: 98     Temp: 97.5 °F (36.4 °C)     SpO2: 95%     Weight: 105 kg (231 lb 3.2 oz)     Height: 162.6 cm (64.02\")       Body mass index is 39.67 kg/m².    Current Outpatient Medications on File Prior to Visit   Medication Sig Dispense Refill   • atorvastatin (LIPITOR) 40 MG tablet TAKE 1 TABLET EVERY NIGHT 90 tablet 3   • Calcium Carbonate-Vitamin D 600-400 MG-UNIT chewable tablet Chew 1 tablet Daily.     • cyclobenzaprine (FLEXERIL) 10 MG tablet Take 1 tablet by mouth 3 (Three) Times a Day As Needed for Muscle Spasms. 90 tablet 11   • DULoxetine (CYMBALTA) 60 MG capsule TAKE 1 CAPSULE EVERY DAY 90 capsule 0   • gabapentin (NEURONTIN) 800 MG tablet Take 1 tablet by mouth 3 (Three) Times a Day. 90 tablet 11   • HYDROcodone-acetaminophen (NORCO) 7.5-325 MG per tablet Take 1 tablet by mouth 3 (Three) Times a Day As Needed for Severe Pain . 90 tablet 0   • [START ON 2/11/2021] HYDROcodone-acetaminophen (NORCO) 7.5-325 MG per tablet Take 1 tablet by mouth 3 (Three) Times a Day As Needed for Severe Pain . DNF before 2/11/2021 90 " tablet 0   • lisinopril-hydrochlorothiazide (PRINZIDE,ZESTORETIC) 20-25 MG per tablet TAKE 1 TABLET EVERY DAY 90 tablet 0   • St Johns Wort 150 MG capsule Take 1 capsule by mouth Daily.     • sulindac (CLINORIL) 200 MG tablet TAKE 1 TABLET EVERY DAY 90 tablet 3   • vitamin B-6 (PYRIDOXINE) 100 MG tablet Take 300 mg by mouth Daily.       No current facility-administered medications on file prior to visit.        The following portions of the patient's history were reviewed and updated as appropriate: allergies, current medications, past family history, past medical history, past social history, past surgical history and problem list.    Review of Systems   HENT: Positive for facial swelling and swollen glands. Negative for sore throat.         No axillary groin or epitrochlear or suboccipital adenopathy   Skin:        shruti corners mouth dry and split       Objective   Physical Exam  HENT:      Right Ear: Tympanic membrane, ear canal and external ear normal.      Mouth/Throat:      Mouth: Mucous membranes are moist.      Pharynx: Oropharynx is clear. No oropharyngeal exudate or posterior oropharyngeal erythema.      Comments: Stensons and Warton's duct not red or swollen.  Tenderness and mass 3 mm R angle of mandible  Neck:      Musculoskeletal: Muscular tenderness present.      Vascular: No carotid bruit.   Cardiovascular:      Rate and Rhythm: Tachycardia present.      Heart sounds: Normal heart sounds.   Pulmonary:      Effort: Pulmonary effort is normal.      Comments: Decreased breath sound shruti    Lymphadenopathy:      Cervical: Cervical adenopathy present.       PHQ-9 Total Score:      Assessment/Plan   Diagnoses and all orders for this visit:    1. Mandibular swelling (Primary)  Comments:  sharp nodule over angle or R mandible and R>L ant cervical and submandibular adenopathy/trial Azithromycin and CT and ENT if persist  Orders:  -     azithromycin (Zithromax Z-Amish) 250 MG tablet; Take 2 tablets the first day,  then 1 tablet daily for 4 days.  Dispense: 6 tablet; Refill: 0  -     CBC Auto Differential; Future    2. Essential hypertension  Comments:  Controlled    3. Former smoker  Comments:  Continue cessation    4. Angular cheilitis  Comments:  Denture protection cream dabbed on mouth creases    Other orders  -     Cancel: Cologuard - Stool, Per Rectum; Future  -     Cancel: Mammo Screening Digital Tomosynthesis Bilateral With CAD; Future  -     Cancel: CT Chest Low Dose Wo Cancer Screening; Future        Patient Instructions     Health Maintenance Due   Topic Date Due   • TDAP/TD VACCINES (1 - Tdap) 01/20/1976   • ZOSTER VACCINE (1 of 2) 01/20/2007   • LUNG CANCER SCREENING  01/20/2012   • MAMMOGRAM  06/24/2020   • COLOGUARD  07/14/2020

## 2021-01-27 ENCOUNTER — CLINICAL SUPPORT (OUTPATIENT)
Dept: FAMILY MEDICINE CLINIC | Facility: CLINIC | Age: 66
End: 2021-01-27

## 2021-01-27 DIAGNOSIS — R73.9 HYPERGLYCEMIA: ICD-10-CM

## 2021-01-27 DIAGNOSIS — R22.0 MANDIBULAR SWELLING: ICD-10-CM

## 2021-01-27 DIAGNOSIS — I10 ESSENTIAL HYPERTENSION: ICD-10-CM

## 2021-01-27 LAB
BASOPHILS # BLD AUTO: 0.04 10*3/MM3 (ref 0–0.2)
BASOPHILS NFR BLD AUTO: 0.3 % (ref 0–1.5)
DEPRECATED RDW RBC AUTO: 45.3 FL (ref 37–54)
EOSINOPHIL # BLD AUTO: 0.1 10*3/MM3 (ref 0–0.4)
EOSINOPHIL NFR BLD AUTO: 0.7 % (ref 0.3–6.2)
ERYTHROCYTE [DISTWIDTH] IN BLOOD BY AUTOMATED COUNT: 13 % (ref 12.3–15.4)
HCT VFR BLD AUTO: 38.5 % (ref 34–46.6)
HGB BLD-MCNC: 13 G/DL (ref 12–15.9)
IMM GRANULOCYTES # BLD AUTO: 0.1 10*3/MM3 (ref 0–0.05)
IMM GRANULOCYTES NFR BLD AUTO: 0.7 % (ref 0–0.5)
LYMPHOCYTES # BLD AUTO: 2.2 10*3/MM3 (ref 0.7–3.1)
LYMPHOCYTES NFR BLD AUTO: 16.1 % (ref 19.6–45.3)
MCH RBC QN AUTO: 32.4 PG (ref 26.6–33)
MCHC RBC AUTO-ENTMCNC: 33.8 G/DL (ref 31.5–35.7)
MCV RBC AUTO: 96 FL (ref 79–97)
MONOCYTES # BLD AUTO: 0.92 10*3/MM3 (ref 0.1–0.9)
MONOCYTES NFR BLD AUTO: 6.7 % (ref 5–12)
NEUTROPHILS NFR BLD AUTO: 10.3 10*3/MM3 (ref 1.7–7)
NEUTROPHILS NFR BLD AUTO: 75.5 % (ref 42.7–76)
NRBC BLD AUTO-RTO: 0 /100 WBC (ref 0–0.2)
PLATELET # BLD AUTO: 382 10*3/MM3 (ref 140–450)
PMV BLD AUTO: 10.6 FL (ref 6–12)
RBC # BLD AUTO: 4.01 10*6/MM3 (ref 3.77–5.28)
WBC # BLD AUTO: 13.66 10*3/MM3 (ref 3.4–10.8)

## 2021-01-27 PROCEDURE — 36415 COLL VENOUS BLD VENIPUNCTURE: CPT | Performed by: PREVENTIVE MEDICINE

## 2021-01-27 PROCEDURE — 85025 COMPLETE CBC W/AUTO DIFF WBC: CPT | Performed by: PREVENTIVE MEDICINE

## 2021-01-27 NOTE — PROGRESS NOTES
Venipuncture Blood Specimen Collection  Venipuncture performed in left arm by Ban Alexander MD with good hemostasis. Patient tolerated the procedure well without complications.   01/27/21   MD Ban Brooks MD

## 2021-01-28 ENCOUNTER — TELEPHONE (OUTPATIENT)
Dept: FAMILY MEDICINE CLINIC | Facility: CLINIC | Age: 66
End: 2021-01-28

## 2021-01-28 DIAGNOSIS — R59.9 ADENOPATHY: Primary | ICD-10-CM

## 2021-01-28 NOTE — TELEPHONE ENCOUNTER
HUB TO READ  White blood cell count is still elevated.  Any fever?  Has Knot R jaw changed?  Is it tender?  Would advise followup with ENT since has had antibiotics and WBC is still elevated.  Call if questions. Referral placed

## 2021-01-28 NOTE — PROGRESS NOTES
White blood cell count is still elevated.  Any fever?  Has Knot R jaw changed?  Is it tender?  Would advise followup with ENT since has had antibiotics and WBC is still elevated.  Call if questions. Referral placed

## 2021-03-02 RX ORDER — CYCLOBENZAPRINE HCL 10 MG
10 TABLET ORAL 3 TIMES DAILY PRN
Qty: 90 TABLET | Refills: 3 | OUTPATIENT
Start: 2021-03-02

## 2021-03-04 ENCOUNTER — TELEPHONE (OUTPATIENT)
Dept: PAIN MEDICINE | Facility: CLINIC | Age: 66
End: 2021-03-04

## 2021-03-04 ENCOUNTER — OFFICE VISIT (OUTPATIENT)
Dept: PAIN MEDICINE | Facility: CLINIC | Age: 66
End: 2021-03-04

## 2021-03-04 VITALS — WEIGHT: 231 LBS | BODY MASS INDEX: 39.44 KG/M2 | HEIGHT: 64 IN | RESPIRATION RATE: 16 BRPM

## 2021-03-04 DIAGNOSIS — Z79.899 HIGH RISK MEDICATION USE: ICD-10-CM

## 2021-03-04 DIAGNOSIS — M47.817 LUMBOSACRAL SPONDYLOSIS WITHOUT MYELOPATHY: ICD-10-CM

## 2021-03-04 DIAGNOSIS — G89.4 CHRONIC PAIN SYNDROME: Primary | ICD-10-CM

## 2021-03-04 DIAGNOSIS — M54.16 LUMBAR RADICULITIS: ICD-10-CM

## 2021-03-04 DIAGNOSIS — M25.50 POLYARTHRALGIA: ICD-10-CM

## 2021-03-04 PROCEDURE — 99443 PR PHYS/QHP TELEPHONE EVALUATION 21-30 MIN: CPT | Performed by: ANESTHESIOLOGY

## 2021-03-04 RX ORDER — HYDROCODONE BITARTRATE AND ACETAMINOPHEN 7.5; 325 MG/1; MG/1
1 TABLET ORAL 3 TIMES DAILY PRN
Qty: 90 TABLET | Refills: 0 | Status: SHIPPED | OUTPATIENT
Start: 2021-04-11 | End: 2021-04-29 | Stop reason: SDUPTHER

## 2021-03-04 RX ORDER — HYDROCODONE BITARTRATE AND ACETAMINOPHEN 7.5; 325 MG/1; MG/1
1 TABLET ORAL 3 TIMES DAILY PRN
Qty: 90 TABLET | Refills: 0 | Status: SHIPPED | OUTPATIENT
Start: 2021-03-12 | End: 2021-04-29 | Stop reason: SDUPTHER

## 2021-03-04 RX ORDER — DULOXETIN HYDROCHLORIDE 60 MG/1
60 CAPSULE, DELAYED RELEASE ORAL DAILY
Qty: 90 CAPSULE | Refills: 1 | Status: SHIPPED | OUTPATIENT
Start: 2021-03-04 | End: 2021-07-08

## 2021-03-04 NOTE — PROGRESS NOTES
CC  hip/buttock, right leg, lower back pain  66-year-old female with chronic back pain here for follow-up by tel.     Worsening RLE radicular pain. Interferes with ADL.      Chronic back pain radiating to bilateral lower extremity worse on the left with aching numbness in both legs worse with standing walking or activity.  Denies new weakness saddle anesthesia bladder bowel incontinence.    Pain interfering with daily activity and sleep.  Marginal relief with physical therapy or anti-inflammatories.      Utilizes hydrocodone with good relief functional benefit and side effects.    Marginal relief with caudal JESSICA in the past.  Declines LESI due to past experience with post dural puncture headache.    L-spine MRI 2016: Multilevel degenerative disc disease and degenerative facet change as well as  multilevel postoperative changes detailed above.  There is a 14 mm of anterolisthesis of L4 on L5 which has worsened from the prior exam where this was only 4 mm.  This results in severe bilateral neural foraminal narrowing but no spinal canal stenosis.    Pain Assessment   Cause of Pain: surgery  Location of Pain: Lower Back, R Hip, L Hip, R Leg, L Leg  Description of Pain: Dull/Aching, Throbbing, Stabbing  Previous Pain Rating : 7  Current Pain Ratin  Aggravating Factors: Activity  Alleviating Factors: Rest, Medication  Previous Treatments: Epidural Steroids, Narcotic Pain Medication, Physical Therapy  Previous Diagnostic Studies: MRI   PEG Assessment   What number best describes your pain on average in the past week?5  What number best describes how, during the past week, pain has interfered with your enjoyment of life?8  What number best describes how, during the past week, pain has interfered with your general activity? 10     has a past medical history of Arthritis, Depression, Osteoporosis, and Spinal stenosis (2017).     has a past surgical history that includes Cervical Curettage; Anterior cervical  discectomy; Total vaginal hysterectomy; Appendectomy; Back surgery (2017); and Mediastinotomy for exploration / drainage / biopsy / removal Fb w/ cervical approach (2011).     Social History     Tobacco Use   • Smoking status: Former Smoker     Packs/day: 0.50     Types: Cigarettes     Quit date:      Years since quittin.1   • Smokeless tobacco: Never Used   Substance Use Topics   • Alcohol use: No     Frequency: Never     Review of Systems        See HPI      Vitals:    21 0940   Resp: 16     Physical Exam   NAD.      PHQ 9 on chart                     opioid risk tool low risk      Assessment /Plan     Diagnoses and all orders for this visit:    1. Chronic pain syndrome (Primary)  -     HYDROcodone-acetaminophen (NORCO) 7.5-325 MG per tablet; Take 1 tablet by mouth 3 (Three) Times a Day As Needed for Severe Pain . DNF before 2021  Dispense: 90 tablet; Refill: 0  -     HYDROcodone-acetaminophen (NORCO) 7.5-325 MG per tablet; Take 1 tablet by mouth 3 (Three) Times a Day As Needed for Severe Pain .  Dispense: 90 tablet; Refill: 0    2. Lumbosacral spondylosis without myelopathy    3. Lumbar radiculitis    4. Polyarthralgia    5. High risk medication use      Summary   66-year-old female with chronic back pain here for follow-up.    chronic pain from lumbar DDD spondylosis with radicular pain.  Chronic polyarthralgia.  Sacroiliitis, repeat SI injection as needed    Worsening RLE radicular pain. Anterolisthesis and foraminal narrowing at L4-L5.   Declines ILESI due to hx of PDPH. Recommended she tried right L4, L5 TFESI. She will consider    Continue hydrocodone 7.5/25 3 times daily.  UDS and inspect reviewed.  Discussed risk of tolerance, dependence, respiratory depression, coma and death associated with use of oral opioids for treatment of chronic nonmalignant pain.      Continue gabapentin/flexeril.  Cont. topical compounded neuropathic/anti-inflammatory cream with ketamine.    Telemedicine  done to avoid coronavirus exposure  A phone visit was used to complete visit. Total time of discussion was 23 minutes      RTC 2 mo

## 2021-03-08 ENCOUNTER — TELEPHONE (OUTPATIENT)
Dept: FAMILY MEDICINE CLINIC | Facility: CLINIC | Age: 66
End: 2021-03-08

## 2021-03-08 DIAGNOSIS — R59.1 LYMPHADENOPATHY: Primary | ICD-10-CM

## 2021-03-08 NOTE — TELEPHONE ENCOUNTER
[facility] call[facility]. Patient in scheduled for CT Neck WITH tomorrow. The order needs to say WITH, not Without. That are asking for new order.    Also, the Authorization was only good until January 21st. A New Authorization is needed. For CT Neck With- If /when new order put in.

## 2021-03-09 ENCOUNTER — TELEPHONE (OUTPATIENT)
Dept: FAMILY MEDICINE CLINIC | Facility: CLINIC | Age: 66
End: 2021-03-09

## 2021-03-09 DIAGNOSIS — E04.2 MULTIPLE THYROID NODULES: Primary | ICD-10-CM

## 2021-03-09 DIAGNOSIS — R22.1 NECK MASS: ICD-10-CM

## 2021-03-09 PROBLEM — R91.1 LUNG NODULE: Status: ACTIVE | Noted: 2021-03-09

## 2021-03-09 PROBLEM — J47.9: Status: ACTIVE | Noted: 2021-03-09

## 2021-03-09 NOTE — TELEPHONE ENCOUNTER
HUB TO READ  Thyroid nodules are present and advise we get Ultrasound with followup Dr. Mcmanus-hannah placed    Lung cancer screening shows R nodule that will need repeat CT in 6-12 months and L broncheal tube shows irregularity that may be polyp.  Will refer to pulmonology to see what they think and if L main stem bronchus needs to be checked.  Also R renal cyst-can refer to urologist if she has not been told that she had before-I don't see in out records-let me know

## 2021-03-09 NOTE — TELEPHONE ENCOUNTER
HUB TO READ  Lung cancer screening shows R nodule that will need repeat CT in 6-12 months and L broncheal tube shows irregularity that may be polyp.  Will refer to pulmonology to see what they think and if L main stem bronchus needs to be checked.  Also R renal cyst-can refer to urologist if she has not been told that she had before-I don't see in out records-let me know

## 2021-03-10 ENCOUNTER — TELEPHONE (OUTPATIENT)
Dept: FAMILY MEDICINE CLINIC | Facility: CLINIC | Age: 66
End: 2021-03-10

## 2021-03-10 NOTE — TELEPHONE ENCOUNTER
----- Message from Sherrie Chand sent at 3/10/2021 11:16 AM EST -----  Regarding: Test Results Question  Contact: [phone]  Good morning - I just have a question for the nurse concerning CT results. Could you please call me at your convenience at [phone].     Thanks - Sherrie Chand

## 2021-03-11 ENCOUNTER — TELEPHONE (OUTPATIENT)
Dept: FAMILY MEDICINE CLINIC | Facility: CLINIC | Age: 66
End: 2021-03-11

## 2021-03-11 NOTE — TELEPHONE ENCOUNTER
----- Message from Sherrie Chand sent at 3/11/2021  1:49 PM EST -----  Regarding: Test Results Question  Contact: [phone]  Amelia - this is Sherrie Chand again and I was wondering if Dr Alexander has had time to see my CT  results yet, especially CT of neck. You can reach me at [phone]. Thanks so much!!!

## 2021-03-24 ENCOUNTER — TELEPHONE (OUTPATIENT)
Dept: FAMILY MEDICINE CLINIC | Facility: CLINIC | Age: 66
End: 2021-03-24

## 2021-03-24 NOTE — TELEPHONE ENCOUNTER
HUB TO RAED  US shows multiple thyroid nodules but not meet criteria for biopsy-should repeat4 one year.

## 2021-03-25 DIAGNOSIS — J47.9: ICD-10-CM

## 2021-03-25 DIAGNOSIS — R91.1 LUNG NODULE: Primary | ICD-10-CM

## 2021-04-12 RX ORDER — CYCLOBENZAPRINE HCL 10 MG
10 TABLET ORAL 3 TIMES DAILY PRN
Qty: 90 TABLET | Refills: 11 | Status: SHIPPED | OUTPATIENT
Start: 2021-04-12 | End: 2022-01-13

## 2021-04-29 ENCOUNTER — OFFICE VISIT (OUTPATIENT)
Dept: PAIN MEDICINE | Facility: CLINIC | Age: 66
End: 2021-04-29

## 2021-04-29 VITALS — WEIGHT: 231 LBS | BODY MASS INDEX: 39.44 KG/M2 | HEIGHT: 64 IN

## 2021-04-29 DIAGNOSIS — M54.16 LUMBAR RADICULITIS: ICD-10-CM

## 2021-04-29 DIAGNOSIS — M25.50 POLYARTHRALGIA: ICD-10-CM

## 2021-04-29 DIAGNOSIS — M47.817 LUMBOSACRAL SPONDYLOSIS WITHOUT MYELOPATHY: ICD-10-CM

## 2021-04-29 DIAGNOSIS — Z79.899 HIGH RISK MEDICATION USE: ICD-10-CM

## 2021-04-29 DIAGNOSIS — G89.4 CHRONIC PAIN SYNDROME: Primary | ICD-10-CM

## 2021-04-29 PROCEDURE — 99443 PR PHYS/QHP TELEPHONE EVALUATION 21-30 MIN: CPT | Performed by: ANESTHESIOLOGY

## 2021-04-29 RX ORDER — HYDROCODONE BITARTRATE AND ACETAMINOPHEN 7.5; 325 MG/1; MG/1
1 TABLET ORAL 3 TIMES DAILY PRN
Qty: 90 TABLET | Refills: 0 | Status: SHIPPED | OUTPATIENT
Start: 2021-05-10 | End: 2021-05-11 | Stop reason: SDUPTHER

## 2021-04-29 RX ORDER — HYDROCODONE BITARTRATE AND ACETAMINOPHEN 7.5; 325 MG/1; MG/1
1 TABLET ORAL 3 TIMES DAILY PRN
Qty: 90 TABLET | Refills: 0 | Status: SHIPPED | OUTPATIENT
Start: 2021-06-09 | End: 2021-06-30 | Stop reason: SDUPTHER

## 2021-04-29 NOTE — PROGRESS NOTES
CC  hip/buttock, right leg, lower back pain  66-year-old female with chronic back pain here for follow-up by tel.     Continued worsening RLE radicular pain. Interferes with ADL.      Chronic back pain radiating to bilateral lower extremity worse on the left with aching numbness in both legs worse with standing walking or activity.  Denies new weakness saddle anesthesia bladder bowel incontinence.    Pain interfering with daily activity and sleep.  Marginal relief with physical therapy or anti-inflammatories.      Utilizes hydrocodone with good relief functional benefit and side effects.    Marginal relief with caudal JESSICA in the past.  Declines LESI due to past experience with post dural puncture headache.    L-spine MRI 2016: Multilevel degenerative disc disease and degenerative facet change as well as  multilevel postoperative changes detailed above.  There is a 14 mm of anterolisthesis of L4 on L5 which has worsened from the prior exam where this was only 4 mm.  This results in severe bilateral neural foraminal narrowing but no spinal canal stenosis.    Pain Assessment   Cause of Pain: surgery  Location of Pain: Lower Back, R Hip, L Hip, R Leg, L Leg  Description of Pain: Dull/Aching, Throbbing, Stabbing  Previous Pain Rating : 7  Current Pain Ratin  Aggravating Factors: Activity  Alleviating Factors: Rest, Medication  Previous Treatments: Epidural Steroids, Narcotic Pain Medication, Physical Therapy  Previous Diagnostic Studies: MRI   PEG Assessment   What number best describes your pain on average in the past week?5  What number best describes how, during the past week, pain has interfered with your enjoyment of life?8  What number best describes how, during the past week, pain has interfered with your general activity? 10     has a past medical history of Arthritis, Depression, Osteoporosis, and Spinal stenosis (2017).     has a past surgical history that includes Cervical Curettage; Anterior cervical  discectomy; Total vaginal hysterectomy; Appendectomy; Back surgery (2017); and Mediastinotomy for exploration / drainage / biopsy / removal Fb w/ cervical approach (2011).     Social History     Tobacco Use   • Smoking status: Former Smoker     Packs/day: 0.50     Types: Cigarettes     Quit date:      Years since quittin.3   • Smokeless tobacco: Never Used   Substance Use Topics   • Alcohol use: No     Review of Systems        See HPI      There were no vitals filed for this visit.  Physical Exam   NAD.      PHQ 9 on chart                     opioid risk tool low risk      Assessment /Plan     Diagnoses and all orders for this visit:    1. Chronic pain syndrome (Primary)  -     HYDROcodone-acetaminophen (NORCO) 7.5-325 MG per tablet; Take 1 tablet by mouth 3 (Three) Times a Day As Needed for Severe Pain . DNF before 2021  Dispense: 90 tablet; Refill: 0  -     HYDROcodone-acetaminophen (NORCO) 7.5-325 MG per tablet; Take 1 tablet by mouth 3 (Three) Times a Day As Needed for Severe Pain .  Dispense: 90 tablet; Refill: 0    2. Lumbosacral spondylosis without myelopathy    3. Lumbar radiculitis  -     Nerve Root Block    4. Polyarthralgia    5. High risk medication use      Summary   66-year-old female with chronic back pain here for follow-up.    chronic pain from lumbar DDD spondylosis with radicular pain.  Chronic polyarthralgia.  Sacroiliitis, repeat SI injection as needed    Continued  RLE radicular pain  Schedule right L4, L5 TFESI.   Consider repeat L-spine MRI    Continue hydrocodone 7.5/25 3 times daily.  UDS and inspect reviewed.  Discussed risk of tolerance, dependence, respiratory depression, coma and death associated with use of oral opioids for treatment of chronic nonmalignant pain.      Continue gabapentin/flexeril.  Cont. topical compounded neuropathic/anti-inflammatory cream with ketamine.    Telemedicine done to avoid coronavirus exposure  A phone visit was used to complete visit. Total  time of discussion was 22 minutes      RTC 2 mo

## 2021-05-06 RX ORDER — SULINDAC 200 MG/1
TABLET ORAL
Qty: 90 TABLET | Refills: 3 | Status: SHIPPED | OUTPATIENT
Start: 2021-05-06 | End: 2021-10-15

## 2021-05-06 RX ORDER — LISINOPRIL AND HYDROCHLOROTHIAZIDE 25; 20 MG/1; MG/1
TABLET ORAL
Qty: 90 TABLET | Refills: 0 | Status: SHIPPED | OUTPATIENT
Start: 2021-05-06 | End: 2021-07-08

## 2021-05-11 ENCOUNTER — TELEPHONE (OUTPATIENT)
Dept: PAIN MEDICINE | Facility: CLINIC | Age: 66
End: 2021-05-11

## 2021-05-11 DIAGNOSIS — G89.4 CHRONIC PAIN SYNDROME: ICD-10-CM

## 2021-05-11 RX ORDER — HYDROCODONE BITARTRATE AND ACETAMINOPHEN 7.5; 325 MG/1; MG/1
1 TABLET ORAL 3 TIMES DAILY PRN
Qty: 90 TABLET | Refills: 0 | Status: CANCELLED | OUTPATIENT
Start: 2021-05-11

## 2021-05-11 RX ORDER — HYDROCODONE BITARTRATE AND ACETAMINOPHEN 7.5; 325 MG/1; MG/1
1 TABLET ORAL 3 TIMES DAILY PRN
Qty: 90 TABLET | Refills: 0 | Status: SHIPPED | OUTPATIENT
Start: 2021-05-11 | End: 2021-06-24 | Stop reason: SDUPTHER

## 2021-05-12 ENCOUNTER — OFFICE VISIT (OUTPATIENT)
Dept: PULMONOLOGY | Facility: HOSPITAL | Age: 66
End: 2021-05-12

## 2021-05-12 VITALS
HEIGHT: 64 IN | OXYGEN SATURATION: 95 % | HEART RATE: 101 BPM | WEIGHT: 233.4 LBS | TEMPERATURE: 97.8 F | DIASTOLIC BLOOD PRESSURE: 71 MMHG | SYSTOLIC BLOOD PRESSURE: 123 MMHG | RESPIRATION RATE: 15 BRPM | BODY MASS INDEX: 39.85 KG/M2

## 2021-05-12 DIAGNOSIS — J47.9: Primary | ICD-10-CM

## 2021-05-12 DIAGNOSIS — R91.1 LUNG NODULE: ICD-10-CM

## 2021-05-12 PROCEDURE — G0463 HOSPITAL OUTPT CLINIC VISIT: HCPCS

## 2021-05-12 NOTE — PROGRESS NOTES
PULMONARY  CONSULT NOTE      PATIENT IDENTIFICATION:  Name: Sherrie Chand  Age: 66 y.o.  Sex: female  :  1955  MRN: [id number]    DATE OF CONSULTATION:  2021                     CHIEF COMPLAINT: Lung nodule    History of Present Illness:   Sherrie Chand is a 66 y.o. female history of smoking quit 40 years ago, presented today because survey CAT scan showed lung nodule in the right lower lobe, patient denies any cough no hemoptysis no change in her weight no chest pain  Patient has underlying COPD she does not feel that she is still requiring to be on any inhaler      Review of Systems:   Constitutional:  As above   Eyes: negative   ENT/oropharynx: negative   Cardiovascular: negative   Respiratory: negative   Gastrointestinal: negative   Genitourinary: negative   Neurological: negative   Musculoskeletal: negative   Integument/breast: negative   Endocrine: negative   Allergic/Immunologic: negative     Past Medical History:  Past Medical History:   Diagnosis Date   • Arthritis    • Depression    • Osteoporosis    • Spinal stenosis 2017       Past Surgical History:  Past Surgical History:   Procedure Laterality Date   • ANTERIOR CERVICAL DISCECTOMY      C3-C4/Ant. plating C3-C6 for non-union-abstracted from [facility]   • APPENDECTOMY     • BACK SURGERY  2017    Interior and posterior-Abstracted from [facility]   • CERVICAL CORPECTOMY      C5   • MEDIASTINOTOMY FOR EXPLORATION / DRAINAGE / BIOPSY / REMOVAL FB W/ CERVICAL APPROACH  2011    Removal of cervical plate-Abstracted from [facility]   • VAGINAL HYSTERECTOMY          Family History:  Family History   Problem Relation Age of Onset   • Arthritis Mother    • Arthritis Father    • Lung cancer Father    • Other Father         Other cancer   • Arthritis Maternal Grandmother    • Arthritis Maternal Grandfather    • Lung cancer Other         Social History:   Social History     Tobacco Use   • Smoking status: Former Smoker     Packs/day: 0.50     Types:  Cigarettes     Quit date: 2017     Years since quittin.3   • Smokeless tobacco: Never Used   Substance Use Topics   • Alcohol use: No        Allergies:  Allergies   Allergen Reactions   • Carisoprodol Shortness Of Breath     Respiratory, hives ([facility])       Home Meds:  (Not in a hospital admission)      Objective:    Vitals Ranges:   Temp:  [97.8 °F (36.6 °C)] 97.8 °F (36.6 °C)  Heart Rate:  [101] 101  Resp:  [15] 15  BP: (123)/(71) 123/71  Body mass index is 40.04 kg/m².     Exam:  General Appearance:  WDWN    HEENT:   without obvious abnormality,  Conjunctiva/corneas clear,  Normal external ear canals, no drainage    Clear orsalmucosa,  Mallampati score 3    Neck:  Supple, symmetrical, trachea midline. No JVD.  Lungs:   Bilateral basal rhonchi bilaterally, respirations unlabored symmetrical wall movement.    Chest wall:  No tenderness or deformity.    Heart:  Regular rate and rhythm, S1 and S2 normal.  Extremities: Trace edema no clubbing or Cyanosis        Data Review:  All labs (24hrs): No results found for this or any previous visit (from the past 24 hour(s)).     Imaging:  [facility] Guided Pain Management (Auto Finalize)  Please see performing physician's note for result.       ASSESSMENT:  Diagnoses and all orders for this visit:    Disease of bronchial airway (CMS/HCC)    Lung nodule  -     CT Chest Without Contrast Diagnostic; Future        PLAN:  Follow-up CAT scan in 6 months and manage  Detailed discussion with the patient about the finding on the current CAT scan     Bronchodilator inhaled corticosteroid    Education how to use inhalers    Encouraged to use incentive spirometer    Continue to exercise slowly as tolerated    Monitor for any change in the color of the sputum    Avoid any exposure to fumes, gas or any irritant        Follow-up 6 months    Maryann Cabrales MD. D, ABSM.  2021  14:10 EDT

## 2021-05-17 ENCOUNTER — HOSPITAL ENCOUNTER (OUTPATIENT)
Dept: PAIN MEDICINE | Facility: HOSPITAL | Age: 66
Discharge: HOME | End: 2021-05-17

## 2021-05-17 VITALS
TEMPERATURE: 96.9 F | HEART RATE: 69 BPM | RESPIRATION RATE: 16 BRPM | OXYGEN SATURATION: 95 % | DIASTOLIC BLOOD PRESSURE: 69 MMHG | SYSTOLIC BLOOD PRESSURE: 105 MMHG | BODY MASS INDEX: 41.29 KG/M2 | HEIGHT: 63 IN | WEIGHT: 233 LBS

## 2021-05-17 DIAGNOSIS — R52 PAIN: ICD-10-CM

## 2021-05-17 DIAGNOSIS — M54.16 LUMBAR RADICULITIS: Primary | ICD-10-CM

## 2021-05-17 PROCEDURE — 64484 NJX AA&/STRD TFRM EPI L/S EA: CPT | Performed by: ANESTHESIOLOGY

## 2021-05-17 PROCEDURE — 77003 FLUOROGUIDE FOR SPINE INJECT: CPT

## 2021-05-17 PROCEDURE — 64483 NJX AA&/STRD TFRM EPI L/S 1: CPT | Performed by: ANESTHESIOLOGY

## 2021-05-17 PROCEDURE — 0 IOPAMIDOL 41 % SOLUTION: Performed by: ANESTHESIOLOGY

## 2021-05-17 PROCEDURE — 25010000002 DEXAMETHASONE SODIUM PHOSPHATE 10 MG/ML SOLUTION: Performed by: ANESTHESIOLOGY

## 2021-05-17 RX ORDER — DEXAMETHASONE SODIUM PHOSPHATE 10 MG/ML
10 INJECTION, SOLUTION INTRAMUSCULAR; INTRAVENOUS ONCE
Status: COMPLETED | OUTPATIENT
Start: 2021-05-17 | End: 2021-05-17

## 2021-05-17 RX ORDER — BUPIVACAINE HYDROCHLORIDE 2.5 MG/ML
10 INJECTION, SOLUTION EPIDURAL; INFILTRATION; INTRACAUDAL ONCE
Status: COMPLETED | OUTPATIENT
Start: 2021-05-17 | End: 2021-05-17

## 2021-05-17 RX ADMIN — DEXAMETHASONE SODIUM PHOSPHATE 10 MG: 10 INJECTION, SOLUTION INTRAMUSCULAR; INTRAVENOUS at 09:24

## 2021-05-17 RX ADMIN — BUPIVACAINE HYDROCHLORIDE 5 ML: 2.5 INJECTION, SOLUTION EPIDURAL; INFILTRATION; INTRACAUDAL; PERINEURAL at 09:24

## 2021-05-17 RX ADMIN — IOPAMIDOL 1 ML: 408 INJECTION, SOLUTION INTRATHECAL at 09:24

## 2021-05-17 NOTE — PROCEDURES
"Subjective    CC righthip/ buttock pain  Sherrie Chand is a 66 y.o. female lumbar radiculitis/postlaminectomy syndrome here for right L4 and L5 nerve root block. No anticoagulation    Pain Assessment   Location of Pain: Lower Back, R Hip, right leg   description of Pain: Dull/Aching, Throbbing, Stabbing  Previous Pain Rating :6  Current Pain Ratin  Aggravating Factors: Activity  Alleviating Factors: Rest, Medication    The following portions of the patient's history were reviewed and updated as appropriate: allergies, current medications, past family history, past medical history, past social history, past surgical history and problem list.    Review of Systems  As in HPI  Objective   Physical Exam   Constitutional: She is oriented to person, place, and time. She appears well-developed. No distress.   Cardiovascular: Normal rate.   Pulmonary/Chest: Effort normal.   Musculoskeletal:      Lumbar back: She exhibits decreased range of motion and tenderness.   Neurological: She is alert and oriented to person, place, and time.     /71 (BP Location: Left arm, Patient Position: Sitting)   Pulse 90   Temp 96.9 °F (36.1 °C) (Skin)   Resp 18   Ht 160 cm (63\")   Wt 106 kg (233 lb)   LMP  (LMP Unknown)   SpO2 96%   BMI 41.27 kg/m²     Assessment/Plan    underwent right L4-L5 nerve root block   Consider caudal JESSICA/spinal stimulator.  RTC 4-6 weeks or as needed for repeat    DATE OF PROCEDURE:  2021    PREOPERATIVE DIAGNOSIS:   Lumbar radiculitis    POSTOPERATIVE DIAGNOSIS: Same    PROCEDURE PERFORMED: Right L4, L5  Transforaminal Epidural     The patient presents with a history of  lumbar degenerative disc disease with lumbosacral neuritis in the right leg at level [ L4,  L5].  The patient presents today for a transforaminal epidural. The patient understands the risks and benefits of the procedure and wishes to proceed. The patient was seen in the preoperative area.  Patient's consent was obtained and " updated.  Vitals were taken.  Patient was then brought to the procedure suite and placed in a prone position. Noninvasive monitoring per routine anesthesia protocol was placed.  The appropriate anatomic area was widely prepped with Chloroprep and draped in a sterile fashion.  Under fluoroscopic guidance using an AP and lateral view, a 22 guage curved tip spinal needle  was passed through skin anesthetized with 1% Lidocaine without epinephrine. The needle tip was advanced to the inferior medial aspect of the transverse process and carefully walked into the neuroforamin using an AP lateral view.  At no time were parathesias elicited.  At this point 2 mL of a solution containing  1 mL of 0.25% bupivacaine and 1 mL of 10 mg Decadron were injected.  Clear epidural spread using 0.25 mL of  preservative free contrast was obtained.  A sterile dressing was placed over the puncture site.    The patient tolerated the procedure with no complications . They were then brought to the post procedure area where they recovered nicely.    Discharge:  The patient will be discharged home in stable condition.   Patient understands to contact the Center with any post procedure questions or concerns.  Discharge instructions given by nursing staff.

## 2021-06-02 PROBLEM — I10 HYPERTENSION: Status: ACTIVE | Noted: 2021-06-02

## 2021-06-02 RX ORDER — DULOXETIN HYDROCHLORIDE 60 MG/1
CAPSULE, DELAYED RELEASE ORAL
COMMUNITY
Start: 2021-03-01 | End: 2021-06-03

## 2021-06-03 ENCOUNTER — LAB (OUTPATIENT)
Dept: LAB | Facility: HOSPITAL | Age: 66
End: 2021-06-03

## 2021-06-03 ENCOUNTER — OFFICE VISIT (OUTPATIENT)
Dept: ENDOCRINOLOGY | Facility: CLINIC | Age: 66
End: 2021-06-03

## 2021-06-03 VITALS
SYSTOLIC BLOOD PRESSURE: 125 MMHG | DIASTOLIC BLOOD PRESSURE: 72 MMHG | HEART RATE: 92 BPM | OXYGEN SATURATION: 96 % | BODY MASS INDEX: 41.82 KG/M2 | WEIGHT: 236 LBS | TEMPERATURE: 97.3 F | HEIGHT: 63 IN

## 2021-06-03 DIAGNOSIS — E04.2 MULTIPLE THYROID NODULES: Primary | ICD-10-CM

## 2021-06-03 DIAGNOSIS — E04.2 MULTIPLE THYROID NODULES: ICD-10-CM

## 2021-06-03 LAB
T4 FREE SERPL-MCNC: 0.86 NG/DL (ref 0.93–1.7)
TSH SERPL DL<=0.05 MIU/L-ACNC: 2.09 UIU/ML (ref 0.27–4.2)

## 2021-06-03 PROCEDURE — 99204 OFFICE O/P NEW MOD 45 MIN: CPT | Performed by: INTERNAL MEDICINE

## 2021-06-03 PROCEDURE — 86376 MICROSOMAL ANTIBODY EACH: CPT

## 2021-06-03 PROCEDURE — 84439 ASSAY OF FREE THYROXINE: CPT

## 2021-06-03 PROCEDURE — 36415 COLL VENOUS BLD VENIPUNCTURE: CPT

## 2021-06-03 PROCEDURE — 84443 ASSAY THYROID STIM HORMONE: CPT

## 2021-06-03 NOTE — PROGRESS NOTES
Endocrine Consult Outpatient  For by Dr. Ban Skaggs before multiple thyroid nodule consultation  Patient Care Team:  Ban Alexander MD as PCP - General (Family Medicine)  Sandra Pak MD as Consulting Physician (Pain Medicine)     Chief Complaint: Multiple thyroid nodules        HPI: 66-year-old female with history of hypertension, hyperlipidemia, back issues noticed something on the neck, underwent a CT scan of the neck back on 2021 which showed that she have multinodular goiter, she subsequently had a thyroid ultrasound done on 2021 which did show multiple nodules on her thyroid.  She is now referred here for further evaluation and management.  She denies any family history of thyroid cancer or history of radiation exposure.  She does have some trouble swallowing.  Denies any change in the voice or hoarseness.  She has to clear her throat all the time.  She is not taking any thyroid medications at this time.    Data reviewed:  Ultrasound report reviewed showed multiple nodules and according the report it was 7 documented nodules.  The dominant nodule left-sided 1.5 cm millimeters and a dominant node in the right sided 1.4 cm these were nonsuspicious nodule.    Past Medical History:   Diagnosis Date   • Arthritis    • Depression    • Osteoporosis    • Spinal stenosis 2017       Social History     Socioeconomic History   • Marital status:      Spouse name: Not on file   • Number of children: Not on file   • Years of education: Not on file   • Highest education level: Not on file   Tobacco Use   • Smoking status: Former Smoker     Packs/day: 0.50     Types: Cigarettes     Quit date: 2017     Years since quittin.4   • Smokeless tobacco: Never Used   Vaping Use   • Vaping Use: Former   Substance and Sexual Activity   • Alcohol use: No   • Drug use: No   • Sexual activity: Yes     Partners: Male     Birth control/protection: None       Family History   Problem Relation  Age of Onset   • Arthritis Mother    • Arthritis Father    • Lung cancer Father    • Other Father         Other cancer   • Diabetes Father    • Arthritis Maternal Grandmother    • Lung cancer Other        Allergies   Allergen Reactions   • Carisoprodol Shortness Of Breath     Respiratory, hives (SOMA)       ROS:   Constitutional:  Admit fatigue, tiredness.    Eyes:  Denies change in visual acuity   HENT:  Denies nasal congestion or sore throat   Respiratory: denies cough, shortness of breath.   Cardiovascular:  denies chest pain, edema   GI:  Denies abdominal pain, nausea, vomiting.    :  Denies dysuria   Musculoskeletal:  Denies back pain or joint pain   Integument:  Denies dry skin, rash   Neurologic:  Denies headache, focal weakness or sensory changes   Endocrine:  Denies polyuria or polydipsia   Psychiatric:  Denies depression or anxiety      Vitals:    06/03/21 0907   BP: 125/72   Pulse: 92   Temp: 97.3 °F (36.3 °C)   SpO2: 96%     Body mass index is 41.81 kg/m².      Physical Exam:  GEN: NAD, conversant, obese  EYES: EOMI, PERRL, no conjunctival erythema  NECK: no thyromegaly, full ROM   CV: RRR, no murmurs/rubs/gallops, no peripheral edema  LUNG: CTAB, no wheezes/rales/ronchi  SKIN: no rashes, no acanthosis  MSK: no deformities, full ROM of all extremities  NEURO: no tremors, DTR normal  PSYCH: AOX3, appropriate mood, affect normal      Results Review:     I reviewed the patient's new clinical results.    Lab Results   Component Value Date    GLUCOSE 114 (H) 12/22/2020    BUN 15 12/22/2020    CREATININE 0.88 12/22/2020    EGFRIFNONA 65 12/22/2020    BCR 17.0 12/22/2020    K 3.9 12/22/2020    CO2 23.8 12/22/2020    CALCIUM 10.0 12/22/2020    ALBUMIN 4.40 12/22/2020    LABIL2 1.4 01/14/2019    AST 20 12/22/2020    ALT 30 12/22/2020    CHOL 133 12/22/2020    TRIG 179 (H) 12/22/2020    HDL 39 (L) 12/22/2020    LDL 64 12/22/2020     Lab Results   Component Value Date    HGBA1C 5.4 12/22/2020    HGBA1C 5.2  06/23/2020     Lab Results   Component Value Date    CREATININE 0.88 12/22/2020     Lab Results   Component Value Date    TSH 1.440 12/22/2020     Comprehensive Metabolic Panel (12/22/2020 11:01)   TSH (12/22/2020 11:01)   US Thyroid (03/24/2021)       Medication Review: Reviewed.       Current Outpatient Medications:   •  atorvastatin (LIPITOR) 40 MG tablet, TAKE 1 TABLET EVERY NIGHT, Disp: 90 tablet, Rfl: 3  •  Calcium Carbonate-Vitamin D 600-400 MG-UNIT chewable tablet, Chew 1 tablet Daily., Disp: , Rfl:   •  cyclobenzaprine (FLEXERIL) 10 MG tablet, TAKE 1 TABLET BY MOUTH 3 (THREE) TIMES A DAY AS NEEDED FOR MUSCLE SPASMS., Disp: 90 tablet, Rfl: 11  •  DULoxetine (CYMBALTA) 60 MG capsule, Take 1 capsule by mouth Daily., Disp: 90 capsule, Rfl: 1  •  gabapentin (NEURONTIN) 800 MG tablet, Take 1 tablet by mouth 3 (Three) Times a Day., Disp: 90 tablet, Rfl: 11  •  [START ON 6/9/2021] HYDROcodone-acetaminophen (NORCO) 7.5-325 MG per tablet, Take 1 tablet by mouth 3 (Three) Times a Day As Needed for Severe Pain . DNF before 6/9/2021, Disp: 90 tablet, Rfl: 0  •  HYDROcodone-acetaminophen (NORCO) 7.5-325 MG per tablet, Take 1 tablet by mouth 3 (Three) Times a Day As Needed for Severe Pain ., Disp: 90 tablet, Rfl: 0  •  lisinopril-hydrochlorothiazide (PRINZIDE,ZESTORETIC) 20-25 MG per tablet, TAKE 1 TABLET EVERY DAY, Disp: 90 tablet, Rfl: 0  •  St Johns Wort 150 MG capsule, Take 1 capsule by mouth Daily., Disp: , Rfl:   •  sulindac (CLINORIL) 200 MG tablet, TAKE 1 TABLET EVERY DAY, Disp: 90 tablet, Rfl: 3  •  vitamin B-6 (PYRIDOXINE) 100 MG tablet, Take 300 mg by mouth Daily., Disp: , Rfl:     Assessment/Plan   Multiple thyroid nodules: 66-year-old female with history of hypertension, hyperlipidemia and back issues noticed something her thyroid, subsequently found to have multiple thyroid nodules with a dominant nodule on the right and the left side but nonsuspicious.  No risk factors for thyroid cancer.  Will check TSH  with free T4 and TPO antibodies and recommend follow-up ultrasound in 6 months to document the stability of these nodules.  I explained this to the patient and all questions were answered.           Azalia Mcmanus MD FACE.

## 2021-06-04 LAB — THYROPEROXIDASE AB SERPL-ACNC: <9 IU/ML (ref 0–34)

## 2021-06-23 NOTE — PATIENT INSTRUCTIONS
Health Maintenance Due   Topic Date Due   • COLORECTAL CANCER SCREENING  Never done   • TDAP/TD VACCINES (1 - Tdap) Never done   • ZOSTER VACCINE (1 of 2) Never done   • MAMMOGRAM  06/24/2020   • ANNUAL WELLNESS VISIT  06/23/2021     Advise chest CT and mammogram now that covid has lessened    Staff to send 3 stool collection cups.  Patient knows to take fresh specimen to lab at [place].  If still diarrhea-3 days later second sample.  Don't start Flagyl till first specimen collected.    12 hour fast for labs  Advance Directive       Advance directives are legal documents that let you make choices ahead of time about your health care and medical treatment in case you become unable to communicate for yourself. Advance directives are a way for you to communicate your wishes to family, friends, and health care providers. This can help convey your decisions about end-of-life care if you become unable to communicate.  Discussing and writing advance directives should happen over time rather than all at once. Advance directives can be changed depending on your situation and what you want, even after you have signed the advance directives.  If you do not have an advance directive, some states assign family decision makers to act on your behalf based on how closely you are related to them. Each state has its own laws regarding advance directives. You may want to check with your health care provider, , or state representative about the laws in your state. There are different types of advance directives, such as:  · Medical power of .  · Living will.  · Do not resuscitate (DNR) or do not attempt resuscitation (DNAR) order.  Health care proxy and medical power of   A health care proxy, also called a health care agent, is a person who is appointed to make medical decisions for you in cases in which you are unable to make the decisions yourself. Generally, people choose someone they know well and trust to  represent their preferences. Make sure to ask this person for an agreement to act as your proxy. A proxy may have to exercise judgment in the event of a medical decision for which your wishes are not known.  A medical power of  is a legal document that names your health care proxy. Depending on the laws in your state, after the document is written, it may also need to be:  · Signed.  · Notarized.  · Dated.  · Copied.  · Witnessed.  · Incorporated into your medical record.  You may also want to appoint someone to manage your financial affairs in a situation in which you are unable to do so. This is called a durable power of  for finances. It is a separate legal document from the durable power of  for health care. You may choose the same person or someone different from your health care proxy to act as your agent in financial matters.  If you do not appoint a proxy, or if there is a concern that the proxy is not acting in your best interests, a court-appointed guardian may be designated to act on your behalf.  Living will  A living will is a set of instructions documenting your wishes about medical care when you cannot express them yourself. Health care providers should keep a copy of your living will in your medical record. You may want to give a copy to family members or friends. To alert caregivers in case of an emergency, you can place a card in your wallet to let them know that you have a living will and where they can find it. A living will is used if you become:  · Terminally ill.  · Incapacitated.  · Unable to communicate or make decisions.  Items to consider in your living will include:  · The use or non-use of life-sustaining equipment, such as dialysis machines and breathing machines (ventilators).  · A DNR or DNAR order, which is the instruction not to use cardiopulmonary resuscitation (CPR) if breathing or heartbeat stops.  · The use or non-use of tube feeding.  · Withholding of  food and fluids.  · Comfort (palliative) care when the goal becomes comfort rather than a cure.  · Organ and tissue donation.  A living will does not give instructions for distributing your money and property if you should pass away. It is recommended that you seek the advice of a  when writing a will. Decisions about taxes, beneficiaries, and asset distribution will be legally binding. This process can relieve your family and friends of any concerns surrounding disputes or questions that may come up about the distribution of your assets.  DNR or DNAR  A DNR or DNAR order is a request not to have CPR in the event that your heart stops beating or you stop breathing. If a DNR or DNAR order has not been made and shared, a health care provider will try to help any patient whose heart has stopped or who has stopped breathing. If you plan to have surgery, talk with your health care provider about how your DNR or DNAR order will be followed if problems occur.  Summary  · Advance directives are the legal documents that allow you to make choices ahead of time about your health care and medical treatment in case you become unable to communicate for yourself.  · The process of discussing and writing advance directives should happen over time. You can change the advance directives, even after you have signed them.  · Advance directives include DNR or DNAR orders, living scruggs, and designating an agent as your medical power of .  This information is not intended to replace advice given to you by your health care provider. Make sure you discuss any questions you have with your health care provider.  Document Released: 03/26/2009 Document Revised: 11/06/2017 Document Reviewed: 11/06/2017  [employer] Interactive Patient Education © 2019 [employer]     Calorie Counting for Weight Loss  Calories are units of energy. Your body needs a certain amount of calories from food to keep you going throughout the day. When you  eat more calories than your body needs, your body stores the extra calories as fat. When you eat fewer calories than your body needs, your body burns fat to get the energy it needs.  Calorie counting means keeping track of how many calories you eat and drink each day. Calorie counting can be helpful if you need to lose weight. If you make sure to eat fewer calories than your body needs, you should lose weight. Ask your health care provider what a healthy weight is for you.  For calorie counting to work, you will need to eat the right number of calories in a day in order to lose a healthy amount of weight per week. A dietitian can help you determine how many calories you need in a day and will give you suggestions on how to reach your calorie goal.  · A healthy amount of weight to lose per week is usually 1-2 lb (0.5-0.9 kg). This usually means that your daily calorie intake should be reduced by 500-750 calories.  · Eating 1,200 - 1,500 calories per day can help most women lose weight.  · Eating 1,500 - 1,800 calories per day can help most men lose weight.  What is my plan?  My goal is to have __________ calories per day.  If I have this many calories per day, I should lose around __________ pounds per week.  What do I need to know about calorie counting?  In order to meet your daily calorie goal, you will need to:  · Find out how many calories are in each food you would like to eat. Try to do this before you eat.  · Decide how much of the food you plan to eat.  · Write down what you ate and how many calories it had. Doing this is called keeping a food log.  To successfully lose weight, it is important to balance calorie counting with a healthy lifestyle that includes regular activity. Aim for 150 minutes of moderate exercise (such as walking) or 75 minutes of vigorous exercise (such as running) each week.  Where do I find calorie information?      The number of calories in a food can be found on a Nutrition Facts  label. If a food does not have a Nutrition Facts label, try to look up the calories online or ask your dietitian for help.  Remember that calories are listed per serving. If you choose to have more than one serving of a food, you will have to multiply the calories per serving by the amount of servings you plan to eat. For example, the label on a package of bread might say that a serving size is 1 slice and that there are 90 calories in a serving. If you eat 1 slice, you will have eaten 90 calories. If you eat 2 slices, you will have eaten 180 calories.  How do I keep a food log?  Immediately after each meal, record the following information in your food log:  · What you ate. Don't forget to include toppings, sauces, and other extras on the food.  · How much you ate. This can be measured in cups, ounces, or number of items.  · How many calories each food and drink had.  · The total number of calories in the meal.  Keep your food log near you, such as in a small notebook in your pocket, or use a mobile gena or website. Some programs will calculate calories for you and show you how many calories you have left for the day to meet your goal.  What are some calorie counting tips?      · Use your calories on foods and drinks that will fill you up and not leave you hungry:  ? Some examples of foods that fill you up are nuts and nut butters, vegetables, lean proteins, and high-fiber foods like whole grains. High-fiber foods are foods with more than 5 g fiber per serving.  ? Drinks such as sodas, specialty coffee drinks, alcohol, and juices have a lot of calories, yet do not fill you up.  · Eat nutritious foods and avoid empty calories. Empty calories are calories you get from foods or beverages that do not have many vitamins or protein, such as candy, sweets, and soda. It is better to have a nutritious high-calorie food (such as an avocado) than a food with few nutrients (such as a bag of chips).  · Know how many calories are  "in the foods you eat most often. This will help you calculate calorie counts faster.  · Pay attention to calories in drinks. Low-calorie drinks include water and unsweetened drinks.  · Pay attention to nutrition labels for \"low fat\" or \"fat free\" foods. These foods sometimes have the same amount of calories or more calories than the full fat versions. They also often have added sugar, starch, or salt, to make up for flavor that was removed with the fat.  · Find a way of tracking calories that works for you. Get creative. Try different apps or programs if writing down calories does not work for you.  What are some portion control tips?  · Know how many calories are in a serving. This will help you know how many servings of a certain food you can have.  · Use a measuring cup to measure serving sizes. You could also try weighing out portions on a kitchen scale. With time, you will be able to estimate serving sizes for some foods.  · Take some time to put servings of different foods on your favorite plates, bowls, and cups so you know what a serving looks like.  · Try not to eat straight from a bag or box. Doing this can lead to overeating. Put the amount you would like to eat in a cup or on a plate to make sure you are eating the right portion.  · Use smaller plates, glasses, and bowls to prevent overeating.  · Try not to multitask (for example, watch TV or use your computer) while eating. If it is time to eat, sit down at a table and enjoy your food. This will help you to know when you are full. It will also help you to be aware of what you are eating and how much you are eating.  What are tips for following this plan?  Reading food labels  · Check the calorie count compared to the serving size. The serving size may be smaller than what you are used to eating.  · Check the source of the calories. Make sure the food you are eating is high in vitamins and protein and low in saturated and trans fats.  Shopping  · Read " "nutrition labels while you shop. This will help you make healthy decisions before you decide to purchase your food.  · Make a grocery list and stick to it.  Cooking  · Try to cook your favorite foods in a healthier way. For example, try baking instead of frying.  · Use low-fat dairy products.  Meal planning  · Use more fruits and vegetables. Half of your plate should be fruits and vegetables.  · Include lean proteins like poultry and fish.  How do I count calories when eating out?  · Ask for smaller portion sizes.  · Consider sharing an entree and sides instead of getting your own entree.  · If you get your own entree, eat only half. Ask for a box at the beginning of your meal and put the rest of your entree in it so you are not tempted to eat it.  · If calories are listed on the menu, choose the lower calorie options.  · Choose dishes that include vegetables, fruits, whole grains, low-fat dairy products, and lean protein.  · Choose items that are boiled, broiled, grilled, or steamed. Stay away from items that are buttered, battered, fried, or served with cream sauce. Items labeled \"crispy\" are usually fried, unless stated otherwise.  · Choose water, low-fat milk, unsweetened iced tea, or other drinks without added sugar. If you want an alcoholic beverage, choose a lower calorie option such as a glass of wine or light beer.  · Ask for dressings, sauces, and syrups on the side. These are usually high in calories, so you should limit the amount you eat.  · If you want a salad, choose a garden salad and ask for grilled meats. Avoid extra toppings like richardson, cheese, or fried items. Ask for the dressing on the side, or ask for olive oil and vinegar or lemon to use as dressing.  · Estimate how many servings of a food you are given. For example, a serving of cooked rice is ½ cup or about the size of half a baseball. Knowing serving sizes will help you be aware of how much food you are eating at restaurants. The list below " tells you how big or small some common portion sizes are based on everyday objects:  ? 1 oz--4 stacked dice.  ? 3 oz--1 deck of cards.  ? 1 tsp--1 die.  ? 1 Tbsp--½ a ping-pong ball.  ? 2 Tbsp--1 ping-pong ball.  ? ½ cup--½ baseball.  ? 1 cup--1 baseball.  Summary  · Calorie counting means keeping track of how many calories you eat and drink each day. If you eat fewer calories than your body needs, you should lose weight.  · A healthy amount of weight to lose per week is usually 1-2 lb (0.5-0.9 kg). This usually means reducing your daily calorie intake by 500-750 calories.  · The number of calories in a food can be found on a Nutrition Facts label. If a food does not have a Nutrition Facts label, try to look up the calories online or ask your dietitian for help.  · Use your calories on foods and drinks that will fill you up, and not on foods and drinks that will leave you hungry.  · Use smaller plates, glasses, and bowls to prevent overeating.  This information is not intended to replace advice given to you by your health care provider. Make sure you discuss any questions you have with your health care provider.  Document Released: 12/18/2006 Document Revised: 09/06/2019 Document Reviewed: 11/17/2017  [employer] Interactive Patient Education © 2019 [employer]

## 2021-06-24 ENCOUNTER — OFFICE VISIT (OUTPATIENT)
Dept: FAMILY MEDICINE CLINIC | Facility: CLINIC | Age: 66
End: 2021-06-24

## 2021-06-24 VITALS
BODY MASS INDEX: 41.21 KG/M2 | OXYGEN SATURATION: 95 % | TEMPERATURE: 98.2 F | WEIGHT: 232.6 LBS | SYSTOLIC BLOOD PRESSURE: 120 MMHG | DIASTOLIC BLOOD PRESSURE: 64 MMHG | HEART RATE: 84 BPM | HEIGHT: 63 IN

## 2021-06-24 DIAGNOSIS — E04.2 MULTIPLE THYROID NODULES: ICD-10-CM

## 2021-06-24 DIAGNOSIS — I10 ESSENTIAL HYPERTENSION: ICD-10-CM

## 2021-06-24 DIAGNOSIS — R19.7 DIARRHEA OF PRESUMED INFECTIOUS ORIGIN: ICD-10-CM

## 2021-06-24 DIAGNOSIS — E78.5 HYPERLIPIDEMIA, UNSPECIFIED HYPERLIPIDEMIA TYPE: ICD-10-CM

## 2021-06-24 DIAGNOSIS — F32.A DEPRESSION, UNSPECIFIED DEPRESSION TYPE: ICD-10-CM

## 2021-06-24 DIAGNOSIS — Z87.891 FORMER SMOKER: ICD-10-CM

## 2021-06-24 DIAGNOSIS — R91.1 LUNG NODULE: ICD-10-CM

## 2021-06-24 DIAGNOSIS — Z00.01 ENCOUNTER FOR ANNUAL GENERAL MEDICAL EXAMINATION WITH ABNORMAL FINDINGS IN ADULT: Primary | ICD-10-CM

## 2021-06-24 DIAGNOSIS — J47.9: ICD-10-CM

## 2021-06-24 DIAGNOSIS — M81.0 AGE RELATED OSTEOPOROSIS, UNSPECIFIED PATHOLOGICAL FRACTURE PRESENCE: ICD-10-CM

## 2021-06-24 DIAGNOSIS — Z12.11 SCREENING FOR COLON CANCER: ICD-10-CM

## 2021-06-24 DIAGNOSIS — E66.01 CLASS 3 SEVERE OBESITY DUE TO EXCESS CALORIES WITH SERIOUS COMORBIDITY AND BODY MASS INDEX (BMI) OF 40.0 TO 44.9 IN ADULT (HCC): ICD-10-CM

## 2021-06-24 DIAGNOSIS — R73.9 HYPERGLYCEMIA: ICD-10-CM

## 2021-06-24 DIAGNOSIS — Z12.31 ENCOUNTER FOR SCREENING MAMMOGRAM FOR MALIGNANT NEOPLASM OF BREAST: ICD-10-CM

## 2021-06-24 PROBLEM — E66.813 CLASS 3 SEVERE OBESITY DUE TO EXCESS CALORIES WITH SERIOUS COMORBIDITY AND BODY MASS INDEX (BMI) OF 40.0 TO 44.9 IN ADULT: Status: ACTIVE | Noted: 2021-06-24

## 2021-06-24 PROCEDURE — 99213 OFFICE O/P EST LOW 20 MIN: CPT | Performed by: PREVENTIVE MEDICINE

## 2021-06-24 PROCEDURE — 96160 PT-FOCUSED HLTH RISK ASSMT: CPT | Performed by: PREVENTIVE MEDICINE

## 2021-06-24 PROCEDURE — 99396 PREV VISIT EST AGE 40-64: CPT | Performed by: PREVENTIVE MEDICINE

## 2021-06-24 PROCEDURE — 1160F RVW MEDS BY RX/DR IN RCRD: CPT | Performed by: PREVENTIVE MEDICINE

## 2021-06-24 PROCEDURE — G0439 PPPS, SUBSEQ VISIT: HCPCS | Performed by: PREVENTIVE MEDICINE

## 2021-06-24 PROCEDURE — 1170F FXNL STATUS ASSESSED: CPT | Performed by: PREVENTIVE MEDICINE

## 2021-06-24 RX ORDER — METRONIDAZOLE 500 MG/1
500 TABLET ORAL 3 TIMES DAILY
Qty: 30 TABLET | Refills: 0 | Status: SHIPPED | OUTPATIENT
Start: 2021-06-24 | End: 2021-06-25 | Stop reason: SDUPTHER

## 2021-06-24 NOTE — PROGRESS NOTES
The ABCs of the Annual Wellness Visit  Subsequent Medicare Wellness Visit    Chief Complaint   Patient presents with   • Medicare Wellness-subsequent   • Hyperlipidemia   • Hypertension   66-year-old white female presents today for age-specific physical and Medicare physical.  Patient has been advised to wear sunscreen and seatbelt.  Patient is also here to check on multiple chronic health conditions most of which are stable.  She does have one significant new problem and that she has had diarrhea for the last 2 weeks that is quite odiferous and she has had a history of C. difficile diarrhea in the past it did respond to Flagyl we will test this today and after 1 sample collection she will start on Flagyl 500 mg 3 times a day.    Subjective   History of Present Illness:  Sherrie Chand is a 66 y.o. female who presents for a Subsequent Medicare Wellness Visit.    HEALTH RISK ASSESSMENT    Recent Hospitalizations:  No hospitalization(s) within the last year.    Current Medical Providers:  Patient Care Team:  Ban Alexander MD as PCP - General (Family Medicine)  Sandra Pak MD as Consulting Physician (Pain Medicine)    Smoking Status:  Social History     Tobacco Use   Smoking Status Former Smoker   • Packs/day: 0.50   • Years: 30.00   • Pack years: 15.00   • Types: Cigarettes   • Quit date:    • Years since quittin.4   Smokeless Tobacco Never Used       Alcohol Consumption:  Social History     Substance and Sexual Activity   Alcohol Use No       Depression Screen:   PHQ-2/PHQ-9 Depression Screening 2021   Little interest or pleasure in doing things 1   Feeling down, depressed, or hopeless 0   Total Score 1       Fall Risk Screen:  STEADI Fall Risk Assessment has not been completed.    Health Habits and Functional and Cognitive Screening:  Functional & Cognitive Status 2021   Do you have difficulty preparing food and eating? No   Do you have difficulty bathing yourself, getting dressed  or grooming yourself? No   Do you have difficulty using the toilet? No   Do you have difficulty moving around from place to place? Yes   Do you have trouble with steps or getting out of a bed or a chair? Yes   Current Diet Well Balanced Diet   Dental Exam Up to date   Eye Exam Not up to date   Exercise (times per week) 7 times per week   Current Exercises Include Dancing;Walking   Current Exercise Activities Include -   Do you need help using the phone?  No   Are you deaf or do you have serious difficulty hearing?  No   Do you need help with transportation? No   Do you need help shopping? No   Do you need help preparing meals?  No   Do you need help with housework?  Yes   Do you need help with laundry? No   Do you need help taking your medications? No   Do you need help managing money? No   Do you ever drive or ride in a car without wearing a seat belt? No   Have you felt unusual stress, anger or loneliness in the last month? No   Who do you live with? Spouse   If you need help, do you have trouble finding someone available to you? -   Have you been bothered in the last four weeks by sexual problems? -   Do you have difficulty concentrating, remembering or making decisions? No         Does the patient have evidence of cognitive impairment? No    Asprin use counseling:Does not need ASA (and currently is not on it)    Age-appropriate Screening Schedule:  Refer to the list below for future screening recommendations based on patient's age, sex and/or medical conditions. Orders for these recommended tests are listed in the plan section. The patient has been provided with a written plan.    Health Maintenance   Topic Date Due   • TDAP/TD VACCINES (1 - Tdap) Never done   • ZOSTER VACCINE (1 of 2) Never done   • MAMMOGRAM  06/24/2020   • INFLUENZA VACCINE  08/01/2021   • DXA SCAN  10/31/2021   • LIPID PANEL  12/22/2021   • PAP SMEAR  03/12/2022          The following portions of the patient's history were reviewed and  updated as appropriate: allergies, current medications, past family history, past medical history, past social history, past surgical history and problem list.    Outpatient Medications Prior to Visit   Medication Sig Dispense Refill   • atorvastatin (LIPITOR) 40 MG tablet TAKE 1 TABLET EVERY NIGHT 90 tablet 3   • Calcium Carbonate-Vitamin D 600-400 MG-UNIT chewable tablet Chew 1 tablet Daily.     • cyclobenzaprine (FLEXERIL) 10 MG tablet TAKE 1 TABLET BY MOUTH 3 (THREE) TIMES A DAY AS NEEDED FOR MUSCLE SPASMS. 90 tablet 11   • DULoxetine (CYMBALTA) 60 MG capsule Take 1 capsule by mouth Daily. 90 capsule 1   • gabapentin (NEURONTIN) 800 MG tablet Take 1 tablet by mouth 3 (Three) Times a Day. 90 tablet 11   • HYDROcodone-acetaminophen (NORCO) 7.5-325 MG per tablet Take 1 tablet by mouth 3 (Three) Times a Day As Needed for Severe Pain . DNF before 6/9/2021 90 tablet 0   • lisinopril-hydrochlorothiazide (PRINZIDE,ZESTORETIC) 20-25 MG per tablet TAKE 1 TABLET EVERY DAY 90 tablet 0   • St Johns Wort 150 MG capsule Take 1 capsule by mouth Daily.     • sulindac (CLINORIL) 200 MG tablet TAKE 1 TABLET EVERY DAY 90 tablet 3   • vitamin B-6 (PYRIDOXINE) 100 MG tablet Take 300 mg by mouth Daily.     • HYDROcodone-acetaminophen (NORCO) 7.5-325 MG per tablet Take 1 tablet by mouth 3 (Three) Times a Day As Needed for Severe Pain . 90 tablet 0     No facility-administered medications prior to visit.       Patient Active Problem List   Diagnosis   • Anxiety   • Arthritis   • B12 deficiency   • Chronic pain   • Constipation due to pain medication   • Degeneration of lumbar or lumbosacral intervertebral disc   • Depression   • Family history of lung cancer   • Former smoker   • Hyperglycemia   • Hyperlipidemia   • Hypertension   • Osteoarthritis of lumbosacral spine without myelopathy   • Other long term (current) drug therapy   • Vitamin D deficiency   • Astigmatism   • Pseudoarthrosis of lumbar spine   • S/P lumbar fusion   • Senile  "nuclear sclerosis   • Spinal stenosis   • Tear film insufficiency   • Spondylolisthesis at L4-L5 level   • Lung nodule   • Disease of bronchial airway (CMS/[facility])   • Multiple thyroid nodules   • Hypertension   • Osteoporosis   • Diarrhea of presumed infectious origin   • Morbid (severe) obesity due to excess calories (CMS/HCC)   • Class 3 severe obesity due to excess calories with serious comorbidity and body mass index (BMI) of 40.0 to 44.9 in adult (CMS/[facility])       Advanced Care Planning:  ACP discussion was held with the patient during this visit. Patient does not have an advance directive, information provided.    Review of Systems   Constitutional: Negative.    Eyes: Negative.    Respiratory: Negative.    Cardiovascular: Negative.    Gastrointestinal: Positive for diarrhea.   Endocrine: Negative.    Genitourinary: Negative.    Musculoskeletal: Positive for back pain, gait problem and myalgias.   Skin: Negative.    Allergic/Immunologic: Negative.    Hematological: Negative.    Psychiatric/Behavioral: Positive for dysphoric mood.       Compared to one year ago, the patient feels her physical health is worse.  Compared to one year ago, the patient feels her mental health is the same.    Reviewed chart for potential of high risk medication in the elderly: yes  Reviewed chart for potential of harmful drug interactions in the elderly:yes    Objective         Vitals:    06/24/21 1021 06/24/21 1023 06/24/21 1026   BP: 107/66 109/58 120/64   BP Location: Right arm Left arm Right arm   Patient Position: Sitting Sitting Standing   Cuff Size: Adult Adult Adult   Pulse: 84     Temp: 98.2 °F (36.8 °C)     SpO2: 95%     Weight: 106 kg (232 lb 9.6 oz)     Height: 160 cm (62.99\")     PainSc: 0-No pain         Body mass index is 41.21 kg/m².  Discussed the patient's BMI with her. The BMI is above average; BMI management plan is completed.    Physical Exam  Vitals reviewed.   Constitutional:       General: She is not in acute " distress.     Appearance: She is well-developed. She is obese. She is not ill-appearing or toxic-appearing.   HENT:      Head: Normocephalic and atraumatic.      Right Ear: Tympanic membrane, ear canal and external ear normal.      Left Ear: Tympanic membrane, ear canal and external ear normal.      Nose: Nose normal.   Eyes:      Extraocular Movements: Extraocular movements intact.      Conjunctiva/sclera: Conjunctivae normal.      Pupils: Pupils are equal, round, and reactive to light.   Cardiovascular:      Rate and Rhythm: Normal rate and regular rhythm.      Heart sounds: Normal heart sounds.   Pulmonary:      Effort: Pulmonary effort is normal.      Breath sounds: Normal breath sounds.   Abdominal:      General: Bowel sounds are normal. There is no distension.      Palpations: Abdomen is soft. There is no mass.      Tenderness: There is no abdominal tenderness.   Musculoskeletal:         General: Normal range of motion.      Cervical back: Neck supple.   Skin:     General: Skin is warm.   Neurological:      General: No focal deficit present.      Mental Status: She is alert and oriented to person, place, and time.   Psychiatric:         Mood and Affect: Mood normal.         Behavior: Behavior normal.               Assessment/Plan   Medicare Risks and Personalized Health Plan  CMS Preventative Services Quick Reference  Advance Directive Discussion  Obesity/Overweight     The above risks/problems have been discussed with the patient.  Pertinent information has been shared with the patient in the After Visit Summary.  Follow up plans and orders are seen below in the Assessment/Plan Section.    Diagnoses and all orders for this visit:    1. Encounter for annual general medical examination with abnormal findings in adult (Primary)  Comments:  Patient has been advised to wear sunscreen and seatbelt  Orders:  -     CBC Auto Differential    2. Screening for colon cancer  -     Cologuard - Stool, Per Rectum;  Future    3. Encounter for screening mammogram for malignant neoplasm of breast    4. Former smoker  Comments:  Low-dose CT of the chest was again advised.  Patient will consider    5. Hyperglycemia  Comments:  Trying to eat less carbs  Orders:  -     Comprehensive Metabolic Panel  -     Hemoglobin A1c    6. Hyperlipidemia, unspecified hyperlipidemia type  Comments:  Trying to eat less saturated fats  Orders:  -     Lipid Panel    7. Essential hypertension  Comments:  Blood pressure controlled no headache or chest pain    8. Lung nodule  Comments:  Pulmonologist we will repeat the CT of the chest later on in the year    9. Multiple thyroid nodules  Comments:  Biopsy obtained an ultrasound of the thyroid will be repeated by the endocrinologist.  Orders:  -     TSH    10. Depression, unspecified depression type  Comments:  Controlled No SI or HI    11. Disease of bronchial airway (CMS/HCC)  Comments:  Breathing is controlled    12. Age related osteoporosis, unspecified pathological fracture presence  Comments:  Patient trying to get daily weightbearing exercise to help with osteoporosis Rollator was prescribed.    13. Diarrhea of presumed infectious origin  Comments:  Patient has had C. difficile diarrhea before think she has again stool cultures will be obtained and then she will start Flagyl 500 mg 3 times a day.  Orders:  -     Clostridium Difficile EIA - Stool, Per Rectum; Future  -     Stool Culture (Reference Lab) - Stool, Per Rectum; Future  -     Ova & Parasite Examination - Stool, Per Rectum; Future  -     Fecal Lactoferrin - Stool, Per Rectum; Future  -     Clostridium Difficile EIA - Stool, Per Rectum; Future  -     Stool Culture (Reference Lab) - Stool, Per Rectum; Future  -     Ova & Parasite Examination - Stool, Per Rectum; Future  -     Fecal Lactoferrin - Stool, Per Rectum; Future    14. Class 3 severe obesity due to excess calories with serious comorbidity and body mass index (BMI) of 40.0 to 44.9 in  adult (CMS/HCC)    Other orders  -     metroNIDAZOLE (Flagyl) 500 MG tablet; Take 1 tablet by mouth 3 (Three) Times a Day.  Dispense: 30 tablet; Refill: 0  -     Misc. Devices (Bariatric Rollator) misc; 1 each Daily. Indications: back pain and needs seat to rest forwalking.  Dispense: 1 each; Refill: 0      Follow Up:  No follow-ups on file.     An After Visit Summary and PPPS were given to the patient.

## 2021-06-25 ENCOUNTER — TELEPHONE (OUTPATIENT)
Dept: FAMILY MEDICINE CLINIC | Facility: CLINIC | Age: 66
End: 2021-06-25

## 2021-06-25 ENCOUNTER — LAB (OUTPATIENT)
Dept: LAB | Facility: HOSPITAL | Age: 66
End: 2021-06-25

## 2021-06-25 DIAGNOSIS — R19.7 DIARRHEA OF PRESUMED INFECTIOUS ORIGIN: ICD-10-CM

## 2021-06-25 DIAGNOSIS — R19.7 DIARRHEA OF PRESUMED INFECTIOUS ORIGIN: Primary | ICD-10-CM

## 2021-06-25 LAB
ADV 40+41 DNA STL QL NAA+NON-PROBE: NOT DETECTED
ALBUMIN SERPL-MCNC: 4.3 G/DL (ref 3.5–5.2)
ALBUMIN/GLOB SERPL: 1.7 G/DL
ALP SERPL-CCNC: 71 U/L (ref 39–117)
ALT SERPL W P-5'-P-CCNC: 28 U/L (ref 1–33)
ANION GAP SERPL CALCULATED.3IONS-SCNC: 9.3 MMOL/L (ref 5–15)
AST SERPL-CCNC: 17 U/L (ref 1–32)
ASTRO TYP 1-8 RNA STL QL NAA+NON-PROBE: NOT DETECTED
BASOPHILS # BLD AUTO: 0.03 10*3/MM3 (ref 0–0.2)
BASOPHILS NFR BLD AUTO: 0.3 % (ref 0–1.5)
BILIRUB SERPL-MCNC: 0.4 MG/DL (ref 0–1.2)
BUN SERPL-MCNC: 16 MG/DL (ref 8–23)
BUN/CREAT SERPL: 16 (ref 7–25)
C CAYETANENSIS DNA STL QL NAA+NON-PROBE: NOT DETECTED
C COLI+JEJ+UPSA DNA STL QL NAA+NON-PROBE: NOT DETECTED
C DIFF GDH STL QL: NEGATIVE
CALCIUM SPEC-SCNC: 10.2 MG/DL (ref 8.6–10.5)
CHLORIDE SERPL-SCNC: 104 MMOL/L (ref 98–107)
CHOLEST SERPL-MCNC: 110 MG/DL (ref 0–200)
CO2 SERPL-SCNC: 29.7 MMOL/L (ref 22–29)
CREAT SERPL-MCNC: 1 MG/DL (ref 0.57–1)
CRYPTOSP DNA STL QL NAA+NON-PROBE: NOT DETECTED
DEPRECATED RDW RBC AUTO: 47.6 FL (ref 37–54)
E HISTOLYT DNA STL QL NAA+NON-PROBE: NOT DETECTED
EAEC PAA PLAS AGGR+AATA ST NAA+NON-PRB: NOT DETECTED
EC STX1+STX2 GENES STL QL NAA+NON-PROBE: NOT DETECTED
EOSINOPHIL # BLD AUTO: 0.05 10*3/MM3 (ref 0–0.4)
EOSINOPHIL NFR BLD AUTO: 0.4 % (ref 0.3–6.2)
EPEC EAE GENE STL QL NAA+NON-PROBE: NOT DETECTED
ERYTHROCYTE [DISTWIDTH] IN BLOOD BY AUTOMATED COUNT: 13.2 % (ref 12.3–15.4)
ETEC LTA+ST1A+ST1B TOX ST NAA+NON-PROBE: NOT DETECTED
G LAMBLIA DNA STL QL NAA+NON-PROBE: NOT DETECTED
GFR SERPL CREATININE-BSD FRML MDRD: 56 ML/MIN/1.73
GLOBULIN UR ELPH-MCNC: 2.5 GM/DL
GLUCOSE SERPL-MCNC: 98 MG/DL (ref 65–99)
HBA1C MFR BLD: 5.5 % (ref 3.5–5.6)
HCT VFR BLD AUTO: 41 % (ref 34–46.6)
HDLC SERPL-MCNC: 35 MG/DL (ref 40–60)
HGB BLD-MCNC: 13.5 G/DL (ref 12–15.9)
IMM GRANULOCYTES # BLD AUTO: 0.1 10*3/MM3 (ref 0–0.05)
IMM GRANULOCYTES NFR BLD AUTO: 0.8 % (ref 0–0.5)
LACTOFERRIN STL QL LA: POSITIVE
LDLC SERPL CALC-MCNC: 47 MG/DL (ref 0–100)
LDLC/HDLC SERPL: 1.19 {RATIO}
LYMPHOCYTES # BLD AUTO: 1.68 10*3/MM3 (ref 0.7–3.1)
LYMPHOCYTES NFR BLD AUTO: 14.3 % (ref 19.6–45.3)
MCH RBC QN AUTO: 31.6 PG (ref 26.6–33)
MCHC RBC AUTO-ENTMCNC: 32.9 G/DL (ref 31.5–35.7)
MCV RBC AUTO: 96 FL (ref 79–97)
MONOCYTES # BLD AUTO: 0.96 10*3/MM3 (ref 0.1–0.9)
MONOCYTES NFR BLD AUTO: 8.1 % (ref 5–12)
NEUTROPHILS NFR BLD AUTO: 76.1 % (ref 42.7–76)
NEUTROPHILS NFR BLD AUTO: 8.96 10*3/MM3 (ref 1.7–7)
NOROVIRUS GI+II RNA STL QL NAA+NON-PROBE: NOT DETECTED
NRBC BLD AUTO-RTO: 0 /100 WBC (ref 0–0.2)
P SHIGELLOIDES DNA STL QL NAA+NON-PROBE: NOT DETECTED
PLATELET # BLD AUTO: 369 10*3/MM3 (ref 140–450)
PMV BLD AUTO: 10.6 FL (ref 6–12)
POTASSIUM SERPL-SCNC: 4.7 MMOL/L (ref 3.5–5.2)
PROT SERPL-MCNC: 6.8 G/DL (ref 6–8.5)
RBC # BLD AUTO: 4.27 10*6/MM3 (ref 3.77–5.28)
RVA RNA STL QL NAA+NON-PROBE: NOT DETECTED
S ENT+BONG DNA STL QL NAA+NON-PROBE: NOT DETECTED
SAPO I+II+IV+V RNA STL QL NAA+NON-PROBE: NOT DETECTED
SHIGELLA SP+EIEC IPAH ST NAA+NON-PROBE: NOT DETECTED
SODIUM SERPL-SCNC: 143 MMOL/L (ref 136–145)
TRIGL SERPL-MCNC: 167 MG/DL (ref 0–150)
TSH SERPL DL<=0.05 MIU/L-ACNC: 1.11 UIU/ML (ref 0.27–4.2)
V CHOL+PARA+VUL DNA STL QL NAA+NON-PROBE: NOT DETECTED
V CHOLERAE DNA STL QL NAA+NON-PROBE: NOT DETECTED
VLDLC SERPL-MCNC: 28 MG/DL (ref 5–40)
WBC # BLD AUTO: 11.78 10*3/MM3 (ref 3.4–10.8)
Y ENTEROCOL DNA STL QL NAA+NON-PROBE: NOT DETECTED

## 2021-06-25 PROCEDURE — 84443 ASSAY THYROID STIM HORMONE: CPT | Performed by: PREVENTIVE MEDICINE

## 2021-06-25 PROCEDURE — 83630 LACTOFERRIN FECAL (QUAL): CPT | Performed by: PREVENTIVE MEDICINE

## 2021-06-25 PROCEDURE — 80061 LIPID PANEL: CPT | Performed by: PREVENTIVE MEDICINE

## 2021-06-25 PROCEDURE — 0097U HC BIOFIRE FILMARRAY GI PANEL: CPT | Performed by: PREVENTIVE MEDICINE

## 2021-06-25 PROCEDURE — 83036 HEMOGLOBIN GLYCOSYLATED A1C: CPT | Performed by: PREVENTIVE MEDICINE

## 2021-06-25 PROCEDURE — 87449 NOS EACH ORGANISM AG IA: CPT

## 2021-06-25 PROCEDURE — 80053 COMPREHEN METABOLIC PANEL: CPT | Performed by: PREVENTIVE MEDICINE

## 2021-06-25 PROCEDURE — 87324 CLOSTRIDIUM AG IA: CPT

## 2021-06-25 PROCEDURE — 85025 COMPLETE CBC W/AUTO DIFF WBC: CPT | Performed by: PREVENTIVE MEDICINE

## 2021-06-25 RX ORDER — METRONIDAZOLE 500 MG/1
500 TABLET ORAL 3 TIMES DAILY
Qty: 30 TABLET | Refills: 0 | Status: SHIPPED | OUTPATIENT
Start: 2021-06-25 | End: 2021-10-27

## 2021-06-25 RX ORDER — METRONIDAZOLE 500 MG/1
500 TABLET ORAL 3 TIMES DAILY
Qty: 30 TABLET | Refills: 0 | Status: SHIPPED | OUTPATIENT
Start: 2021-06-25 | End: 2021-06-25 | Stop reason: SDUPTHER

## 2021-06-25 NOTE — TELEPHONE ENCOUNTER
----- Message from Ban Alexander MD sent at 6/25/2021  2:38 PM EDT -----  C. Difficile was negative.  Hope diarrhea is improving-if not should get CT abdomen and Followup [facility]-let me know

## 2021-06-25 NOTE — PROGRESS NOTES
C. Difficile was negative.  Hope diarrhea is improving-if not should get CT abdomen and Followup [facility]-let me know

## 2021-06-25 NOTE — TELEPHONE ENCOUNTER
Caller: Sherrie Chand    Relationship to patient: Self    Best call back number: [phone]    Patient is needing: PATIENT IS CALLING IN REGARDS TO HER MEDICATION FLAGYL BEING SENT TO THE WRONG PHARMACY. SHE IS NEEDING IT TO BE SENT TO       [facility]/pharmacy #[id number] - [place], [place] - [address] [facility] [phone] [facility] [phone] FX

## 2021-06-25 NOTE — PROGRESS NOTES
Lactoferrin and WBC both elevated so I advise the Flagyl is indicated even though I don't have bacteria identified-looks bacterial.  Call next Tuesday to report progress.  ER if worsens or gets dehydrated

## 2021-06-25 NOTE — TELEPHONE ENCOUNTER
EVA called and said that there was an issue with [facility] filling this med for pt- because it had already been shipped out by mail order. I called [facility] locally, and spoke with Pharmacist Noreen.She said that she is going to call Pt's insurance company and try to get an override to be able to fill this Rx for pt so she can go ahead and get the Rx started because she is miserable. Mail order will take 5-6 days to arrive. Noreen ( Pharmacist) said that she will call the pt with an update once she is able to talk with insurance company. I called and updated pt with all of this info.

## 2021-06-25 NOTE — TELEPHONE ENCOUNTER
I called and spoke with pt. She said that the diarrhea hasn't improved she is still going 3-4 times in the morning. I advised of recommendation of CT and follow up with [facility]- She verbalized understanding.

## 2021-06-28 ENCOUNTER — TELEPHONE (OUTPATIENT)
Dept: FAMILY MEDICINE CLINIC | Facility: CLINIC | Age: 66
End: 2021-06-28

## 2021-06-28 NOTE — TELEPHONE ENCOUNTER
I spoke with Son in regards this this request. When I called [facility] in [place], [place]- They do not have anyone in today that is able to help with this request. The person that handles it is out of the office on vacation until July 6th.     Ej said that he is going to call around to other pharmacy/DME suppliers in [place], and will call me back with the address/ phone / Fax of where he is wanting the order sent.

## 2021-06-28 NOTE — TELEPHONE ENCOUNTER
Caller: JAZZ COPE    Relationship to patient: Child    Best call back number:[phone]    Patient is needing:PATIENT CALLED IN STATING THAT THE PATIENT HAD AN ORDER FOR A WALKER SENT TO HER PHARMACY. PHARMACY REJECTED THE ORDER AND STATED IT NEEDED TO BE SENT TO A DME PROVIDER. PATIENT IS GOING TO HER SONS FOR A  THIS AFTERNOON IN [place]. PATIENTS SON STATED THAT THEY WOULD LIKE THE ORDER SENT IN TO THE DME PROVIDER LISTED BELOW. STATED THAT HER INSURANCE WILL COVER. PLEASE ADVISE. PATIENTS SON STATED THAT HE WOULD LIKE TO  BY THE END OF DAY TODAY IF POSSIBLE. PLEASE ADVISE.     [facility]- [phone]  [address] [place] [id number]

## 2021-06-28 NOTE — TELEPHONE ENCOUNTER
HUB TO READ    ----- Message from Ban Alexander MD sent at 6/25/2021  4:31 PM EDT -----  Lactoferrin and WBC both elevated so I advise the Flagyl is indicated even though I don't have bacteria identified-looks bacterial.  Call next Tuesday to report progress.  ER if worsens or gets dehydrated

## 2021-06-28 NOTE — TELEPHONE ENCOUNTER
I spoke with pt's Son, (Was talking to him about the walker- that was being addressed in different message). He was given these results. Verbalized understanding, and said that he would relay info to Mom. He said that he spoke with her this morning, and she has made great improvements since being able to  the Rx.

## 2021-06-30 ENCOUNTER — OFFICE VISIT (OUTPATIENT)
Dept: PAIN MEDICINE | Facility: CLINIC | Age: 66
End: 2021-06-30

## 2021-06-30 VITALS
SYSTOLIC BLOOD PRESSURE: 109 MMHG | HEIGHT: 63 IN | BODY MASS INDEX: 40.22 KG/M2 | DIASTOLIC BLOOD PRESSURE: 73 MMHG | OXYGEN SATURATION: 96 % | HEART RATE: 90 BPM | WEIGHT: 227 LBS | RESPIRATION RATE: 16 BRPM

## 2021-06-30 DIAGNOSIS — Z79.899 HIGH RISK MEDICATION USE: Primary | ICD-10-CM

## 2021-06-30 DIAGNOSIS — M25.50 POLYARTHRALGIA: ICD-10-CM

## 2021-06-30 DIAGNOSIS — Z79.899 HIGH RISK MEDICATION USE: ICD-10-CM

## 2021-06-30 DIAGNOSIS — G89.4 CHRONIC PAIN SYNDROME: Primary | ICD-10-CM

## 2021-06-30 DIAGNOSIS — M47.817 LUMBOSACRAL SPONDYLOSIS WITHOUT MYELOPATHY: ICD-10-CM

## 2021-06-30 PROCEDURE — 99214 OFFICE O/P EST MOD 30 MIN: CPT | Performed by: ANESTHESIOLOGY

## 2021-06-30 RX ORDER — HYDROCODONE BITARTRATE AND ACETAMINOPHEN 7.5; 325 MG/1; MG/1
1 TABLET ORAL 3 TIMES DAILY PRN
Qty: 90 TABLET | Refills: 0 | Status: SHIPPED | OUTPATIENT
Start: 2021-08-07 | End: 2021-08-25 | Stop reason: SDUPTHER

## 2021-06-30 RX ORDER — HYDROCODONE BITARTRATE AND ACETAMINOPHEN 7.5; 325 MG/1; MG/1
1 TABLET ORAL 3 TIMES DAILY PRN
Qty: 90 TABLET | Refills: 0 | Status: SHIPPED | OUTPATIENT
Start: 2021-07-08 | End: 2021-08-25 | Stop reason: SDUPTHER

## 2021-06-30 NOTE — PROGRESS NOTES
CC  hip/buttock, right leg, lower back pain  66-year-old female with chronic back pain here for follow-up.  Excellent relief of her lower extremity radicular pain with right L4-L5 transforaminal JESSICA last visit.    Chronic back pain radiating to bilateral lower extremity worse on the left with aching numbness in both legs worse with standing walking or activity.  Denies new weakness saddle anesthesia bladder bowel incontinence.    Pain interfering with daily activity and sleep.  Marginal relief with physical therapy or anti-inflammatories.      Utilizes hydrocodone with good relief functional benefit and side effects.    Marginal relief with caudal JESSICA in the past.  Declines LESI due to past experience with post dural puncture headache.    L-spine MRI 2016: Multilevel degenerative disc disease and degenerative facet change as well as  multilevel postoperative changes detailed above.  There is a 14 mm of anterolisthesis of L4 on L5 which has worsened from the prior exam where this was only 4 mm.  This results in severe bilateral neural foraminal narrowing but no spinal canal stenosis.    Pain Assessment   Cause of Pain: surgery  Location of Pain: Lower Back, R Hip, L Hip, R Leg, L Leg  Description of Pain: Dull/Aching, Throbbing, Stabbing  Previous Pain Rating : 7  Current Pain Ratin  Aggravating Factors: Activity  Alleviating Factors: Rest, Medication  Previous Treatments: Epidural Steroids, Narcotic Pain Medication, Physical Therapy  Previous Diagnostic Studies: MRI   PEG Assessment   What number best describes your pain on average in the past week?5  What number best describes how, during the past week, pain has interfered with your enjoyment of life?5  What number best describes how, during the past week, pain has interfered with your general activity? 10     has a past medical history of Arthritis, Depression, Low back pain, Osteoporosis, and Spinal stenosis (2017).     has a past surgical history that  includes Cervical Curettage; Anterior cervical discectomy; Total vaginal hysterectomy; Appendectomy; Back surgery (2017); and Mediastinotomy for exploration / drainage / biopsy / removal Fb w/ cervical approach (2011).     Social History     Tobacco Use   • Smoking status: Former Smoker     Packs/day: 0.50     Years: 30.00     Pack years: 15.00     Types: Cigarettes     Quit date:      Years since quittin.4   • Smokeless tobacco: Never Used   Substance Use Topics   • Alcohol use: No     Review of Systems        See HPI      Vitals:    21 0855   BP: 109/73   Pulse: 90   Resp: 16   SpO2: 96%     Physical Exam  Vitals reviewed.   Constitutional:       General: She is not in acute distress.  Pulmonary:      Effort: Pulmonary effort is normal.   Musculoskeletal:      Lumbar back: Tenderness present. Positive right straight leg raise test.      Comments: Lumbar loading positive, pain on extension of low back past 5 degrees.  TTP on the lumbar facets noted.   Psychiatric:         Behavior: Behavior normal.       PHQ 9 on chart                     opioid risk tool low risk      Assessment /Plan     Diagnoses and all orders for this visit:    1. Chronic pain syndrome (Primary)  -     HYDROcodone-acetaminophen (NORCO) 7.5-325 MG per tablet; Take 1 tablet by mouth 3 (Three) Times a Day As Needed for Severe Pain .  Dispense: 90 tablet; Refill: 0  -     HYDROcodone-acetaminophen (NORCO) 7.5-325 MG per tablet; Take 1 tablet by mouth 3 (Three) Times a Day As Needed for Severe Pain . DNF before 2021  Dispense: 90 tablet; Refill: 0    2. Lumbosacral spondylosis without myelopathy    3. Polyarthralgia    4. High risk medication use      Summary   66-year-old female with chronic back pain here for follow-up.    chronic pain from lumbar DDD spondylosis with radicular pain.  Chronic polyarthralgia.  Sacroiliitis, repeat SI injection as needed    Excellent relief with right L4-L5 transforaminal JESSICA last visit.   Repeat as needed.    Continue hydrocodone 7.5/25 3 times daily.  UDS and inspect reviewed.  Discussed risk of tolerance, dependence, respiratory depression, coma and death associated with use of oral opioids for treatment of chronic nonmalignant pain.      Continue gabapentin/flexeril.  Cont. topical compounded neuropathic/anti-inflammatory cream with ketamine.      RTC 2 mo

## 2021-07-02 ENCOUNTER — TELEPHONE (OUTPATIENT)
Dept: FAMILY MEDICINE CLINIC | Facility: CLINIC | Age: 66
End: 2021-07-02

## 2021-07-02 NOTE — TELEPHONE ENCOUNTER
----- Message from Sherrie Chand sent at 7/2/2021 10:08 AM EDT -----  Regarding: Non-Urgent Medical Question  Contact: [phone]  Manuela- I checked with [facility] in [place] and they do not have an order for the Jorge Alexander prescribed for me. Their # is [phone] and they are located at [address] [place] [address]. Could you please check on this at your earliest convenience and let me know?     Thanks in advance for all your trouble   . Have a great weekend.    Sincerely - Sherrie Chand  D/O/B 1955

## 2021-07-02 NOTE — TELEPHONE ENCOUNTER
I called and notified pt. That I had sent the info last week, and that I printed off all info and refaxed it again today, and also called and spoke with someone at the [facility] in [place]. Lady that I spoke with at [facility] for whatever reason said that they didn't have anything for this pt, but was verbally advised that I had just refaxed order and OV note. I called and updated pt with this info, she verbalized understanding. She will check with them on Monday or Tuesday. She will call me back to let me know an update on Tues.

## 2021-07-06 ENCOUNTER — PATIENT MESSAGE (OUTPATIENT)
Dept: FAMILY MEDICINE CLINIC | Facility: CLINIC | Age: 66
End: 2021-07-06

## 2021-07-06 RX ORDER — CIPROFLOXACIN 500 MG/1
500 TABLET, FILM COATED ORAL 2 TIMES DAILY
Qty: 20 TABLET | Refills: 0 | Status: SHIPPED | OUTPATIENT
Start: 2021-07-06 | End: 2021-07-06 | Stop reason: SDUPTHER

## 2021-07-06 RX ORDER — CIPROFLOXACIN 500 MG/1
500 TABLET, FILM COATED ORAL 2 TIMES DAILY
Qty: 20 TABLET | Refills: 0 | Status: SHIPPED | OUTPATIENT
Start: 2021-07-06 | End: 2021-08-02 | Stop reason: SDUPTHER

## 2021-07-06 NOTE — TELEPHONE ENCOUNTER
I called [facility] pharmacy and cancelled the Rx for Cipro. It was needing to go to Local pharmacy for pt. I have reentered it with [facility] in [place]. Just needs to be signed and it will send. I will update pt.

## 2021-07-08 RX ORDER — DULOXETIN HYDROCHLORIDE 60 MG/1
CAPSULE, DELAYED RELEASE ORAL
Qty: 90 CAPSULE | Refills: 1 | Status: SHIPPED | OUTPATIENT
Start: 2021-07-08 | End: 2021-11-22

## 2021-07-08 RX ORDER — LISINOPRIL AND HYDROCHLOROTHIAZIDE 25; 20 MG/1; MG/1
TABLET ORAL
Qty: 90 TABLET | Refills: 0 | Status: SHIPPED | OUTPATIENT
Start: 2021-07-08 | End: 2021-09-08

## 2021-07-08 RX ORDER — ATORVASTATIN CALCIUM 40 MG/1
TABLET, FILM COATED ORAL
Qty: 90 TABLET | Refills: 3 | Status: SHIPPED | OUTPATIENT
Start: 2021-07-08 | End: 2022-05-28

## 2021-07-30 NOTE — TELEPHONE ENCOUNTER
[facility] is not able to get rollator walker. Switching to [facility] pharmacy in [place]. Fax [phone]

## 2021-08-02 DIAGNOSIS — R19.7 DIARRHEA OF PRESUMED INFECTIOUS ORIGIN: Primary | ICD-10-CM

## 2021-08-02 RX ORDER — CIPROFLOXACIN 500 MG/1
500 TABLET, FILM COATED ORAL 2 TIMES DAILY
Qty: 20 TABLET | Refills: 0 | Status: SHIPPED | OUTPATIENT
Start: 2021-08-02 | End: 2021-08-25

## 2021-08-13 ENCOUNTER — HOSPITAL ENCOUNTER (OUTPATIENT)
Dept: CT IMAGING | Facility: HOSPITAL | Age: 66
Discharge: HOME | End: 2021-08-13
Admitting: PREVENTIVE MEDICINE

## 2021-08-13 ENCOUNTER — TELEPHONE (OUTPATIENT)
Dept: FAMILY MEDICINE CLINIC | Facility: CLINIC | Age: 66
End: 2021-08-13

## 2021-08-13 DIAGNOSIS — R19.7 DIARRHEA OF PRESUMED INFECTIOUS ORIGIN: ICD-10-CM

## 2021-08-13 PROCEDURE — 74176 CT ABD & PELVIS W/O CONTRAST: CPT

## 2021-08-13 NOTE — TELEPHONE ENCOUNTER
HUB TO READ    ----- Message from Ban Alexander MD sent at 8/13/2021 12:30 PM EDT -----  Liver ius enlarged so advise F/U with Gastroenterology as planned.  R renal cyst 5.2 cm and L renal cyst 11mm on CT of abdomen.

## 2021-08-13 NOTE — PROGRESS NOTES
Liver ius enlarged so advise F/U with Gastroenterology as planned.  R renal cyst 5.2 cm and L renal cyst 11mm on CT of abdomen.

## 2021-08-25 ENCOUNTER — OFFICE VISIT (OUTPATIENT)
Dept: PAIN MEDICINE | Facility: CLINIC | Age: 66
End: 2021-08-25

## 2021-08-25 VITALS
HEIGHT: 63 IN | BODY MASS INDEX: 40.22 KG/M2 | DIASTOLIC BLOOD PRESSURE: 81 MMHG | HEART RATE: 97 BPM | OXYGEN SATURATION: 96 % | RESPIRATION RATE: 16 BRPM | SYSTOLIC BLOOD PRESSURE: 121 MMHG | WEIGHT: 227 LBS

## 2021-08-25 DIAGNOSIS — M54.16 LUMBAR RADICULITIS: ICD-10-CM

## 2021-08-25 DIAGNOSIS — G89.4 CHRONIC PAIN SYNDROME: Primary | ICD-10-CM

## 2021-08-25 DIAGNOSIS — M47.817 LUMBOSACRAL SPONDYLOSIS WITHOUT MYELOPATHY: ICD-10-CM

## 2021-08-25 DIAGNOSIS — M25.50 POLYARTHRALGIA: ICD-10-CM

## 2021-08-25 DIAGNOSIS — Z79.899 HIGH RISK MEDICATION USE: ICD-10-CM

## 2021-08-25 PROCEDURE — 99214 OFFICE O/P EST MOD 30 MIN: CPT | Performed by: ANESTHESIOLOGY

## 2021-08-25 RX ORDER — HYDROCODONE BITARTRATE AND ACETAMINOPHEN 7.5; 325 MG/1; MG/1
1 TABLET ORAL 3 TIMES DAILY PRN
Qty: 90 TABLET | Refills: 0 | Status: SHIPPED | OUTPATIENT
Start: 2021-10-05 | End: 2021-11-03 | Stop reason: SDUPTHER

## 2021-08-25 RX ORDER — HYDROCODONE BITARTRATE AND ACETAMINOPHEN 7.5; 325 MG/1; MG/1
1 TABLET ORAL 3 TIMES DAILY PRN
Qty: 90 TABLET | Refills: 0 | Status: SHIPPED | OUTPATIENT
Start: 2021-09-05 | End: 2021-10-15 | Stop reason: SDUPTHER

## 2021-09-08 RX ORDER — LISINOPRIL AND HYDROCHLOROTHIAZIDE 25; 20 MG/1; MG/1
TABLET ORAL
Qty: 90 TABLET | Refills: 0 | Status: SHIPPED | OUTPATIENT
Start: 2021-09-08 | End: 2021-11-22

## 2021-09-16 ENCOUNTER — OFFICE (OUTPATIENT)
Dept: URBAN - METROPOLITAN AREA CLINIC 64 | Facility: CLINIC | Age: 66
End: 2021-09-16

## 2021-09-16 VITALS
SYSTOLIC BLOOD PRESSURE: 109 MMHG | WEIGHT: 231 LBS | HEART RATE: 93 BPM | HEIGHT: 64 IN | DIASTOLIC BLOOD PRESSURE: 69 MMHG

## 2021-09-16 DIAGNOSIS — R19.7 DIARRHEA, UNSPECIFIED: ICD-10-CM

## 2021-09-16 DIAGNOSIS — R10.30 LOWER ABDOMINAL PAIN, UNSPECIFIED: ICD-10-CM

## 2021-09-16 PROCEDURE — 99204 OFFICE O/P NEW MOD 45 MIN: CPT | Performed by: NURSE PRACTITIONER

## 2021-09-16 RX ORDER — DICYCLOMINE HYDROCHLORIDE 20 MG/1
60 TABLET ORAL
Qty: 60 | Refills: 3 | Status: COMPLETED
Start: 2021-09-16 | End: 2023-02-13

## 2021-10-11 ENCOUNTER — TELEPHONE (OUTPATIENT)
Dept: FAMILY MEDICINE CLINIC | Facility: CLINIC | Age: 66
End: 2021-10-11

## 2021-10-11 NOTE — TELEPHONE ENCOUNTER
Hub to read  ----- Message from Ban Alexander MD sent at 10/11/2021  7:33 PM EDT -----  Cologuard not returned. Can help reorder or send green folder for colonoscopy

## 2021-10-15 ENCOUNTER — OFFICE VISIT (OUTPATIENT)
Dept: FAMILY MEDICINE CLINIC | Facility: CLINIC | Age: 66
End: 2021-10-15

## 2021-10-15 VITALS
OXYGEN SATURATION: 97 % | SYSTOLIC BLOOD PRESSURE: 110 MMHG | BODY MASS INDEX: 40.15 KG/M2 | HEIGHT: 63 IN | TEMPERATURE: 98.2 F | HEART RATE: 83 BPM | WEIGHT: 226.6 LBS | DIASTOLIC BLOOD PRESSURE: 65 MMHG

## 2021-10-15 DIAGNOSIS — R30.0 DYSURIA: Primary | ICD-10-CM

## 2021-10-15 DIAGNOSIS — M25.562 ACUTE PAIN OF LEFT KNEE: ICD-10-CM

## 2021-10-15 LAB
BILIRUB BLD-MCNC: NEGATIVE MG/DL
CLARITY, POC: CLEAR
COLOR UR: YELLOW
EXPIRATION DATE: NORMAL
GLUCOSE UR STRIP-MCNC: NEGATIVE MG/DL
KETONES UR QL: NEGATIVE
LEUKOCYTE EST, POC: NEGATIVE
Lab: NORMAL
NITRITE UR-MCNC: NEGATIVE MG/ML
PH UR: 6.5 [PH] (ref 5–8)
PROT UR STRIP-MCNC: NEGATIVE MG/DL
RBC # UR STRIP: NEGATIVE /UL
SP GR UR: 1.02 (ref 1–1.03)
UROBILINOGEN UR QL: NORMAL

## 2021-10-15 PROCEDURE — 81003 URINALYSIS AUTO W/O SCOPE: CPT | Performed by: PREVENTIVE MEDICINE

## 2021-10-15 PROCEDURE — 99214 OFFICE O/P EST MOD 30 MIN: CPT | Performed by: PREVENTIVE MEDICINE

## 2021-10-15 RX ORDER — PHENAZOPYRIDINE HYDROCHLORIDE 200 MG/1
200 TABLET, FILM COATED ORAL 3 TIMES DAILY PRN
Qty: 21 TABLET | Refills: 0 | Status: SHIPPED | OUTPATIENT
Start: 2021-10-15 | End: 2021-10-27

## 2021-10-15 RX ORDER — AMOXICILLIN 875 MG/1
875 TABLET, COATED ORAL 2 TIMES DAILY
Qty: 20 TABLET | Refills: 0 | Status: SHIPPED | OUTPATIENT
Start: 2021-10-15 | End: 2021-10-27

## 2021-10-15 RX ORDER — DICYCLOMINE HCL 20 MG
TABLET ORAL
COMMUNITY
Start: 2021-09-17 | End: 2022-03-03

## 2021-10-15 NOTE — PROGRESS NOTES
"Subjective   Sherrie Chand is a 66 y.o. female presents for   Chief Complaint   Patient presents with   • Spasms     bladder   • Knee Pain     Patient presents today for bladder spasms that started when she got what she feels certain was a urinary tract infection she started on her 's amoxicillin but is still needing to replace his supply and we will give her Pyridium to use instead of using someone else's antibiotics in the future she knows that she still needs to come in to get treated for the discomfort as well as the infection.  Culture will be obtained 3 days after stopping the medicine this time.  Acute left knee pain medially after awakening 2 days ago no known injury.  She has some mild swelling no definite locking but some instability.  Point tenderness over the anserine bursa does have full extension flexion lacks a few degrees.  Extension was strong against resistance with test Salaiz was positive so we will refer her to the orthopedist.  In the meantime we will stop her sulindac give her 7 days of meloxicam to see if we can offer her some relief she will be careful and use a cane until she gets the need to have problems taken care of and will ice the anserine bursa and padded at nighttime.  Health Maintenance Due   Topic Date Due   • COLORECTAL CANCER SCREENING  Never done   • TDAP/TD VACCINES (1 - Tdap) Never done   • ZOSTER VACCINE (1 of 2) Never done   • INFLUENZA VACCINE  08/01/2021       History of Present Illness     Vitals:    10/15/21 1543 10/15/21 1547   BP: 103/68 110/65   BP Location: Left arm Right arm   Patient Position: Sitting Sitting   Cuff Size: Adult Adult   Pulse: 83    Temp: 98.2 °F (36.8 °C)    SpO2: 97%    Weight: 103 kg (226 lb 9.6 oz)    Height: 160 cm (62.99\")      Body mass index is 40.15 kg/m².    Current Outpatient Medications on File Prior to Visit   Medication Sig Dispense Refill   • atorvastatin (LIPITOR) 40 MG tablet TAKE 1 TABLET EVERY NIGHT 90 tablet 3   • " Calcium Carbonate-Vitamin D 600-400 MG-UNIT chewable tablet Chew 1 tablet Daily.     • cyclobenzaprine (FLEXERIL) 10 MG tablet TAKE 1 TABLET BY MOUTH 3 (THREE) TIMES A DAY AS NEEDED FOR MUSCLE SPASMS. 90 tablet 11   • DULoxetine (CYMBALTA) 60 MG capsule TAKE 1 CAPSULE EVERY DAY 90 capsule 1   • gabapentin (NEURONTIN) 800 MG tablet Take 1 tablet by mouth 3 (Three) Times a Day. 90 tablet 11   • HYDROcodone-acetaminophen (NORCO) 7.5-325 MG per tablet Take 1 tablet by mouth 3 (Three) Times a Day As Needed for Severe Pain . DNF before 10/5/2021 90 tablet 0   • lisinopril-hydrochlorothiazide (PRINZIDE,ZESTORETIC) 20-25 MG per tablet TAKE 1 TABLET EVERY DAY 90 tablet 0   • St Johns Wort 150 MG capsule Take 1 capsule by mouth Daily.     • vitamin B-6 (PYRIDOXINE) 100 MG tablet Take 300 mg by mouth Daily.     • [DISCONTINUED] sulindac (CLINORIL) 200 MG tablet TAKE 1 TABLET EVERY DAY 90 tablet 3   • dicyclomine (BENTYL) 20 MG tablet      • metroNIDAZOLE (Flagyl) 500 MG tablet Take 1 tablet by mouth 3 (Three) Times a Day. EMERGENCY RX. Need ASAP 30 tablet 0   • Misc. Devices (Bariatric Rollator) misc 1 each Daily. Indications: back pain and needs seat to rest forwalking. 1 each 0   • [DISCONTINUED] HYDROcodone-acetaminophen (NORCO) 7.5-325 MG per tablet Take 1 tablet by mouth 3 (Three) Times a Day As Needed for Severe Pain . DNF before 9/5/2021 90 tablet 0     No current facility-administered medications on file prior to visit.       The following portions of the patient's history were reviewed and updated as appropriate: allergies, current medications, past family history, past medical history, past social history, past surgical history and problem list.    Review of Systems   Genitourinary: Positive for difficulty urinating and dysuria.   Musculoskeletal: Positive for gait problem, joint swelling and bursitis.       Objective   Physical Exam  Cardiovascular:      Rate and Rhythm: Normal rate and regular rhythm.      Heart  sounds: Normal heart sounds.   Pulmonary:      Effort: Pulmonary effort is normal.      Comments: Decreased breath sounds bilaterally  Abdominal:      General: Abdomen is flat. Bowel sounds are normal.      Palpations: Abdomen is soft. There is no mass.      Tenderness: There is no abdominal tenderness. There is no right CVA tenderness or left CVA tenderness.   Musculoskeletal:         General: Swelling and tenderness present. No signs of injury.       PHQ-9 Total Score:      Assessment/Plan   Diagnoses and all orders for this visit:    1. Dysuria (Primary)  Comments:  Patient took 3 days worth of her 's penicillin for her UTI note fever chills or flank pain.  We will continue penicillin await culture 3 days off medicin  Orders:  -     POCT urinalysis dipstick, automated  -     amoxicillin (AMOXIL) 875 MG tablet; Take 1 tablet by mouth 2 (Two) Times a Day.  Dispense: 20 tablet; Refill: 0  -     phenazopyridine (Pyridium) 200 MG tablet; Take 1 tablet by mouth 3 (Three) Times a Day As Needed for Bladder Spasms.  Dispense: 21 tablet; Refill: 0  -     Urine Culture - Urine, Urine, Clean Catch; Future    2. Acute pain of left knee  Comments:  2 days ago patient awoke with pain in her left medial knee.  Discomfort over her anserine bursa Thessaly positive will refer to Ortho.  Meloxicam  sulindac stop  Orders:  -     Ambulatory Referral to Orthopedic Surgery  -     Comprehensive Metabolic Panel; Future    Other orders  -     Meloxicam 15 MG tablet dispersible; Place 15 mg on the tongue Daily.  Dispense: 7 tablet; Refill: 0        Patient Instructions   Get flu shot and Moderna booster.

## 2021-10-27 ENCOUNTER — OFFICE VISIT (OUTPATIENT)
Dept: ORTHOPEDIC SURGERY | Facility: CLINIC | Age: 66
End: 2021-10-27

## 2021-10-27 VITALS
BODY MASS INDEX: 40.4 KG/M2 | HEART RATE: 90 BPM | SYSTOLIC BLOOD PRESSURE: 111 MMHG | WEIGHT: 228 LBS | HEIGHT: 63 IN | DIASTOLIC BLOOD PRESSURE: 71 MMHG

## 2021-10-27 DIAGNOSIS — M25.562 ACUTE PAIN OF LEFT KNEE: Primary | ICD-10-CM

## 2021-10-27 DIAGNOSIS — M17.0 PRIMARY OSTEOARTHRITIS OF KNEES, BILATERAL: ICD-10-CM

## 2021-10-27 PROCEDURE — 99203 OFFICE O/P NEW LOW 30 MIN: CPT | Performed by: FAMILY MEDICINE

## 2021-10-27 RX ORDER — MELOXICAM 15 MG/1
15 TABLET ORAL DAILY PRN
Qty: 30 TABLET | Refills: 4 | Status: SHIPPED | OUTPATIENT
Start: 2021-10-27 | End: 2022-03-03

## 2021-10-29 ENCOUNTER — PATIENT ROUNDING (BHMG ONLY) (OUTPATIENT)
Dept: ORTHOPEDIC SURGERY | Facility: CLINIC | Age: 66
End: 2021-10-29

## 2021-10-29 NOTE — PROGRESS NOTES
October 29, 2021    Hello, may I speak with Sherrie Chand?    My name is Gloria      I am  with [facility]  [address] [place] [address].    Before we get started may I verify your date of birth? 1955    I am calling to officially welcome you to our practice and ask about your recent visit. Is this a good time to talk? yes    Tell me about your visit with us. What things went well?  Doctor was very nice and so was staff, feel that I was taken care of well.       We're always looking for ways to make our patients' experiences even better. Do you have recommendations on ways we may improve?  no    Overall were you satisfied with your first visit to our practice? yes       I appreciate you taking the time to speak with me today. Is there anything else I can do for you? no      Thank you, and have a great day.

## 2021-11-03 ENCOUNTER — OFFICE VISIT (OUTPATIENT)
Dept: PAIN MEDICINE | Facility: CLINIC | Age: 66
End: 2021-11-03

## 2021-11-03 VITALS
HEIGHT: 63 IN | WEIGHT: 228 LBS | DIASTOLIC BLOOD PRESSURE: 64 MMHG | HEART RATE: 93 BPM | RESPIRATION RATE: 16 BRPM | OXYGEN SATURATION: 96 % | SYSTOLIC BLOOD PRESSURE: 119 MMHG | BODY MASS INDEX: 40.4 KG/M2

## 2021-11-03 DIAGNOSIS — M96.1 POSTLAMINECTOMY SYNDROME OF LUMBAR REGION: ICD-10-CM

## 2021-11-03 DIAGNOSIS — G89.4 CHRONIC PAIN SYNDROME: ICD-10-CM

## 2021-11-03 DIAGNOSIS — M25.50 POLYARTHRALGIA: ICD-10-CM

## 2021-11-03 DIAGNOSIS — M47.817 LUMBOSACRAL SPONDYLOSIS WITHOUT MYELOPATHY: ICD-10-CM

## 2021-11-03 DIAGNOSIS — Z79.899 HIGH RISK MEDICATION USE: Primary | ICD-10-CM

## 2021-11-03 PROCEDURE — 99214 OFFICE O/P EST MOD 30 MIN: CPT | Performed by: ANESTHESIOLOGY

## 2021-11-03 RX ORDER — HYDROCODONE BITARTRATE AND ACETAMINOPHEN 7.5; 325 MG/1; MG/1
1 TABLET ORAL 3 TIMES DAILY PRN
Qty: 90 TABLET | Refills: 0 | Status: SHIPPED | OUTPATIENT
Start: 2021-11-04 | End: 2022-01-04 | Stop reason: SDUPTHER

## 2021-11-03 RX ORDER — HYDROCODONE BITARTRATE AND ACETAMINOPHEN 7.5; 325 MG/1; MG/1
1 TABLET ORAL 3 TIMES DAILY PRN
Qty: 90 TABLET | Refills: 0 | Status: SHIPPED | OUTPATIENT
Start: 2021-12-04 | End: 2022-01-04 | Stop reason: SDUPTHER

## 2021-11-03 NOTE — PROGRESS NOTES
CC  hip/buttock, right leg, lower back pain  66-year-old female with chronic back pain/postlaminectomy syndrome history of fusion L2 to to S1, here for follow-up.  Slight worsening in back pain however declines injections at this time.  Having good relief of hydrocodone.  Chronic back pain radiating to bilateral lower extremity worse on the left with aching numbness in both legs worse with standing walking or activity.  Denies new weakness saddle anesthesia bladder bowel incontinence.    Pain interfering with daily activity and sleep.  Marginal relief with physical therapy or anti-inflammatories.  Marginal relief with caudal JESSICA in the past.  Declines LESI due to past experience with post dural puncture headache.  Mild relief with transforaminal JESSICA.      Utilizes hydrocodone with good relief functional benefit and side effects.      CT abdomen pelvis : L2-S1 posterior spinal fusion hardware appears intact. L4 lobectomy with interbody strut is in place. Advanced degenerative disc endplate changes  are present at the L1-2 level. No acute osseous abnormalities are identified.  L-spine MRI 2016: Multilevel degenerative disc disease and degenerative facet change as well as  multilevel postoperative changes detailed above.  There is a 14 mm of anterolisthesis of L4 on L5 which has worsened from the prior exam where this was only 4 mm.  This results in severe bilateral neural foraminal narrowing but no spinal canal stenosis.    Pain Assessment   Cause of Pain: surgery  Location of Pain: Lower Back, R Hip, L Hip, R Leg, L Leg  Description of Pain: Dull/Aching, Throbbing, Stabbing  Previous Pain Rating : 8  Current Pain Ratin  Aggravating Factors: Activity  Alleviating Factors: Rest, Medication  Previous Treatments: Epidural Steroids, Narcotic Pain Medication, Physical Therapy  Previous Diagnostic Studies: MRI   PEG Assessment   What number best describes your pain on average in the past week?7  What number best  describes how, during the past week, pain has interfered with your enjoyment of life?6  What number best describes how, during the past week, pain has interfered with your general activity? 9     has a past medical history of Arthritis, Depression, Low back pain, Osteoporosis, and Spinal stenosis (2017).     has a past surgical history that includes Cervical Curettage; Anterior cervical discectomy; Total vaginal hysterectomy; Appendectomy; Back surgery (); and Mediastinotomy for exploration / drainage / biopsy / removal Fb w/ cervical approach (2011).     Social History     Tobacco Use   • Smoking status: Former Smoker     Packs/day: 0.50     Years: 30.00     Pack years: 15.00     Types: Cigarettes     Quit date:      Years since quittin.8   • Smokeless tobacco: Never Used   Substance Use Topics   • Alcohol use: No     Review of Systems        See HPI      Vitals:    21 0831   BP: 119/64   Pulse: 93   Resp: 16   SpO2: 96%     Physical Exam  Vitals reviewed.   Constitutional:       General: She is not in acute distress.  Pulmonary:      Effort: Pulmonary effort is normal.   Musculoskeletal:      Lumbar back: Tenderness present. Positive right straight leg raise test.      Comments: Lumbar loading positive, pain on extension of low back past 5 degrees.  TTP on the lumbar facets noted.       PHQ 9 on chart                     opioid risk tool low risk      Assessment /Plan     Diagnoses and all orders for this visit:    1. Chronic pain syndrome (Primary)  -     HYDROcodone-acetaminophen (NORCO) 7.5-325 MG per tablet; Take 1 tablet by mouth 3 (Three) Times a Day As Needed for Severe Pain .  Dispense: 90 tablet; Refill: 0  -     HYDROcodone-acetaminophen (NORCO) 7.5-325 MG per tablet; Take 1 tablet by mouth 3 (Three) Times a Day As Needed for Severe Pain . DNF before 2021  Dispense: 90 tablet; Refill: 0    2. Postlaminectomy syndrome of lumbar region    3. Lumbosacral spondylosis without  myelopathy    4. Polyarthralgia    5. High risk medication use      Summary   66-year-old female with chronic back pain here for follow-up.    chronic pain from lumbar DDD spondylosis with radicular pain.  Chronic polyarthralgia.  Repeat right L4-L5 transforaminal JESSICA or SI injection as needed.    Slight worsening in back pain however declines injections at this time.  Having good relief of hydrocodone.  Recent CT abdomen pelvis showing postoperative changes at L2 and S1 and worsening spondylosis at L1-L2.    Continue hydrocodone 7.5/25 3 times daily.  UDS and inspect reviewed.  Discussed risk of tolerance, dependence, respiratory depression, coma and death associated with use of oral opioids for treatment of chronic nonmalignant pain.      Continue gabapentin/flexeril.  Cont. topical compounded neuropathic/anti-inflammatory cream with ketamine.      RTC 2 mo

## 2021-11-10 ENCOUNTER — HOSPITAL ENCOUNTER (OUTPATIENT)
Dept: CT IMAGING | Facility: HOSPITAL | Age: 66
Discharge: HOME | End: 2021-11-10
Admitting: INTERNAL MEDICINE

## 2021-11-10 DIAGNOSIS — R91.1 LUNG NODULE: ICD-10-CM

## 2021-11-10 PROCEDURE — 71250 CT THORAX DX C-: CPT

## 2021-11-22 RX ORDER — LISINOPRIL AND HYDROCHLOROTHIAZIDE 25; 20 MG/1; MG/1
TABLET ORAL
Qty: 90 TABLET | Refills: 0 | Status: SHIPPED | OUTPATIENT
Start: 2021-11-22 | End: 2022-01-14

## 2021-11-22 RX ORDER — DULOXETIN HYDROCHLORIDE 60 MG/1
CAPSULE, DELAYED RELEASE ORAL
Qty: 90 CAPSULE | Refills: 1 | Status: SHIPPED | OUTPATIENT
Start: 2021-11-22 | End: 2022-05-11

## 2021-11-30 ENCOUNTER — HOSPITAL ENCOUNTER (OUTPATIENT)
Dept: ULTRASOUND IMAGING | Facility: HOSPITAL | Age: 66
Discharge: HOME | End: 2021-11-30

## 2021-11-30 ENCOUNTER — APPOINTMENT (OUTPATIENT)
Dept: OTHER | Facility: HOSPITAL | Age: 66
End: 2021-11-30

## 2021-11-30 DIAGNOSIS — E04.2 MULTIPLE THYROID NODULES: ICD-10-CM

## 2021-11-30 DIAGNOSIS — Z09 FOLLOW UP: ICD-10-CM

## 2021-11-30 PROCEDURE — 76536 US EXAM OF HEAD AND NECK: CPT

## 2021-12-04 DIAGNOSIS — G89.4 CHRONIC PAIN SYNDROME: ICD-10-CM

## 2021-12-06 RX ORDER — GABAPENTIN 800 MG/1
TABLET ORAL
Qty: 90 TABLET | Refills: 11 | Status: SHIPPED | OUTPATIENT
Start: 2021-12-06 | End: 2022-11-22

## 2021-12-24 PROBLEM — R91.1 LUNG NODULE: Status: RESOLVED | Noted: 2021-03-09 | Resolved: 2021-12-24

## 2021-12-24 NOTE — PATIENT INSTRUCTIONS
Health Maintenance Due   Topic Date Due   • COLORECTAL CANCER SCREENING  Never done   • TDAP/TD VACCINES (1 - Tdap) Never done   • ZOSTER VACCINE (1 of 2) Never done   • INFLUENZA VACCINE  08/01/2021   • COVID-19 Vaccine (3 - Booster for Moderna series) 10/24/2021   • DXA SCAN  10/31/2021     12 hour fast for labs

## 2021-12-27 ENCOUNTER — OFFICE VISIT (OUTPATIENT)
Dept: FAMILY MEDICINE CLINIC | Facility: CLINIC | Age: 66
End: 2021-12-27

## 2021-12-27 VITALS
BODY MASS INDEX: 40.43 KG/M2 | HEART RATE: 95 BPM | WEIGHT: 228.2 LBS | DIASTOLIC BLOOD PRESSURE: 59 MMHG | TEMPERATURE: 98.2 F | OXYGEN SATURATION: 93 % | HEIGHT: 63 IN | SYSTOLIC BLOOD PRESSURE: 109 MMHG

## 2021-12-27 DIAGNOSIS — E55.9 VITAMIN D DEFICIENCY: ICD-10-CM

## 2021-12-27 DIAGNOSIS — R19.7 DIARRHEA OF PRESUMED INFECTIOUS ORIGIN: ICD-10-CM

## 2021-12-27 DIAGNOSIS — R73.9 HYPERGLYCEMIA: ICD-10-CM

## 2021-12-27 DIAGNOSIS — E66.01 CLASS 3 SEVERE OBESITY DUE TO EXCESS CALORIES WITH SERIOUS COMORBIDITY AND BODY MASS INDEX (BMI) OF 40.0 TO 44.9 IN ADULT (HCC): ICD-10-CM

## 2021-12-27 DIAGNOSIS — E04.2 MULTIPLE THYROID NODULES: ICD-10-CM

## 2021-12-27 DIAGNOSIS — E53.8 B12 DEFICIENCY: ICD-10-CM

## 2021-12-27 DIAGNOSIS — Z78.0 POST-MENOPAUSAL: Primary | ICD-10-CM

## 2021-12-27 DIAGNOSIS — I10 PRIMARY HYPERTENSION: ICD-10-CM

## 2021-12-27 DIAGNOSIS — Z12.11 SCREENING FOR COLON CANCER: ICD-10-CM

## 2021-12-27 DIAGNOSIS — F32.A DEPRESSION, UNSPECIFIED DEPRESSION TYPE: ICD-10-CM

## 2021-12-27 DIAGNOSIS — E78.5 HYPERLIPIDEMIA, UNSPECIFIED HYPERLIPIDEMIA TYPE: ICD-10-CM

## 2021-12-27 PROCEDURE — 99214 OFFICE O/P EST MOD 30 MIN: CPT | Performed by: PREVENTIVE MEDICINE

## 2021-12-27 NOTE — PROGRESS NOTES
"Subjective   Sherrie Chand is a 66 y.o. female presents for   Chief Complaint   Patient presents with   • Follow-up     6 month follow up no concerns   Patient presents today for follow-up of multiple chronic health conditions most of which are stable she is not to walk and has found that her weight as well as cholesterol and carb intake have been improving.  Depression is controlled she is up-to-date on her Covid and flu vaccinations.    Health Maintenance Due   Topic Date Due   • COLORECTAL CANCER SCREENING  Never done   • TDAP/TD VACCINES (1 - Tdap) Never done   • ZOSTER VACCINE (1 of 2) Never done   • INFLUENZA VACCINE  08/01/2021   • COVID-19 Vaccine (3 - Booster for Moderna series) 10/24/2021   • DXA SCAN  10/31/2021       History of Present Illness     Vitals:    12/27/21 0811 12/27/21 0813 12/27/21 0814   BP: 112/72 102/71 109/59   BP Location: Right arm Left arm Left arm   Patient Position: Sitting Sitting Standing   Cuff Size: Large Adult Large Adult    Pulse: 95     Temp: 98.2 °F (36.8 °C)     TempSrc: Temporal     SpO2: 93%     Weight: 104 kg (228 lb 3.2 oz)     Height: 160 cm (63\")       Body mass index is 40.42 kg/m².    Current Outpatient Medications on File Prior to Visit   Medication Sig Dispense Refill   • atorvastatin (LIPITOR) 40 MG tablet TAKE 1 TABLET EVERY NIGHT 90 tablet 3   • Calcium Carbonate-Vitamin D 600-400 MG-UNIT chewable tablet Chew 1 tablet Daily.     • cyclobenzaprine (FLEXERIL) 10 MG tablet TAKE 1 TABLET BY MOUTH 3 (THREE) TIMES A DAY AS NEEDED FOR MUSCLE SPASMS. 90 tablet 11   • dicyclomine (BENTYL) 20 MG tablet      • DULoxetine (CYMBALTA) 60 MG capsule TAKE 1 CAPSULE EVERY DAY 90 capsule 1   • gabapentin (NEURONTIN) 800 MG tablet TAKE 1 TABLET BY MOUTH 3 TIMES A DAY 90 tablet 11   • HYDROcodone-acetaminophen (NORCO) 7.5-325 MG per tablet Take 1 tablet by mouth 3 (Three) Times a Day As Needed for Severe Pain . 90 tablet 0   • HYDROcodone-acetaminophen (NORCO) 7.5-325 MG per " tablet Take 1 tablet by mouth 3 (Three) Times a Day As Needed for Severe Pain . DNF before 12/4/2021 90 tablet 0   • lisinopril-hydrochlorothiazide (PRINZIDE,ZESTORETIC) 20-25 MG per tablet TAKE 1 TABLET EVERY DAY 90 tablet 0   • meloxicam (MOBIC) 15 MG tablet Take 1 tablet by mouth Daily As Needed for Mild Pain . 30 tablet 4   • Misc. Devices (Bariatric Rollator) misc 1 each Daily. Indications: back pain and needs seat to rest forwalking. 1 each 0   • St Johns Wort 150 MG capsule Take 1 capsule by mouth Daily.     • vitamin B-6 (PYRIDOXINE) 100 MG tablet Take 300 mg by mouth Daily.       No current facility-administered medications on file prior to visit.       The following portions of the patient's history were reviewed and updated as appropriate: allergies, current medications, past family history, past medical history, past social history, past surgical history and problem list.    Review of Systems   Psychiatric/Behavioral: Positive for depressed mood.       Objective   Physical Exam  Vitals reviewed.   Constitutional:       General: She is not in acute distress.     Appearance: She is well-developed. She is obese. She is not ill-appearing or toxic-appearing.   HENT:      Head: Normocephalic and atraumatic.      Right Ear: Tympanic membrane, ear canal and external ear normal.      Left Ear: Tympanic membrane, ear canal and external ear normal.      Nose: Nose normal.   Eyes:      Extraocular Movements: Extraocular movements intact.      Conjunctiva/sclera: Conjunctivae normal.      Pupils: Pupils are equal, round, and reactive to light.   Cardiovascular:      Rate and Rhythm: Normal rate and regular rhythm.      Heart sounds: Normal heart sounds.   Pulmonary:      Effort: Pulmonary effort is normal.      Comments: Decreased breath sounds bilaterally  Abdominal:      General: Bowel sounds are normal. There is no distension.      Palpations: Abdomen is soft. There is no mass.      Tenderness: There is no  abdominal tenderness.   Musculoskeletal:         General: Normal range of motion.      Cervical back: Neck supple.   Skin:     General: Skin is warm.   Neurological:      General: No focal deficit present.      Mental Status: She is alert and oriented to person, place, and time.   Psychiatric:         Mood and Affect: Mood normal.         Behavior: Behavior normal.       PHQ-9 Total Score:      Assessment/Plan   Diagnoses and all orders for this visit:    1. Post-menopausal (Primary)  -     DEXA Bone Density Axial; Future    2. Screening for colon cancer  Comments:  Patient has been rescheduled for January for colonoscopy.    3. Diarrhea of presumed infectious origin  Comments:  Colonoscopy pending after evaluation by I for diarrhea.    4. Multiple thyroid nodules  -     TSH    5. Vitamin D deficiency  Comments:  Takes vitamin D regularly  Orders:  -     Vitamin D 25 Hydroxy    6. Primary hypertension  Comments:  Controlled  Orders:  -     CBC Auto Differential  -     Comprehensive Metabolic Panel    7. Hyperlipidemia, unspecified hyperlipidemia type  Comments:  Trying to eat less saturated fats and is walking  Orders:  -     Lipid Panel    8. Hyperglycemia  Comments:  Trying to eat less carbohydrates and is walking  Orders:  -     Hemoglobin A1c    9. Depression, unspecified depression type  Comments:  No HI or SI mood is controlled.    10. B12 deficiency  Comments:  No longer taking as had high-level recently.  Orders:  -     Vitamin B12    11. Class 3 severe obesity due to excess calories with serious comorbidity and body mass index (BMI) of 40.0 to 44.9 in adult (HCC)        Patient Instructions     Health Maintenance Due   Topic Date Due   • COLORECTAL CANCER SCREENING  Never done   • TDAP/TD VACCINES (1 - Tdap) Never done   • ZOSTER VACCINE (1 of 2) Never done   • INFLUENZA VACCINE  08/01/2021   • COVID-19 Vaccine (3 - Booster for Moderna series) 10/24/2021   • DXA SCAN  10/31/2021     12 hour fast for  labs

## 2022-01-04 ENCOUNTER — OFFICE VISIT (OUTPATIENT)
Dept: PAIN MEDICINE | Facility: CLINIC | Age: 67
End: 2022-01-04

## 2022-01-04 VITALS
HEART RATE: 79 BPM | WEIGHT: 228 LBS | SYSTOLIC BLOOD PRESSURE: 129 MMHG | HEIGHT: 63 IN | BODY MASS INDEX: 40.4 KG/M2 | OXYGEN SATURATION: 96 % | DIASTOLIC BLOOD PRESSURE: 76 MMHG | RESPIRATION RATE: 16 BRPM

## 2022-01-04 DIAGNOSIS — M47.817 LUMBOSACRAL SPONDYLOSIS WITHOUT MYELOPATHY: ICD-10-CM

## 2022-01-04 DIAGNOSIS — G89.4 CHRONIC PAIN SYNDROME: Primary | ICD-10-CM

## 2022-01-04 DIAGNOSIS — M96.1 POSTLAMINECTOMY SYNDROME OF LUMBAR REGION: ICD-10-CM

## 2022-01-04 DIAGNOSIS — Z79.899 HIGH RISK MEDICATION USE: ICD-10-CM

## 2022-01-04 DIAGNOSIS — M25.50 POLYARTHRALGIA: ICD-10-CM

## 2022-01-04 PROCEDURE — 99214 OFFICE O/P EST MOD 30 MIN: CPT | Performed by: ANESTHESIOLOGY

## 2022-01-04 RX ORDER — HYDROCODONE BITARTRATE AND ACETAMINOPHEN 7.5; 325 MG/1; MG/1
1 TABLET ORAL 3 TIMES DAILY PRN
Qty: 90 TABLET | Refills: 0 | Status: SHIPPED | OUTPATIENT
Start: 2022-02-03 | End: 2022-03-03 | Stop reason: SDUPTHER

## 2022-01-04 RX ORDER — HYDROCODONE BITARTRATE AND ACETAMINOPHEN 7.5; 325 MG/1; MG/1
1 TABLET ORAL 3 TIMES DAILY PRN
Qty: 90 TABLET | Refills: 0 | Status: SHIPPED | OUTPATIENT
Start: 2022-01-04 | End: 2022-03-03 | Stop reason: SDUPTHER

## 2022-01-04 NOTE — PROGRESS NOTES
CC  hip/buttock, right leg, lower back pain  66-year-old female with chronic back pain/postlaminectomy syndrome history of fusion L2 to to S1, here for follow-up.  Was very busy physically with the holidays and noticing worsening myofascial pain however still has good relief with hydrocodone.  Continued chronic back pain radiating to bilateral lower extremity worse on the left with aching numbness in both legs worse with standing walking or activity.  Denies new weakness saddle anesthesia bladder bowel incontinence.    Pain interfering with daily activity and sleep.  Marginal relief with physical therapy or anti-inflammatories.  Marginal relief with caudal JESSICA in the past.  Declines LESI due to past experience with post dural puncture headache.  Mild relief with transforaminal JSESICA.      Utilizes hydrocodone with good relief functional benefit and side effects.      CT abdomen pelvis : L2-S1 posterior spinal fusion hardware appears intact. L4 lobectomy with interbody strut is in place. Advanced degenerative disc endplate changes  are present at the L1-2 level. No acute osseous abnormalities are identified.  L-spine MRI 2016: Multilevel degenerative disc disease and degenerative facet change as well as  multilevel postoperative changes detailed above.  There is a 14 mm of anterolisthesis of L4 on L5 which has worsened from the prior exam where this was only 4 mm.  This results in severe bilateral neural foraminal narrowing but no spinal canal stenosis.    Pain Assessment   Cause of Pain: surgery  Location of Pain: Lower Back, R Hip, L Hip, R Leg, L Leg  Description of Pain: Dull/Aching, Throbbing, Stabbing  Previous Pain Rating : 6  Current Pain Ratin  Aggravating Factors: Activity  Alleviating Factors: Rest, Medication  Previous Treatments: Epidural Steroids, Narcotic Pain Medication, Physical Therapy  Previous Diagnostic Studies: MRI   PEG Assessment   What number best describes your pain on average in the  past week?7  What number best describes how, during the past week, pain has interfered with your enjoyment of life?3  What number best describes how, during the past week, pain has interfered with your general activity? 9     has a past medical history of Arthritis, Depression, Low back pain, Osteoporosis, and Spinal stenosis (2017).     has a past surgical history that includes Cervical Curettage; Anterior cervical discectomy; Total vaginal hysterectomy; Appendectomy; Back surgery (); and Mediastinotomy for exploration / drainage / biopsy / removal Fb w/ cervical approach (2011).     Social History     Tobacco Use   • Smoking status: Former Smoker     Packs/day: 0.50     Years: 30.00     Pack years: 15.00     Types: Cigarettes     Quit date:      Years since quittin.0   • Smokeless tobacco: Never Used   Substance Use Topics   • Alcohol use: No     Review of Systems        See HPI      Vitals:    22 0907   BP: 129/76   Pulse: 79   Resp: 16   SpO2: 96%     Physical Exam  Vitals reviewed.   Constitutional:       General: She is not in acute distress.  Pulmonary:      Effort: Pulmonary effort is normal.   Musculoskeletal:      Lumbar back: Tenderness present. Positive right straight leg raise test.      Comments: Lumbar loading positive, pain on extension of low back past 5 degrees.  TTP on the lumbar facets noted.       PHQ 9 on chart                     opioid risk tool low risk      Assessment /Plan     Diagnoses and all orders for this visit:    1. Chronic pain syndrome (Primary)  -     HYDROcodone-acetaminophen (NORCO) 7.5-325 MG per tablet; Take 1 tablet by mouth 3 (Three) Times a Day As Needed for Severe Pain .  Dispense: 90 tablet; Refill: 0  -     HYDROcodone-acetaminophen (NORCO) 7.5-325 MG per tablet; Take 1 tablet by mouth 3 (Three) Times a Day As Needed for Severe Pain . DNF before 2/3/2022  Dispense: 90 tablet; Refill: 0    2. Postlaminectomy syndrome of lumbar region    3.  Lumbosacral spondylosis without myelopathy    4. Polyarthralgia    5. High risk medication use      Summary   66-year-old female with chronic back pain here for follow-up.    chronic pain from lumbar DDD spondylosis with radicular pain.  Chronic polyarthralgia.  Repeat right L4-L5 transforaminal JESSICA or SI injection as needed.    Was very busy physically with the holidays and noticing worsening myofascial pain however still has good relief with hydrocodone.    Continue hydrocodone 7.5/25 3 times daily.  UDS and inspect reviewed.  Discussed risk of tolerance, dependence, respiratory depression, coma and death associated with use of oral opioids for treatment of chronic nonmalignant pain.      Continue gabapentin/flexeril.  Cont. topical compounded neuropathic/anti-inflammatory cream with ketamine.      RTC 2 mo

## 2022-01-05 ENCOUNTER — CLINICAL SUPPORT (OUTPATIENT)
Dept: FAMILY MEDICINE CLINIC | Facility: CLINIC | Age: 67
End: 2022-01-05

## 2022-01-05 DIAGNOSIS — R30.0 DYSURIA: ICD-10-CM

## 2022-01-05 DIAGNOSIS — M25.562 ACUTE PAIN OF LEFT KNEE: ICD-10-CM

## 2022-01-05 PROCEDURE — 87088 URINE BACTERIA CULTURE: CPT | Performed by: PREVENTIVE MEDICINE

## 2022-01-05 PROCEDURE — 82607 VITAMIN B-12: CPT | Performed by: PREVENTIVE MEDICINE

## 2022-01-05 PROCEDURE — 85025 COMPLETE CBC W/AUTO DIFF WBC: CPT | Performed by: PREVENTIVE MEDICINE

## 2022-01-05 PROCEDURE — 80061 LIPID PANEL: CPT | Performed by: PREVENTIVE MEDICINE

## 2022-01-05 PROCEDURE — 87086 URINE CULTURE/COLONY COUNT: CPT | Performed by: PREVENTIVE MEDICINE

## 2022-01-05 PROCEDURE — 84443 ASSAY THYROID STIM HORMONE: CPT | Performed by: PREVENTIVE MEDICINE

## 2022-01-05 PROCEDURE — 83036 HEMOGLOBIN GLYCOSYLATED A1C: CPT | Performed by: PREVENTIVE MEDICINE

## 2022-01-05 PROCEDURE — 36415 COLL VENOUS BLD VENIPUNCTURE: CPT | Performed by: PREVENTIVE MEDICINE

## 2022-01-05 PROCEDURE — 80053 COMPREHEN METABOLIC PANEL: CPT | Performed by: PREVENTIVE MEDICINE

## 2022-01-05 PROCEDURE — 87186 SC STD MICRODIL/AGAR DIL: CPT | Performed by: PREVENTIVE MEDICINE

## 2022-01-05 PROCEDURE — 82306 VITAMIN D 25 HYDROXY: CPT | Performed by: PREVENTIVE MEDICINE

## 2022-01-05 NOTE — PROGRESS NOTES
Venipuncture Blood Specimen Collection  Venipuncture performed in right arm by Karol Maxwell MA with good hemostasis. Patient tolerated the procedure well without complications.   01/05/22   Karol Maxwell MA

## 2022-01-06 LAB
25(OH)D3 SERPL-MCNC: 34.5 NG/ML (ref 30–100)
ALBUMIN SERPL-MCNC: 4.5 G/DL (ref 3.5–5.2)
ALBUMIN/GLOB SERPL: 1.9 G/DL
ALP SERPL-CCNC: 78 U/L (ref 39–117)
ALT SERPL W P-5'-P-CCNC: 25 U/L (ref 1–33)
ANION GAP SERPL CALCULATED.3IONS-SCNC: 11.7 MMOL/L (ref 5–15)
AST SERPL-CCNC: 19 U/L (ref 1–32)
BASOPHILS # BLD AUTO: 0.04 10*3/MM3 (ref 0–0.2)
BASOPHILS NFR BLD AUTO: 0.4 % (ref 0–1.5)
BILIRUB SERPL-MCNC: 0.3 MG/DL (ref 0–1.2)
BUN SERPL-MCNC: 16 MG/DL (ref 8–23)
BUN/CREAT SERPL: 15.7 (ref 7–25)
CALCIUM SPEC-SCNC: 9.9 MG/DL (ref 8.6–10.5)
CHLORIDE SERPL-SCNC: 104 MMOL/L (ref 98–107)
CHOLEST SERPL-MCNC: 116 MG/DL (ref 0–200)
CO2 SERPL-SCNC: 28.3 MMOL/L (ref 22–29)
CREAT SERPL-MCNC: 1.02 MG/DL (ref 0.57–1)
DEPRECATED RDW RBC AUTO: 44.1 FL (ref 37–54)
EOSINOPHIL # BLD AUTO: 0.12 10*3/MM3 (ref 0–0.4)
EOSINOPHIL NFR BLD AUTO: 1.2 % (ref 0.3–6.2)
ERYTHROCYTE [DISTWIDTH] IN BLOOD BY AUTOMATED COUNT: 13 % (ref 12.3–15.4)
GFR SERPL CREATININE-BSD FRML MDRD: 55 ML/MIN/1.73
GLOBULIN UR ELPH-MCNC: 2.4 GM/DL
GLUCOSE SERPL-MCNC: 104 MG/DL (ref 65–99)
HBA1C MFR BLD: 5.2 % (ref 3.5–5.6)
HCT VFR BLD AUTO: 38 % (ref 34–46.6)
HDLC SERPL-MCNC: 37 MG/DL (ref 40–60)
HGB BLD-MCNC: 12.7 G/DL (ref 12–15.9)
IMM GRANULOCYTES # BLD AUTO: 0.05 10*3/MM3 (ref 0–0.05)
IMM GRANULOCYTES NFR BLD AUTO: 0.5 % (ref 0–0.5)
LDLC SERPL CALC-MCNC: 57 MG/DL (ref 0–100)
LDLC/HDLC SERPL: 1.47 {RATIO}
LYMPHOCYTES # BLD AUTO: 1.7 10*3/MM3 (ref 0.7–3.1)
LYMPHOCYTES NFR BLD AUTO: 16.6 % (ref 19.6–45.3)
MCH RBC QN AUTO: 31.4 PG (ref 26.6–33)
MCHC RBC AUTO-ENTMCNC: 33.4 G/DL (ref 31.5–35.7)
MCV RBC AUTO: 93.8 FL (ref 79–97)
MONOCYTES # BLD AUTO: 0.85 10*3/MM3 (ref 0.1–0.9)
MONOCYTES NFR BLD AUTO: 8.3 % (ref 5–12)
NEUTROPHILS NFR BLD AUTO: 7.49 10*3/MM3 (ref 1.7–7)
NEUTROPHILS NFR BLD AUTO: 73 % (ref 42.7–76)
NRBC BLD AUTO-RTO: 0 /100 WBC (ref 0–0.2)
PLATELET # BLD AUTO: 340 10*3/MM3 (ref 140–450)
PMV BLD AUTO: 11.1 FL (ref 6–12)
POTASSIUM SERPL-SCNC: 4.8 MMOL/L (ref 3.5–5.2)
PROT SERPL-MCNC: 6.9 G/DL (ref 6–8.5)
RBC # BLD AUTO: 4.05 10*6/MM3 (ref 3.77–5.28)
SODIUM SERPL-SCNC: 144 MMOL/L (ref 136–145)
TRIGL SERPL-MCNC: 123 MG/DL (ref 0–150)
TSH SERPL DL<=0.05 MIU/L-ACNC: 1.78 UIU/ML (ref 0.27–4.2)
VIT B12 BLD-MCNC: 321 PG/ML (ref 211–946)
VLDLC SERPL-MCNC: 22 MG/DL (ref 5–40)
WBC NRBC COR # BLD: 10.25 10*3/MM3 (ref 3.4–10.8)

## 2022-01-07 DIAGNOSIS — N39.0 URINARY TRACT INFECTION WITHOUT HEMATURIA, SITE UNSPECIFIED: Primary | ICD-10-CM

## 2022-01-07 LAB — BACTERIA SPEC AEROBE CULT: ABNORMAL

## 2022-01-07 RX ORDER — NITROFURANTOIN 25; 75 MG/1; MG/1
100 CAPSULE ORAL 2 TIMES DAILY
Qty: 14 CAPSULE | Refills: 0 | Status: SHIPPED | OUTPATIENT
Start: 2022-01-07 | End: 2022-01-07 | Stop reason: SDUPTHER

## 2022-01-07 RX ORDER — NITROFURANTOIN 25; 75 MG/1; MG/1
100 CAPSULE ORAL 2 TIMES DAILY
Qty: 14 CAPSULE | Refills: 0 | Status: SHIPPED | OUTPATIENT
Start: 2022-01-07 | End: 2022-01-20 | Stop reason: SDUPTHER

## 2022-01-13 RX ORDER — CYCLOBENZAPRINE HCL 10 MG
TABLET ORAL
Qty: 90 TABLET | Refills: 11 | Status: SHIPPED | OUTPATIENT
Start: 2022-01-13 | End: 2022-03-03 | Stop reason: SDUPTHER

## 2022-01-14 ENCOUNTER — TELEPHONE (OUTPATIENT)
Dept: FAMILY MEDICINE CLINIC | Facility: CLINIC | Age: 67
End: 2022-01-14

## 2022-01-14 RX ORDER — LISINOPRIL AND HYDROCHLOROTHIAZIDE 25; 20 MG/1; MG/1
TABLET ORAL
Qty: 90 TABLET | Refills: 0 | Status: SHIPPED | OUTPATIENT
Start: 2022-01-14 | End: 2022-02-15 | Stop reason: SDUPTHER

## 2022-01-14 NOTE — TELEPHONE ENCOUNTER
HUB TO READ     PT has not completed cologuard. Would she like us to reorder or to send information on a colonoscopy?

## 2022-01-17 ENCOUNTER — CLINICAL SUPPORT (OUTPATIENT)
Dept: FAMILY MEDICINE CLINIC | Facility: CLINIC | Age: 67
End: 2022-01-17

## 2022-01-17 DIAGNOSIS — R30.0 DYSURIA: Primary | ICD-10-CM

## 2022-01-17 DIAGNOSIS — N39.0 URINARY TRACT INFECTION WITHOUT HEMATURIA, SITE UNSPECIFIED: ICD-10-CM

## 2022-01-17 LAB
BILIRUB BLD-MCNC: NEGATIVE MG/DL
BILIRUB UR QL STRIP: NEGATIVE
CLARITY UR: CLEAR
CLARITY, POC: CLEAR
COLOR UR: YELLOW
COLOR UR: YELLOW
EXPIRATION DATE: NORMAL
GLUCOSE UR STRIP-MCNC: NEGATIVE MG/DL
GLUCOSE UR STRIP-MCNC: NEGATIVE MG/DL
HGB UR QL STRIP.AUTO: NEGATIVE
KETONES UR QL STRIP: NEGATIVE
KETONES UR QL: NEGATIVE
LEUKOCYTE EST, POC: NEGATIVE
LEUKOCYTE ESTERASE UR QL STRIP.AUTO: NEGATIVE
Lab: NORMAL
NITRITE UR QL STRIP: NEGATIVE
NITRITE UR-MCNC: NEGATIVE MG/ML
PH UR STRIP.AUTO: 6 [PH] (ref 5–8)
PH UR: 6 [PH] (ref 5–8)
PROT UR QL STRIP: NEGATIVE
PROT UR STRIP-MCNC: NEGATIVE MG/DL
RBC # UR STRIP: NEGATIVE /UL
SP GR UR STRIP: 1.03 (ref 1–1.03)
SP GR UR: 1.03 (ref 1–1.03)
UROBILINOGEN UR QL STRIP: NORMAL
UROBILINOGEN UR QL: NORMAL

## 2022-01-17 PROCEDURE — 87186 SC STD MICRODIL/AGAR DIL: CPT | Performed by: PREVENTIVE MEDICINE

## 2022-01-17 PROCEDURE — 81003 URINALYSIS AUTO W/O SCOPE: CPT

## 2022-01-17 PROCEDURE — 87088 URINE BACTERIA CULTURE: CPT | Performed by: PREVENTIVE MEDICINE

## 2022-01-17 PROCEDURE — 87086 URINE CULTURE/COLONY COUNT: CPT | Performed by: PREVENTIVE MEDICINE

## 2022-01-17 PROCEDURE — 81003 URINALYSIS AUTO W/O SCOPE: CPT | Performed by: PREVENTIVE MEDICINE

## 2022-01-19 DIAGNOSIS — N39.0 URINARY TRACT INFECTION WITHOUT HEMATURIA, SITE UNSPECIFIED: Primary | ICD-10-CM

## 2022-01-19 LAB — BACTERIA SPEC AEROBE CULT: ABNORMAL

## 2022-01-19 RX ORDER — NITROFURANTOIN 25; 75 MG/1; MG/1
100 CAPSULE ORAL 2 TIMES DAILY
Qty: 14 CAPSULE | Refills: 0 | Status: SHIPPED | OUTPATIENT
Start: 2022-01-19 | End: 2022-02-04

## 2022-01-19 NOTE — PROGRESS NOTES
Patient has uti-have sent antibiotics to pharmacy.  3 days after completes prescription, need to repeat careful clean catch urine to make sure gone

## 2022-01-20 RX ORDER — NITROFURANTOIN 25; 75 MG/1; MG/1
100 CAPSULE ORAL 2 TIMES DAILY
Qty: 14 CAPSULE | Refills: 0 | Status: SHIPPED | OUTPATIENT
Start: 2022-01-20 | End: 2022-02-04 | Stop reason: SDUPTHER

## 2022-02-02 ENCOUNTER — CLINICAL SUPPORT (OUTPATIENT)
Dept: FAMILY MEDICINE CLINIC | Facility: CLINIC | Age: 67
End: 2022-02-02

## 2022-02-02 ENCOUNTER — TELEPHONE (OUTPATIENT)
Dept: FAMILY MEDICINE CLINIC | Facility: CLINIC | Age: 67
End: 2022-02-02

## 2022-02-02 DIAGNOSIS — N39.0 URINARY TRACT INFECTION WITHOUT HEMATURIA, SITE UNSPECIFIED: Primary | ICD-10-CM

## 2022-02-02 LAB
BILIRUB BLD-MCNC: NEGATIVE MG/DL
CLARITY, POC: CLEAR
COLOR UR: YELLOW
EXPIRATION DATE: NORMAL
GLUCOSE UR STRIP-MCNC: NEGATIVE MG/DL
KETONES UR QL: NEGATIVE
LEUKOCYTE EST, POC: NEGATIVE
Lab: NORMAL
NITRITE UR-MCNC: NEGATIVE MG/ML
PH UR: 5.5 [PH] (ref 5–8)
PROT UR STRIP-MCNC: NEGATIVE MG/DL
RBC # UR STRIP: NEGATIVE /UL
SP GR UR: 1.02 (ref 1–1.03)
UROBILINOGEN UR QL: NORMAL

## 2022-02-02 PROCEDURE — 87088 URINE BACTERIA CULTURE: CPT | Performed by: PREVENTIVE MEDICINE

## 2022-02-02 PROCEDURE — 81003 URINALYSIS AUTO W/O SCOPE: CPT | Performed by: PREVENTIVE MEDICINE

## 2022-02-02 PROCEDURE — 87186 SC STD MICRODIL/AGAR DIL: CPT | Performed by: PREVENTIVE MEDICINE

## 2022-02-02 PROCEDURE — 87086 URINE CULTURE/COLONY COUNT: CPT | Performed by: PREVENTIVE MEDICINE

## 2022-02-04 LAB — BACTERIA SPEC AEROBE CULT: ABNORMAL

## 2022-02-04 RX ORDER — NITROFURANTOIN 25; 75 MG/1; MG/1
100 CAPSULE ORAL 2 TIMES DAILY
Qty: 20 CAPSULE | Refills: 0 | Status: SHIPPED | OUTPATIENT
Start: 2022-02-04 | End: 2022-02-15 | Stop reason: SDUPTHER

## 2022-02-15 RX ORDER — NITROFURANTOIN 25; 75 MG/1; MG/1
CAPSULE ORAL
COMMUNITY
Start: 2022-02-04 | End: 2022-02-20 | Stop reason: SDUPTHER

## 2022-02-15 RX ORDER — LISINOPRIL AND HYDROCHLOROTHIAZIDE 25; 20 MG/1; MG/1
TABLET ORAL
COMMUNITY
Start: 2022-02-02 | End: 2022-04-17

## 2022-02-17 ENCOUNTER — CLINICAL SUPPORT (OUTPATIENT)
Dept: FAMILY MEDICINE CLINIC | Facility: CLINIC | Age: 67
End: 2022-02-17

## 2022-02-17 DIAGNOSIS — N39.0 URINARY TRACT INFECTION WITHOUT HEMATURIA, SITE UNSPECIFIED: Primary | ICD-10-CM

## 2022-02-17 PROCEDURE — 87186 SC STD MICRODIL/AGAR DIL: CPT | Performed by: PREVENTIVE MEDICINE

## 2022-02-17 PROCEDURE — 87088 URINE BACTERIA CULTURE: CPT | Performed by: PREVENTIVE MEDICINE

## 2022-02-17 PROCEDURE — 87086 URINE CULTURE/COLONY COUNT: CPT | Performed by: PREVENTIVE MEDICINE

## 2022-02-19 LAB — BACTERIA SPEC AEROBE CULT: ABNORMAL

## 2022-02-20 DIAGNOSIS — M17.0 PRIMARY OSTEOARTHRITIS OF KNEES, BILATERAL: ICD-10-CM

## 2022-02-20 DIAGNOSIS — N39.0 URINARY TRACT INFECTION WITHOUT HEMATURIA, SITE UNSPECIFIED: Primary | ICD-10-CM

## 2022-02-20 RX ORDER — NITROFURANTOIN 25; 75 MG/1; MG/1
100 CAPSULE ORAL 2 TIMES DAILY
Qty: 14 CAPSULE | Refills: 0 | Status: SHIPPED | OUTPATIENT
Start: 2022-02-20 | End: 2022-04-28

## 2022-02-20 NOTE — PROGRESS NOTES
Urine is still infected-have sent more antibiotics to pharmacy-take till gone and again repeat culture 3 days off antibiotics

## 2022-02-21 RX ORDER — MELOXICAM 15 MG/1
TABLET ORAL
Qty: 30 TABLET | Refills: 4 | OUTPATIENT
Start: 2022-02-21

## 2022-02-21 NOTE — TELEPHONE ENCOUNTER
Refill request for Meloxicam, last office visit 10/27/2021, no follow up scheduled.  Please advise.

## 2022-02-21 NOTE — TELEPHONE ENCOUNTER
Call placed to patient, left message on machine advising of need to discuss further refills with PCP office.

## 2022-03-02 ENCOUNTER — CLINICAL SUPPORT (OUTPATIENT)
Dept: FAMILY MEDICINE CLINIC | Facility: CLINIC | Age: 67
End: 2022-03-02

## 2022-03-02 DIAGNOSIS — N39.0 URINARY TRACT INFECTION WITHOUT HEMATURIA, SITE UNSPECIFIED: ICD-10-CM

## 2022-03-02 PROCEDURE — 87086 URINE CULTURE/COLONY COUNT: CPT | Performed by: PREVENTIVE MEDICINE

## 2022-03-03 ENCOUNTER — TELEPHONE (OUTPATIENT)
Dept: FAMILY MEDICINE CLINIC | Facility: CLINIC | Age: 67
End: 2022-03-03

## 2022-03-03 ENCOUNTER — OFFICE VISIT (OUTPATIENT)
Dept: PAIN MEDICINE | Facility: CLINIC | Age: 67
End: 2022-03-03

## 2022-03-03 VITALS
DIASTOLIC BLOOD PRESSURE: 58 MMHG | HEIGHT: 63 IN | BODY MASS INDEX: 40.4 KG/M2 | SYSTOLIC BLOOD PRESSURE: 112 MMHG | RESPIRATION RATE: 16 BRPM | OXYGEN SATURATION: 96 % | HEART RATE: 86 BPM | WEIGHT: 228 LBS

## 2022-03-03 DIAGNOSIS — G89.4 CHRONIC PAIN SYNDROME: Primary | ICD-10-CM

## 2022-03-03 DIAGNOSIS — M25.50 POLYARTHRALGIA: ICD-10-CM

## 2022-03-03 DIAGNOSIS — M47.817 LUMBOSACRAL SPONDYLOSIS WITHOUT MYELOPATHY: ICD-10-CM

## 2022-03-03 DIAGNOSIS — Z79.899 HIGH RISK MEDICATION USE: Primary | ICD-10-CM

## 2022-03-03 DIAGNOSIS — Z79.899 HIGH RISK MEDICATION USE: ICD-10-CM

## 2022-03-03 DIAGNOSIS — M96.1 POSTLAMINECTOMY SYNDROME OF LUMBAR REGION: ICD-10-CM

## 2022-03-03 LAB — BACTERIA SPEC AEROBE CULT: NORMAL

## 2022-03-03 PROCEDURE — 99214 OFFICE O/P EST MOD 30 MIN: CPT | Performed by: ANESTHESIOLOGY

## 2022-03-03 RX ORDER — HYDROCODONE BITARTRATE AND ACETAMINOPHEN 7.5; 325 MG/1; MG/1
1 TABLET ORAL 3 TIMES DAILY PRN
Qty: 90 TABLET | Refills: 0 | Status: SHIPPED | OUTPATIENT
Start: 2022-03-03 | End: 2022-03-03 | Stop reason: SDUPTHER

## 2022-03-03 RX ORDER — CYCLOBENZAPRINE HCL 10 MG
TABLET ORAL
COMMUNITY
Start: 2022-02-07 | End: 2022-10-17

## 2022-03-03 RX ORDER — HYDROCODONE BITARTRATE AND ACETAMINOPHEN 7.5; 325 MG/1; MG/1
1 TABLET ORAL 3 TIMES DAILY PRN
Qty: 90 TABLET | Refills: 0 | Status: SHIPPED | OUTPATIENT
Start: 2022-04-04 | End: 2022-03-03 | Stop reason: SDUPTHER

## 2022-03-03 RX ORDER — HYDROCODONE BITARTRATE AND ACETAMINOPHEN 7.5; 325 MG/1; MG/1
1 TABLET ORAL 3 TIMES DAILY PRN
Qty: 90 TABLET | Refills: 0 | Status: SHIPPED | OUTPATIENT
Start: 2022-04-04 | End: 2022-04-28 | Stop reason: SDUPTHER

## 2022-03-03 RX ORDER — HYDROCODONE BITARTRATE AND ACETAMINOPHEN 7.5; 325 MG/1; MG/1
1 TABLET ORAL 3 TIMES DAILY PRN
Qty: 90 TABLET | Refills: 0 | Status: SHIPPED | OUTPATIENT
Start: 2022-03-04 | End: 2022-04-28 | Stop reason: SDUPTHER

## 2022-03-03 RX ORDER — LIDOCAINE 50 MG/G
1 PATCH TOPICAL EVERY 24 HOURS
Qty: 30 PATCH | Refills: 11 | Status: SHIPPED | OUTPATIENT
Start: 2022-03-03 | End: 2022-04-28

## 2022-03-03 NOTE — PROGRESS NOTES
CC  hip/buttock, right leg, lower back pain  67-year-old female with chronic back pain/postlaminectomy syndrome history of fusion L2 to to S1, here for follow-up.  Denies new issues today.  Chronic back pain radiating to bilateral lower extremity worse on the left with aching numbness in both legs worse with standing walking or activity.  Denies new weakness saddle anesthesia bladder bowel incontinence.    Pain interfering with daily activity and sleep.  Marginal relief with physical therapy or anti-inflammatories.  Marginal relief with caudal JESSICA in the past.  Declines LESI due to past experience with post dural puncture headache.  Mild relief with transforaminal JESSICA.      Utilizes hydrocodone with good relief functional benefit and side effects.      CT abdomen pelvis : L2-S1 posterior spinal fusion hardware appears intact. L4 lobectomy with interbody strut is in place. Advanced degenerative disc endplate changes  are present at the L1-2 level. No acute osseous abnormalities are identified.  L-spine MRI 2016: Multilevel degenerative disc disease and degenerative facet change as well as  multilevel postoperative changes detailed above.  There is a 14 mm of anterolisthesis of L4 on L5 which has worsened from the prior exam where this was only 4 mm.  This results in severe bilateral neural foraminal narrowing but no spinal canal stenosis.    Pain Assessment   Cause of Pain: surgery  Location of Pain: Lower Back, R Hip, L Hip, R Leg, L Leg  Description of Pain: Dull/Aching, Throbbing, Stabbing  Previous Pain Rating : 7  Current Pain Ratin  Aggravating Factors: Activity  Alleviating Factors: Rest, Medication  Previous Treatments: Epidural Steroids, Narcotic Pain Medication, Physical Therapy  Previous Diagnostic Studies: MRI   PEG Assessment   What number best describes your pain on average in the past week?7  What number best describes how, during the past week, pain has interfered with your enjoyment of  life?6  What number best describes how, during the past week, pain has interfered with your general activity? 9     has a past medical history of Arthritis, Depression, Low back pain, Osteoporosis, and Spinal stenosis (2017).     has a past surgical history that includes Cervical Curettage; Anterior cervical discectomy; Total vaginal hysterectomy; Appendectomy; Back surgery (); and Mediastinotomy for exploration / drainage / biopsy / removal Fb w/ cervical approach (2011).     Social History     Tobacco Use   • Smoking status: Former Smoker     Packs/day: 0.50     Years: 30.00     Pack years: 15.00     Types: Cigarettes     Quit date:      Years since quittin.1   • Smokeless tobacco: Never Used   Substance Use Topics   • Alcohol use: No     Review of Systems        See HPI      Vitals:    22 0853   BP: 112/58   Pulse: 86   Resp: 16   SpO2: 96%     Physical Exam  Vitals reviewed.   Constitutional:       General: She is not in acute distress.  Pulmonary:      Effort: Pulmonary effort is normal.   Musculoskeletal:      Lumbar back: Tenderness present. Positive right straight leg raise test.      Comments: Lumbar loading positive, pain on extension of low back past 5 degrees.  TTP on the lumbar facets noted.       PHQ 9 on chart                     opioid risk tool low risk      Assessment /Plan     Diagnoses and all orders for this visit:    1. Chronic pain syndrome (Primary)  -     lidocaine (LIDODERM) 5 %; Place 1 patch on the skin as directed by provider Daily. Remove & Discard patch within 12 hours or as directed by MD  Dispense: 30 patch; Refill: 11  -     HYDROcodone-acetaminophen (NORCO) 7.5-325 MG per tablet; Take 1 tablet by mouth 3 (Three) Times a Day As Needed for Severe Pain .  Dispense: 90 tablet; Refill: 0  -     HYDROcodone-acetaminophen (NORCO) 7.5-325 MG per tablet; Take 1 tablet by mouth 3 (Three) Times a Day As Needed for Severe Pain . DNF before 2022  Dispense: 90  tablet; Refill: 0    2. Postlaminectomy syndrome of lumbar region    3. Lumbosacral spondylosis without myelopathy    4. Polyarthralgia    5. High risk medication use      Summary   67-year-old female with chronic back pain here for follow-up.    chronic pain from lumbar DDD spondylosis with radicular pain.  Chronic polyarthralgia.  Repeat right L4-L5 transforaminal JESSICA or SI injection as needed.    Denies new complaints.    Continue hydrocodone 7.5/25 3 times daily.  UDS and inspect reviewed.  Discussed risk of tolerance, dependence, respiratory depression, coma and death associated with use of oral opioids for treatment of chronic nonmalignant pain.      Continue gabapentin/flexeril.  Cont. topical compounded neuropathic/anti-inflammatory cream with ketamine.      RTC 2 mo

## 2022-03-03 NOTE — TELEPHONE ENCOUNTER
HUB TO READ:  ----- Message from Ban Alexander MD sent at 3/3/2022 11:48 AM EST -----  Urine culture looks like normal urine germs from vagina

## 2022-03-07 ENCOUNTER — OFFICE VISIT (OUTPATIENT)
Dept: ORTHOPEDIC SURGERY | Facility: CLINIC | Age: 67
End: 2022-03-07

## 2022-03-07 VITALS
BODY MASS INDEX: 40.75 KG/M2 | WEIGHT: 230 LBS | RESPIRATION RATE: 18 BRPM | HEIGHT: 63 IN | HEART RATE: 71 BPM | OXYGEN SATURATION: 99 %

## 2022-03-07 DIAGNOSIS — M17.12 PRIMARY OSTEOARTHRITIS OF LEFT KNEE: Primary | ICD-10-CM

## 2022-03-07 PROCEDURE — 20610 DRAIN/INJ JOINT/BURSA W/O US: CPT | Performed by: FAMILY MEDICINE

## 2022-03-07 PROCEDURE — 99213 OFFICE O/P EST LOW 20 MIN: CPT | Performed by: FAMILY MEDICINE

## 2022-03-07 RX ORDER — TRIAMCINOLONE ACETONIDE 40 MG/ML
80 INJECTION, SUSPENSION INTRA-ARTICULAR; INTRAMUSCULAR
Status: COMPLETED | OUTPATIENT
Start: 2022-03-07 | End: 2022-03-07

## 2022-03-07 RX ADMIN — TRIAMCINOLONE ACETONIDE 80 MG: 40 INJECTION, SUSPENSION INTRA-ARTICULAR; INTRAMUSCULAR at 14:19

## 2022-03-07 NOTE — PROGRESS NOTES
Procedure   Large Joint Arthrocentesis: L knee  Date/Time: 3/7/2022 2:19 PM  Consent given by: patient  Site marked: site marked  Timeout: Immediately prior to procedure a time out was called to verify the correct patient, procedure, equipment, support staff and site/side marked as required   Supporting Documentation  Indications: pain   Procedure Details  Location: knee - L knee  Preparation: Patient was prepped and draped in the usual sterile fashion  Needle size: 25 G  Approach: anteromedial  Medications administered: 80 mg triamcinolone acetonide 40 MG/ML (2cc of 1% lidocaine without epinepherine, and 2cc of 40mg Kenalog)  Patient tolerance: patient tolerated the procedure well with no immediate complications (Blood loss negligable, pt admits to immediate decrease in pain and improved ROM with gentle ambulation post injection.)

## 2022-03-07 NOTE — PROGRESS NOTES
Primary Care Sports Medicine Office Visit Note     Patient ID: Sherrie Chand is a 67 y.o. female.    Chief Complaint:  Chief Complaint   Patient presents with   • Left Knee - Pain     HPI:    Ms. Sherrie Chand is a 67 y.o. female who presents to the clinic today for follow-up of left knee pain.  The patient states she has attempted all conservative measures as previous discussed.  Knee pain persists, similar in location and severity to previous evaluation.  Please see previous note.    Past Medical History:   Diagnosis Date   • Arthritis    • Depression    • Low back pain    • Osteoporosis    • Spinal stenosis 2017       Past Surgical History:   Procedure Laterality Date   • ANTERIOR CERVICAL DISCECTOMY      C3-C4/Ant. plating C3-C6 for non-union-abstracted from [facility]   • APPENDECTOMY     • BACK SURGERY  2017    Interior and posterior-Abstracted from [facility]   • CERVICAL CORPECTOMY      C5   • MEDIASTINOTOMY FOR EXPLORATION / DRAINAGE / BIOPSY / REMOVAL FB W/ CERVICAL APPROACH  2011    Removal of cervical plate-Abstracted from [facility]   • VAGINAL HYSTERECTOMY         Family History   Problem Relation Age of Onset   • Arthritis Mother    • Arthritis Father    • Lung cancer Father    • Other Father         Other cancer   • Diabetes Father    • Arthritis Maternal Grandmother    • Lung cancer Other      Social History     Occupational History   • Not on file   Tobacco Use   • Smoking status: Former Smoker     Packs/day: 0.50     Years: 30.00     Pack years: 15.00     Types: Cigarettes     Quit date:      Years since quittin.1   • Smokeless tobacco: Never Used   Vaping Use   • Vaping Use: Former   Substance and Sexual Activity   • Alcohol use: No   • Drug use: No   • Sexual activity: Yes     Partners: Male     Birth control/protection: None      Review of Systems   Constitutional: Negative for activity change and fever.   Musculoskeletal: Positive for arthralgias.   Skin: Negative for color change and rash.  "  Neurological: Negative for weakness.     Objective:    Pulse 71   Resp 18   Ht 160 cm (63\")   Wt 104 kg (230 lb)   LMP  (LMP Unknown)   SpO2 99%   BMI 40.74 kg/m²     Physical Examination:  Physical Exam  Vitals and nursing note reviewed.   Constitutional:       General: She is not in acute distress.     Appearance: She is well-developed. She is not diaphoretic.   HENT:      Head: Normocephalic and atraumatic.   Eyes:      Conjunctiva/sclera: Conjunctivae normal.   Pulmonary:      Effort: Pulmonary effort is normal. No respiratory distress.   Musculoskeletal:      Left knee: No effusion.      Instability Tests: Medial Jacob test negative and lateral Jacob test negative.   Skin:     General: Skin is warm.      Capillary Refill: Capillary refill takes less than 2 seconds.   Neurological:      Mental Status: She is alert.       Left Ankle Exam     Range of Motion   The patient has normal left ankle ROM.       Left Knee Exam     Muscle Strength   The patient has normal left knee strength.    Tenderness   The patient is experiencing tenderness in the lateral joint line, medial joint line and patella.    Range of Motion   Extension: normal   Flexion: normal     Tests   Jacob:  Medial - negative Lateral - negative  Varus: negative Valgus: negative  Left knee patellar apprehension test: +patellar grind testing, +kaiden eric testing.    Other   Erythema: absent  Sensation: normal  Pulse: present (distal to the knee, DP and PT palpable)  Swelling: none  Effusion: no effusion present        Imaging and other tests:  No new imaging today.    Assessment and Plan:    1. Primary osteoarthritis of left knee    After discussion of risks and benefits, the patient elected to proceed with corticosteroid injection to the left knee.  The patient tolerated this procedure well without any complaints or problems.  I recommended continuation of conservative management as previous, RTC in 3-6 months or sooner if symptoms " "margarita.    Sarmad DAWN \"Chance\" Ethan VERDUGO, , CAQSM  03/07/22  14:19 EST    Disclaimer: Please note that areas of this note were completed with computer voice recognition software.  Quite often unanticipated grammatical, syntax, homophones, and other interpretive errors are inadvertently transcribed by the computer software. Please excuse any errors that have escaped final proofreading.  "

## 2022-04-14 ENCOUNTER — TELEPHONE (OUTPATIENT)
Dept: FAMILY MEDICINE CLINIC | Facility: CLINIC | Age: 67
End: 2022-04-14

## 2022-04-14 DIAGNOSIS — Z78.0 POST-MENOPAUSAL: ICD-10-CM

## 2022-04-14 NOTE — TELEPHONE ENCOUNTER
HUB TO READ:  ----- Message from Ban Alexander MD sent at 4/14/2022  9:36 AM EDT -----  Normal bone density keep up the good work

## 2022-04-17 RX ORDER — LISINOPRIL AND HYDROCHLOROTHIAZIDE 25; 20 MG/1; MG/1
TABLET ORAL
Qty: 90 TABLET | Refills: 1 | Status: SHIPPED | OUTPATIENT
Start: 2022-04-17 | End: 2022-09-11

## 2022-04-28 ENCOUNTER — OFFICE VISIT (OUTPATIENT)
Dept: PAIN MEDICINE | Facility: CLINIC | Age: 67
End: 2022-04-28

## 2022-04-28 VITALS
WEIGHT: 230 LBS | HEART RATE: 83 BPM | SYSTOLIC BLOOD PRESSURE: 117 MMHG | RESPIRATION RATE: 16 BRPM | HEIGHT: 63 IN | OXYGEN SATURATION: 97 % | BODY MASS INDEX: 40.75 KG/M2 | DIASTOLIC BLOOD PRESSURE: 68 MMHG

## 2022-04-28 DIAGNOSIS — G89.4 CHRONIC PAIN SYNDROME: Primary | ICD-10-CM

## 2022-04-28 DIAGNOSIS — M47.817 LUMBOSACRAL SPONDYLOSIS WITHOUT MYELOPATHY: ICD-10-CM

## 2022-04-28 DIAGNOSIS — M96.1 POSTLAMINECTOMY SYNDROME OF LUMBAR REGION: ICD-10-CM

## 2022-04-28 DIAGNOSIS — M25.50 POLYARTHRALGIA: ICD-10-CM

## 2022-04-28 DIAGNOSIS — Z79.899 HIGH RISK MEDICATION USE: ICD-10-CM

## 2022-04-28 PROCEDURE — 99214 OFFICE O/P EST MOD 30 MIN: CPT | Performed by: ANESTHESIOLOGY

## 2022-04-28 RX ORDER — HYDROCODONE BITARTRATE AND ACETAMINOPHEN 7.5; 325 MG/1; MG/1
1 TABLET ORAL 3 TIMES DAILY PRN
Qty: 90 TABLET | Refills: 0 | Status: SHIPPED | OUTPATIENT
Start: 2022-06-02 | End: 2022-06-23 | Stop reason: SDUPTHER

## 2022-04-28 RX ORDER — HYDROCODONE BITARTRATE AND ACETAMINOPHEN 7.5; 325 MG/1; MG/1
1 TABLET ORAL 3 TIMES DAILY PRN
Qty: 90 TABLET | Refills: 0 | Status: SHIPPED | OUTPATIENT
Start: 2022-05-03 | End: 2022-06-23 | Stop reason: SDUPTHER

## 2022-04-28 NOTE — PROGRESS NOTES
CC  hip/buttock, right leg, lower back pain  67-year-old female with chronic back pain/postlaminectomy syndrome history of fusion L2 to to S1, here for follow-up.  Continued chronic back pain radiating to bilateral lower extremity worse on the left with aching numbness in both legs worse with standing walking or activity.  Denies new weakness saddle anesthesia bladder bowel incontinence.    Pain interfering with daily activity and sleep.  Marginal relief with physical therapy or anti-inflammatories.  Marginal relief with caudal JESSICA in the past.  Declines LESI due to past experience with post dural puncture headache.  Mild relief with transforaminal JESSICA.      Utilizes hydrocodone with good relief functional benefit and side effects.      CT abdomen pelvis : L2-S1 posterior spinal fusion hardware appears intact. L4 lobectomy with interbody strut is in place. Advanced degenerative disc endplate changes  are present at the L1-2 level. No acute osseous abnormalities are identified.  L-spine MRI 2016: Multilevel degenerative disc disease and degenerative facet change as well as  multilevel postoperative changes detailed above.  There is a 14 mm of anterolisthesis of L4 on L5 which has worsened from the prior exam where this was only 4 mm.  This results in severe bilateral neural foraminal narrowing but no spinal canal stenosis.    Pain Assessment   Cause of Pain: surgery  Location of Pain: Lower Back, R Hip, L Hip, R Leg, L Leg  Description of Pain: Dull/Aching, Throbbing, Stabbing  Previous Pain Rating : 7  Current Pain Ratin  Aggravating Factors: Activity  Alleviating Factors: Rest, Medication  Previous Treatments: Epidural Steroids, Narcotic Pain Medication, Physical Therapy  Previous Diagnostic Studies: MRI   PEG Assessment   What number best describes your pain on average in the past week?7  What number best describes how, during the past week, pain has interfered with your enjoyment of life?6  What number  best describes how, during the past week, pain has interfered with your general activity? 10     has a past medical history of Arthritis, Depression, Low back pain, Osteoporosis, and Spinal stenosis (2017).     has a past surgical history that includes Cervical Curettage; Anterior cervical discectomy; Total vaginal hysterectomy; Appendectomy; Back surgery (); and Mediastinotomy for exploration / drainage / biopsy / removal Fb w/ cervical approach (2011).     Social History     Tobacco Use   • Smoking status: Former Smoker     Packs/day: 0.50     Years: 30.00     Pack years: 15.00     Types: Cigarettes     Quit date:      Years since quittin.3   • Smokeless tobacco: Never Used   Substance Use Topics   • Alcohol use: No     Review of Systems        See HPI      Vitals:    22 0838   BP: 117/68   Pulse: 83   Resp: 16   SpO2: 97%     Physical Exam  Vitals reviewed.   Constitutional:       General: She is not in acute distress.  Pulmonary:      Effort: Pulmonary effort is normal.   Musculoskeletal:      Lumbar back: Tenderness present. Positive right straight leg raise test.      Comments: Lumbar loading positive, pain on extension of low back past 5 degrees.  TTP on the lumbar facets noted.       PHQ 9 on chart                     opioid risk tool low risk      Assessment /Plan     Diagnoses and all orders for this visit:    1. Chronic pain syndrome (Primary)  -     HYDROcodone-acetaminophen (NORCO) 7.5-325 MG per tablet; Take 1 tablet by mouth 3 (Three) Times a Day As Needed for Severe Pain .  Dispense: 90 tablet; Refill: 0  -     HYDROcodone-acetaminophen (NORCO) 7.5-325 MG per tablet; Take 1 tablet by mouth 3 (Three) Times a Day As Needed for Severe Pain . DNF before 2022  Dispense: 90 tablet; Refill: 0    2. Postlaminectomy syndrome of lumbar region    3. Lumbosacral spondylosis without myelopathy    4. Polyarthralgia    5. High risk medication use      Summary   67-year-old female with  chronic back pain here for follow-up.    chronic pain from lumbar DDD spondylosis with radicular pain.  Chronic polyarthralgia.  Repeat right L4-L5 transforaminal JESSICA or SI injection as needed.    Good relief and functional benefit of hydrocodone denies any side effects.  Slight worsening of back pain and right extremity radicular pain.  Declines injection at this time.  She will call to schedule when ready    Continue hydrocodone 7.5/25 3 times daily.  UDS and inspect reviewed.  Discussed risk of tolerance, dependence, respiratory depression, coma and death associated with use of oral opioids for treatment of chronic nonmalignant pain.      Continue gabapentin/flexeril.  Cont. topical compounded neuropathic/anti-inflammatory cream with ketamine.      RTC 2 mo

## 2022-05-11 RX ORDER — DULOXETIN HYDROCHLORIDE 60 MG/1
CAPSULE, DELAYED RELEASE ORAL
Qty: 90 CAPSULE | Refills: 1 | Status: SHIPPED | OUTPATIENT
Start: 2022-05-11

## 2022-05-19 DIAGNOSIS — Z79.899 HIGH RISK MEDICATION USE: Primary | ICD-10-CM

## 2022-05-23 ENCOUNTER — OFFICE VISIT (OUTPATIENT)
Dept: ORTHOPEDIC SURGERY | Facility: CLINIC | Age: 67
End: 2022-05-23

## 2022-05-23 VITALS
HEART RATE: 88 BPM | DIASTOLIC BLOOD PRESSURE: 73 MMHG | SYSTOLIC BLOOD PRESSURE: 114 MMHG | HEIGHT: 63 IN | WEIGHT: 230 LBS | BODY MASS INDEX: 40.75 KG/M2

## 2022-05-23 DIAGNOSIS — M17.12 PRIMARY OSTEOARTHRITIS OF LEFT KNEE: Primary | ICD-10-CM

## 2022-05-23 PROCEDURE — 99214 OFFICE O/P EST MOD 30 MIN: CPT | Performed by: FAMILY MEDICINE

## 2022-05-23 NOTE — PROGRESS NOTES
Primary Care Sports Medicine Office Visit Note     Patient ID: Sherrie Chand is a 67 y.o. female.    Chief Complaint:  Chief Complaint   Patient presents with   • Left Knee - Pain, Follow-up     HPI:    Ms. Sherrie Chand is a 67 y.o. female. The patient presents to the clinic today for a 3-month follow-up of left knee pain.    The patient states her knee is worse. She states she is not getting out like she is used to because of the pain. She reports that the steroid injection seemed to last 2 days. The patient questions if viscosupplementation might help.    Past Medical History:   Diagnosis Date   • Arthritis    • Depression    • Low back pain    • Osteoporosis    • Spinal stenosis 2017       Past Surgical History:   Procedure Laterality Date   • ANTERIOR CERVICAL DISCECTOMY      C3-C4/Ant. plating C3-C6 for non-union-[facility]ed from [facility]   • APPENDECTOMY     • BACK SURGERY  2017    Interior and posterior-Abstracted from [facility]   • CERVICAL CORPECTOMY      C5   • MEDIASTINOTOMY FOR EXPLORATION / DRAINAGE / BIOPSY / REMOVAL FB W/ CERVICAL APPROACH  2011    Removal of cervical plate-Abstracted from [facility]   • VAGINAL HYSTERECTOMY         Family History   Problem Relation Age of Onset   • Arthritis Mother    • Arthritis Father    • Lung cancer Father    • Other Father         Other cancer   • Diabetes Father    • Arthritis Maternal Grandmother    • Lung cancer Other      Social History     Occupational History   • Not on file   Tobacco Use   • Smoking status: Former Smoker     Packs/day: 0.50     Years: 30.00     Pack years: 15.00     Types: Cigarettes     Quit date:      Years since quittin.3   • Smokeless tobacco: Never Used   Vaping Use   • Vaping Use: Former   Substance and Sexual Activity   • Alcohol use: No   • Drug use: No   • Sexual activity: Yes     Partners: Male     Birth control/protection: None      Review of Systems   Constitutional: Negative for activity change and fever.  "  Musculoskeletal: Positive for arthralgias.   Skin: Negative for color change and rash.   Neurological: Negative for weakness.     Objective:    /73   Pulse 88   Ht 160 cm (63\")   Wt 104 kg (230 lb)   LMP  (LMP Unknown)   BMI 40.74 kg/m²     Physical Examination:  Physical Exam  Vitals and nursing note reviewed.   Constitutional:       General: She is not in acute distress.     Appearance: She is well-developed. She is not diaphoretic.   HENT:      Head: Normocephalic and atraumatic.   Eyes:      Conjunctiva/sclera: Conjunctivae normal.   Pulmonary:      Effort: Pulmonary effort is normal. No respiratory distress.   Musculoskeletal:      Left knee: No effusion.      Instability Tests: Medial Jacob test negative and lateral Jacob test negative.   Skin:     General: Skin is warm.      Capillary Refill: Capillary refill takes less than 2 seconds.   Neurological:      Mental Status: She is alert.       Left Ankle Exam     Range of Motion   The patient has normal left ankle ROM.       Left Knee Exam     Muscle Strength   The patient has normal left knee strength.    Tenderness   The patient is experiencing tenderness in the lateral joint line, medial joint line and patella.    Range of Motion   Extension: normal   Flexion: normal     Tests   Jacob:  Medial - negative Lateral - negative  Varus: negative Valgus: negative  Left knee patellar apprehension test: +patellar grind testing, +kaiden eric testing.    Other   Erythema: absent  Sensation: normal  Pulse: present (distal to the knee, DP and PT palpable)  Swelling: none  Effusion: no effusion present        Imaging and other tests:  No new imaging today.     Assessment and Plan:    1. Primary osteoarthritis of left knee  - Visco Treatment; Future    1. Primary osteoarthritis left knee  I discussed pathology and treatment options with the patient today. She has failed corticosteroid injection with little to no relief. She has also failed stretching " and strengthening activity, anti-inflammatory, and multiple other medications. We'll proceed to prior authorization of hyaluronic acid. We will call patient with prior authorization results, and hopefully have her return in near 1 month for aspiration and hyaluronic acid injection.     Transcribed from ambient dictation for Sarmad Long II, DO by Shayy Nolan.  05/23/22   11:25 EDT    Patient verbalized consent to the visit recording.    Disclaimer: Please note that areas of this note were completed with computer voice recognition software.  Quite often unanticipated grammatical, syntax, homophones, and other interpretive errors are inadvertently transcribed by the computer software. Please excuse any errors that have escaped final proofreading.

## 2022-05-28 RX ORDER — ATORVASTATIN CALCIUM 40 MG/1
TABLET, FILM COATED ORAL
Qty: 90 TABLET | Refills: 3 | Status: SHIPPED | OUTPATIENT
Start: 2022-05-28 | End: 2022-05-30

## 2022-05-30 RX ORDER — ATORVASTATIN CALCIUM 40 MG/1
TABLET, FILM COATED ORAL
Qty: 90 TABLET | Refills: 3 | Status: SHIPPED | OUTPATIENT
Start: 2022-05-30

## 2022-06-01 ENCOUNTER — CLINICAL SUPPORT (OUTPATIENT)
Dept: FAMILY MEDICINE CLINIC | Facility: CLINIC | Age: 67
End: 2022-06-01

## 2022-06-01 DIAGNOSIS — Z79.899 HIGH RISK MEDICATION USE: ICD-10-CM

## 2022-06-01 LAB
ALBUMIN SERPL-MCNC: 4.2 G/DL (ref 3.5–5.2)
ALBUMIN/GLOB SERPL: 1.8 G/DL
ALP SERPL-CCNC: 80 U/L (ref 39–117)
ALT SERPL W P-5'-P-CCNC: 25 U/L (ref 1–33)
ANION GAP SERPL CALCULATED.3IONS-SCNC: 12.2 MMOL/L (ref 5–15)
AST SERPL-CCNC: 26 U/L (ref 1–32)
BACTERIA UR QL AUTO: ABNORMAL /HPF
BASOPHILS # BLD AUTO: 0.04 10*3/MM3 (ref 0–0.2)
BASOPHILS NFR BLD AUTO: 0.4 % (ref 0–1.5)
BILIRUB SERPL-MCNC: 0.3 MG/DL (ref 0–1.2)
BILIRUB UR QL STRIP: NEGATIVE
BUN SERPL-MCNC: 14 MG/DL (ref 8–23)
BUN/CREAT SERPL: 15.6 (ref 7–25)
CALCIUM SPEC-SCNC: 9.9 MG/DL (ref 8.6–10.5)
CHLORIDE SERPL-SCNC: 103 MMOL/L (ref 98–107)
CLARITY UR: CLEAR
CO2 SERPL-SCNC: 25.8 MMOL/L (ref 22–29)
COLOR UR: YELLOW
CREAT SERPL-MCNC: 0.9 MG/DL (ref 0.57–1)
DEPRECATED RDW RBC AUTO: 43.3 FL (ref 37–54)
EGFRCR SERPLBLD CKD-EPI 2021: 71.1 ML/MIN/1.73
EOSINOPHIL # BLD AUTO: 0.15 10*3/MM3 (ref 0–0.4)
EOSINOPHIL NFR BLD AUTO: 1.4 % (ref 0.3–6.2)
ERYTHROCYTE [DISTWIDTH] IN BLOOD BY AUTOMATED COUNT: 12.9 % (ref 12.3–15.4)
GLOBULIN UR ELPH-MCNC: 2.3 GM/DL
GLUCOSE SERPL-MCNC: 105 MG/DL (ref 65–99)
GLUCOSE UR STRIP-MCNC: NEGATIVE MG/DL
HCT VFR BLD AUTO: 37.5 % (ref 34–46.6)
HGB BLD-MCNC: 12.7 G/DL (ref 12–15.9)
HGB UR QL STRIP.AUTO: NEGATIVE
HYALINE CASTS UR QL AUTO: ABNORMAL /LPF
IMM GRANULOCYTES # BLD AUTO: 0.08 10*3/MM3 (ref 0–0.05)
IMM GRANULOCYTES NFR BLD AUTO: 0.8 % (ref 0–0.5)
KETONES UR QL STRIP: ABNORMAL
LEUKOCYTE ESTERASE UR QL STRIP.AUTO: ABNORMAL
LYMPHOCYTES # BLD AUTO: 1.86 10*3/MM3 (ref 0.7–3.1)
LYMPHOCYTES NFR BLD AUTO: 17.7 % (ref 19.6–45.3)
MCH RBC QN AUTO: 31.3 PG (ref 26.6–33)
MCHC RBC AUTO-ENTMCNC: 33.9 G/DL (ref 31.5–35.7)
MCV RBC AUTO: 92.4 FL (ref 79–97)
MONOCYTES # BLD AUTO: 0.84 10*3/MM3 (ref 0.1–0.9)
MONOCYTES NFR BLD AUTO: 8 % (ref 5–12)
NEUTROPHILS NFR BLD AUTO: 7.53 10*3/MM3 (ref 1.7–7)
NEUTROPHILS NFR BLD AUTO: 71.7 % (ref 42.7–76)
NITRITE UR QL STRIP: POSITIVE
NRBC BLD AUTO-RTO: 0 /100 WBC (ref 0–0.2)
PH UR STRIP.AUTO: 6.5 [PH] (ref 5–8)
PLATELET # BLD AUTO: 347 10*3/MM3 (ref 140–450)
PMV BLD AUTO: 11 FL (ref 6–12)
POTASSIUM SERPL-SCNC: 4.7 MMOL/L (ref 3.5–5.2)
PROT SERPL-MCNC: 6.5 G/DL (ref 6–8.5)
PROT UR QL STRIP: ABNORMAL
RBC # BLD AUTO: 4.06 10*6/MM3 (ref 3.77–5.28)
RBC # UR STRIP: ABNORMAL /HPF
REF LAB TEST METHOD: ABNORMAL
SODIUM SERPL-SCNC: 141 MMOL/L (ref 136–145)
SP GR UR STRIP: 1.02 (ref 1–1.03)
SQUAMOUS #/AREA URNS HPF: ABNORMAL /HPF
UROBILINOGEN UR QL STRIP: ABNORMAL
WBC # UR STRIP: ABNORMAL /HPF
WBC NRBC COR # BLD: 10.5 10*3/MM3 (ref 3.4–10.8)

## 2022-06-01 PROCEDURE — 87088 URINE BACTERIA CULTURE: CPT | Performed by: PREVENTIVE MEDICINE

## 2022-06-01 PROCEDURE — 81001 URINALYSIS AUTO W/SCOPE: CPT | Performed by: PREVENTIVE MEDICINE

## 2022-06-01 PROCEDURE — 87186 SC STD MICRODIL/AGAR DIL: CPT

## 2022-06-01 PROCEDURE — 36415 COLL VENOUS BLD VENIPUNCTURE: CPT | Performed by: PREVENTIVE MEDICINE

## 2022-06-01 PROCEDURE — 80053 COMPREHEN METABOLIC PANEL: CPT | Performed by: PREVENTIVE MEDICINE

## 2022-06-01 PROCEDURE — 87086 URINE CULTURE/COLONY COUNT: CPT | Performed by: PREVENTIVE MEDICINE

## 2022-06-01 PROCEDURE — 85025 COMPLETE CBC W/AUTO DIFF WBC: CPT | Performed by: PREVENTIVE MEDICINE

## 2022-06-01 NOTE — PROGRESS NOTES
Venipuncture Blood Specimen Collection  Venipuncture performed in left arm by Karol Maxwell MA with good hemostasis. Patient tolerated the procedure well without complications.   06/01/22   Karol Maxwell MA

## 2022-06-03 DIAGNOSIS — N39.0 URINARY TRACT INFECTION WITHOUT HEMATURIA, SITE UNSPECIFIED: Primary | ICD-10-CM

## 2022-06-03 LAB — BACTERIA SPEC AEROBE CULT: ABNORMAL

## 2022-06-03 RX ORDER — NITROFURANTOIN 25; 75 MG/1; MG/1
100 CAPSULE ORAL 2 TIMES DAILY
Qty: 14 CAPSULE | Refills: 0 | Status: SHIPPED | OUTPATIENT
Start: 2022-06-03 | End: 2022-06-23

## 2022-06-03 NOTE — PROGRESS NOTES
Urine is still infected so we have sent some antibiotic that should take care of it to [facility] in [place].  After you are off the medicine for at least 3 days we need to reculture your urine here at the clinic call if you have any questions or concerns rest and drink plenty of fluids if develops fever or flank pain needs to go to the emergency room.

## 2022-06-06 RX ORDER — SULINDAC 200 MG/1
200 TABLET ORAL 2 TIMES DAILY
Qty: 180 TABLET | Refills: 0 | Status: SHIPPED | OUTPATIENT
Start: 2022-06-06 | End: 2022-08-09 | Stop reason: SDUPTHER

## 2022-06-23 ENCOUNTER — OFFICE VISIT (OUTPATIENT)
Dept: PAIN MEDICINE | Facility: CLINIC | Age: 67
End: 2022-06-23

## 2022-06-23 VITALS
RESPIRATION RATE: 16 BRPM | HEIGHT: 63 IN | SYSTOLIC BLOOD PRESSURE: 114 MMHG | OXYGEN SATURATION: 82 % | DIASTOLIC BLOOD PRESSURE: 69 MMHG | HEART RATE: 96 BPM | BODY MASS INDEX: 40.75 KG/M2 | WEIGHT: 230 LBS

## 2022-06-23 DIAGNOSIS — M47.817 LUMBOSACRAL SPONDYLOSIS WITHOUT MYELOPATHY: ICD-10-CM

## 2022-06-23 DIAGNOSIS — G89.4 CHRONIC PAIN SYNDROME: ICD-10-CM

## 2022-06-23 DIAGNOSIS — M96.1 POSTLAMINECTOMY SYNDROME OF LUMBAR REGION: ICD-10-CM

## 2022-06-23 DIAGNOSIS — Z79.899 HIGH RISK MEDICATION USE: Primary | ICD-10-CM

## 2022-06-23 DIAGNOSIS — M25.50 POLYARTHRALGIA: ICD-10-CM

## 2022-06-23 PROCEDURE — 99214 OFFICE O/P EST MOD 30 MIN: CPT | Performed by: ANESTHESIOLOGY

## 2022-06-23 RX ORDER — HYDROCODONE BITARTRATE AND ACETAMINOPHEN 7.5; 325 MG/1; MG/1
1 TABLET ORAL 3 TIMES DAILY PRN
Qty: 90 TABLET | Refills: 0 | Status: SHIPPED | OUTPATIENT
Start: 2022-08-01 | End: 2022-08-31 | Stop reason: SDUPTHER

## 2022-06-23 RX ORDER — HYDROCODONE BITARTRATE AND ACETAMINOPHEN 7.5; 325 MG/1; MG/1
1 TABLET ORAL 3 TIMES DAILY PRN
Qty: 90 TABLET | Refills: 0 | Status: SHIPPED | OUTPATIENT
Start: 2022-07-02 | End: 2022-08-31 | Stop reason: SDUPTHER

## 2022-06-23 NOTE — PROGRESS NOTES
CC  hip/buttock, right leg, lower back pain  67-year-old female with chronic back pain/postlaminectomy syndrome history of fusion L2 to to S1, here for follow-up.  Worsening left knee pain, seen Ortho/Ethan, marginal relief with steroid injection.  Planning gel injection.  Chronic back pain radiating to bilateral lower extremity worse on the left with aching numbness in both legs worse with standing walking or activity.  Denies new weakness saddle anesthesia bladder bowel incontinence.    Pain interfering with daily activity and sleep.  Marginal relief with physical therapy or anti-inflammatories.  Marginal relief with caudal JESSICA in the past.  Declines LESI due to past experience with post dural puncture headache.  Mild relief with transforaminal JESSICA.      Utilizes hydrocodone with good relief functional benefit and side effects.      CT abdomen pelvis : L2-S1 posterior spinal fusion hardware appears intact. L4 lobectomy with interbody strut is in place. Advanced degenerative disc endplate changes  are present at the L1-2 level. No acute osseous abnormalities are identified.  L-spine MRI 2016: Multilevel degenerative disc disease and degenerative facet change as well as  multilevel postoperative changes detailed above.  There is a 14 mm of anterolisthesis of L4 on L5 which has worsened from the prior exam where this was only 4 mm.  This results in severe bilateral neural foraminal narrowing but no spinal canal stenosis.    Pain Assessment   Cause of Pain: surgery  Location of Pain: Lower Back, R Hip, L Hip, R Leg, L Leg  Description of Pain: Dull/Aching, Throbbing, Stabbing  Previous Pain Rating : 7  Current Pain Ratin  Aggravating Factors: Activity  Alleviating Factors: Rest, Medication  Previous Treatments: Epidural Steroids, Narcotic Pain Medication, Physical Therapy  Previous Diagnostic Studies: MRI   PEG Assessment   What number best describes your pain on average in the past week?7  What number best  describes how, during the past week, pain has interfered with your enjoyment of life?3  What number best describes how, during the past week, pain has interfered with your general activity? 10     has a past medical history of Arthritis, Depression, Low back pain, Osteoporosis, and Spinal stenosis (2017).     has a past surgical history that includes Cervical Curettage; Anterior cervical discectomy; Total vaginal hysterectomy; Appendectomy; Back surgery (); and Mediastinotomy for exploration / drainage / biopsy / removal Fb w/ cervical approach (2011).     Social History     Tobacco Use   • Smoking status: Former Smoker     Packs/day: 0.50     Years: 30.00     Pack years: 15.00     Types: Cigarettes     Quit date:      Years since quittin.4   • Smokeless tobacco: Never Used   Substance Use Topics   • Alcohol use: No     Review of Systems        See HPI      Vitals:    22 0830   BP: 114/69   Pulse: 96   Resp: 16   SpO2: (!) 82%     Physical Exam  Vitals reviewed.   Constitutional:       General: She is not in acute distress.  Pulmonary:      Effort: Pulmonary effort is normal.   Musculoskeletal:      Lumbar back: Tenderness present. Positive right straight leg raise test.      Comments: Lumbar loading positive, pain on extension of low back past 5 degrees.  TTP on the lumbar facets noted.       PHQ 9 on chart                     opioid risk tool low risk      Assessment /Plan     Diagnoses and all orders for this visit:    1. Chronic pain syndrome (Primary)  -     HYDROcodone-acetaminophen (NORCO) 7.5-325 MG per tablet; Take 1 tablet by mouth 3 (Three) Times a Day As Needed for Severe Pain .  Dispense: 90 tablet; Refill: 0  -     HYDROcodone-acetaminophen (NORCO) 7.5-325 MG per tablet; Take 1 tablet by mouth 3 (Three) Times a Day As Needed for Severe Pain . DNF before 2022  Dispense: 90 tablet; Refill: 0    2. Postlaminectomy syndrome of lumbar region    3. Lumbosacral spondylosis without  myelopathy    4. Polyarthralgia    5. High risk medication use      Summary   67-year-old female with chronic back pain here for follow-up.    chronic pain from lumbar DDD spondylosis with radicular pain.  Chronic polyarthralgia.  Repeat right L4-L5 transforaminal JESSICA or SI injection as needed.    Worsening left knee pain, seen Ortho/Ethan, marginal relief with steroid injection.  Planning gel injection.  Continues to have good relief and functional benefit with hydrocodone denies side effects.    Continue hydrocodone 7.5/25 3 times daily.  UDS and inspect reviewed.  Discussed risk of tolerance, dependence, respiratory depression, coma and death associated with use of oral opioids for treatment of chronic nonmalignant pain.      Continue gabapentin/flexeril.  Cont. topical compounded neuropathic/anti-inflammatory cream with ketamine.      RTC 2 mo

## 2022-06-27 NOTE — PROGRESS NOTES
The ABCs of the Annual Wellness Visit  Initial Medicare Wellness Visit  Patient is also here for her age-specific physical and has been advised to wear sunscreen and a seatbelt she is also here to follow-up on multiple chronic health conditions most of which are stable the exception is the repeated urinary tract infections she was recultured today but since she has had several since the beginning of the year we will refer her to the urologist.  She was again encouraged to do the Cologuard and to follow-up 1 more time with the gynecologist for repeat cervical cancer screening.  Chief Complaint   Patient presents with   • Hyperlipidemia     Annual    • Hypertension     Annual    • Medicare Wellness-subsequent     Annual      Subjective   History of Present Illness:  Sherrie Chand is a 67 y.o. female who presents for an Initial Medicare Wellness Visit.    The following portions of the patient's history were reviewed and   updated as appropriate: allergies, current medications, past family history, past medical history, past social history, past surgical history and problem list.     Compared to one year ago, the patient feels her physical   health is worse.    Compared to one year ago, the patient feels her mental   health is the same.    Recent Hospitalizations:  She was not admitted to the hospital during the last year.       Current Medical Providers:  Patient Care Team:  Ban Alexander MD as PCP - General (Family Medicine)  Sandra Pak MD as Consulting Physician (Pain Medicine)    Outpatient Medications Prior to Visit   Medication Sig Dispense Refill   • atorvastatin (LIPITOR) 40 MG tablet TAKE 1 TABLET EVERY NIGHT 90 tablet 3   • Calcium Carbonate-Vitamin D 600-400 MG-UNIT chewable tablet Chew 1 tablet Daily.     • cyclobenzaprine (FLEXERIL) 10 MG tablet      • DULoxetine (CYMBALTA) 60 MG capsule TAKE 1 CAPSULE EVERY DAY 90 capsule 1   • gabapentin (NEURONTIN) 800 MG tablet TAKE 1 TABLET BY MOUTH  3 TIMES A DAY 90 tablet 11   • [START ON 7/2/2022] HYDROcodone-acetaminophen (NORCO) 7.5-325 MG per tablet Take 1 tablet by mouth 3 (Three) Times a Day As Needed for Severe Pain . 90 tablet 0   • [START ON 8/1/2022] HYDROcodone-acetaminophen (NORCO) 7.5-325 MG per tablet Take 1 tablet by mouth 3 (Three) Times a Day As Needed for Severe Pain . DNF before 8/1/2022 90 tablet 0   • lisinopril-hydrochlorothiazide (PRINZIDE,ZESTORETIC) 20-25 MG per tablet TAKE 1 TABLET EVERY DAY 90 tablet 1   • Misc. Devices (Bariatric Rollator) misc 1 each Daily. Indications: back pain and needs seat to rest forwalking. 1 each 0   • St Johns Wort 150 MG capsule Take 1 capsule by mouth Daily.     • sulindac (CLINORIL) 200 MG tablet Take 1 tablet by mouth 2 (Two) Times a Day. 180 tablet 0   • vitamin B-6 (PYRIDOXINE) 100 MG tablet Take 300 mg by mouth Daily.       No facility-administered medications prior to visit.       Opioid medication/s are on active medication list.  and I have evaluated her active treatment plan and pain score trends (see table).  There were no vitals filed for this visit.  I have reviewed the chart for potential of high risk medication and harmful drug interactions in the elderly.            Aspirin is not on active medication list.  Aspirin use is not indicated based on review of current medical condition/s. Risk of harm outweighs potential benefits.  .    Patient Active Problem List   Diagnosis   • Anxiety   • Arthritis   • B12 deficiency   • Chronic pain   • Constipation due to pain medication   • Degeneration of lumbar or lumbosacral intervertebral disc   • Depression   • Family history of lung cancer   • Former smoker   • Hyperglycemia   • Hyperlipidemia   • Hypertension   • Osteoarthritis of lumbosacral spine without myelopathy   • Other long term (current) drug therapy   • Vitamin D deficiency   • Astigmatism   • Pseudoarthrosis of lumbar spine   • S/P lumbar fusion   • Senile nuclear sclerosis   • Spinal  "stenosis   • Tear film insufficiency   • Spondylolisthesis at L4-L5 level   • Disease of bronchial airway ([facility])   • Multiple thyroid nodules   • Osteoporosis   • Morbid (severe) obesity due to excess calories ([facility])   • Class 3 severe obesity due to excess calories with serious comorbidity and body mass index (BMI) of 40.0 to 44.9 in adult ([facility])     Advance Care Planning  Advance Directive is not on file.  ACP discussion was held with the patient during this visit. Patient does not have an advance directive, information provided.    Review of Systems   Genitourinary: Positive for dysuria.   Musculoskeletal: Positive for arthralgias, gait problem and joint swelling.   Psychiatric/Behavioral: Positive for dysphoric mood.        Objective       Vitals:    06/28/22 0826 06/28/22 0827 06/28/22 0828   BP: 131/74 116/66 125/78   BP Location: Right arm Left arm Right arm   Patient Position: Sitting Sitting Standing   Cuff Size: Adult Adult Adult   Pulse: 85     Temp: 96.4 °F (35.8 °C)     TempSrc: Temporal     SpO2: 97%     Weight: 106 kg (233 lb)     Height: 160 cm (63\")       Estimated body mass index is 41.27 kg/m² as calculated from the following:    Height as of this encounter: 160 cm (63\").    Weight as of this encounter: 106 kg (233 lb).    Class 3 Severe Obesity (BMI >=40). Obesity-related health conditions include the following: hypertension and dyslipidemias. Obesity is unchanged. BMI is is above average; BMI management plan is completed. We discussed portion control and increasing exercise.      Does the patient have evidence of cognitive impairment? No    Physical Exam  Vitals reviewed.   Constitutional:       General: She is not in acute distress.     Appearance: She is well-developed. She is obese. She is not ill-appearing or toxic-appearing.   HENT:      Head: Normocephalic and atraumatic.      Right Ear: Tympanic membrane, ear canal and external ear normal.      Left Ear: Tympanic membrane, ear canal and " external ear normal.      Nose: Nose normal.      Mouth/Throat:      Mouth: Mucous membranes are moist.      Pharynx: No posterior oropharyngeal erythema.   Eyes:      Extraocular Movements: Extraocular movements intact.      Conjunctiva/sclera: Conjunctivae normal.      Pupils: Pupils are equal, round, and reactive to light.   Cardiovascular:      Rate and Rhythm: Normal rate and regular rhythm.      Heart sounds: Normal heart sounds.   Pulmonary:      Effort: Pulmonary effort is normal.   Abdominal:      General: Bowel sounds are normal. There is no distension.      Palpations: Abdomen is soft. There is no mass.      Tenderness: There is no abdominal tenderness. There is no right CVA tenderness or left CVA tenderness.   Musculoskeletal:         General: Swelling and tenderness present. No signs of injury.      Cervical back: Neck supple.   Skin:     General: Skin is warm.   Neurological:      General: No focal deficit present.      Mental Status: She is alert and oriented to person, place, and time.   Psychiatric:         Mood and Affect: Mood normal.         Behavior: Behavior normal.               HEALTH RISK ASSESSMENT    Smoking Status:  Social History     Tobacco Use   Smoking Status Former Smoker   • Packs/day: 0.50   • Years: 30.00   • Pack years: 15.00   • Types: Cigarettes   • Quit date:    • Years since quittin.4   Smokeless Tobacco Never Used     Alcohol Consumption:  Social History     Substance and Sexual Activity   Alcohol Use No     Fall Risk Screen:    [facility], and patient is at HIGH risk for falls. Assessment completed on:2022    Depression Screen:   PHQ-2/PHQ-9 Depression Screening 2022   Retired PHQ-9 Total Score -   Retired Total Score -   Little Interest or Pleasure in Doing Things 1-->several days   Feeling Down, Depressed or Hopeless 1-->several days   Trouble Falling or Staying Asleep, or Sleeping Too Much 0-->not at all   Feeling Tired or  Having Little Energy 1-->several days   Poor Appetite or Overeating 0-->not at all   Feeling Bad about Yourself - or that You are a Failure or Have Let Yourself or Your Family Down 1-->several days   Trouble Concentrating on Things, Such as Reading the Newspaper or Watching Television 0-->not at all   Moving or Speaking So Slowly that Other People Could Have Noticed? Or the Opposite - Being So Fidgety 0-->not at all   Thoughts that You Would be Better Off Dead or of Hurting Yourself in Some Way 0-->not at all   PHQ-9: Brief Depression Severity Measure Score 4   If You Checked Off Any Problems, How Difficult Have These Problems Made It For You to Do Your Work, Take Care of Things at Home, or Get Along with Other People? somewhat difficult       Health Habits and Functional and Cognitive Screening:  Functional & Cognitive Status 6/28/2022   Do you have difficulty preparing food and eating? No   Do you have difficulty bathing yourself, getting dressed or grooming yourself? Yes   Do you have difficulty using the toilet? No   Do you have difficulty moving around from place to place? No   Do you have trouble with steps or getting out of a bed or a chair? No   Current Diet Well Balanced Diet   Dental Exam Up to date   Eye Exam Not up to date   Exercise (times per week) 3 times per week   Current Exercises Include Walking   Current Exercise Activities Include -   Do you need help using the phone?  No   Are you deaf or do you have serious difficulty hearing?  No   Do you need help with transportation? Yes   Do you need help shopping? Yes   Do you need help preparing meals?  No   Do you need help with housework?  Yes   Do you need help with laundry? No   Do you need help taking your medications? No   Do you need help managing money? No   Do you ever drive or ride in a car without wearing a seat belt? No   Have you felt unusual stress, anger or loneliness in the last month? Yes   Who do you live with? Spouse   If you need  help, do you have trouble finding someone available to you? No   Have you been bothered in the last four weeks by sexual problems? No   Do you have difficulty concentrating, remembering or making decisions? No       Age-appropriate Screening Schedule:  Refer to the list below for future screening recommendations based on patient's age, sex and/or medical conditions. Orders for these recommended tests are listed in the plan section. The patient has been provided with a written plan.    Health Maintenance   Topic Date Due   • TDAP/TD VACCINES (1 - Tdap) Never done   • ZOSTER VACCINE (1 of 2) Never done   • DXA SCAN  10/31/2021   • PAP SMEAR  03/12/2022   • MAMMOGRAM  07/13/2022   • INFLUENZA VACCINE  10/01/2022   • LIPID PANEL  01/05/2023            Assessment & Plan   CMS Preventative Services Quick Reference  Risk Factors Identified During Encounter  Obesity/Overweight   The above risks/problems have been discussed with the patient.  Follow up actions/plans if indicated are seen below in the Assessment/Plan Section.  Pertinent information has been shared with the patient in the After Visit Summary.    Diagnoses and all orders for this visit:    1. Encounter for annual general medical examination with abnormal findings in adult (Primary)  Comments:  Patient has been advised to wear sunscreen and a seatbelt.    2. Screening for colon cancer  -     Cologuard - Stool, Per Rectum; Future    3. Post-menopausal    4. Screening for cervical cancer  -     Cancel: Ambulatory Referral to Gynecology    5. Age related osteoporosis, unspecified pathological fracture presence  Comments:  Patient advised to get 20 minutes of weightbearing exercise daily.    6. Multiple thyroid nodules  Comments:  Patient missed her ultrasound so we have ordered it again and she will follow-up with the endocrinologist.  Orders:  -     TSH; Future  -     US Thyroid; Future    7. Primary hypertension  Comments:  Controlled.  Orders:  -     CBC Auto  Differential; Future  -     Comprehensive Metabolic Panel; Future    8. Hyperlipidemia, unspecified hyperlipidemia type  Comments:  Patient watching her saturated fats  Orders:  -     Lipid Panel; Future    9. Hyperglycemia  Comments:  Patient is trying to watch her carbohydrates  Orders:  -     Hemoglobin A1c; Future    10. Disease of bronchial airway (HCC)  Comments:  Patient will call back if breathing worsens.    11. Diarrhea of presumed infectious origin  Comments:  gone    12. Depression, unspecified depression type  Comments:  Since pain Left knee has been down  Orders:  -     Magnesium; Future    13. B12 deficiency  Comments:  Takes daily  Orders:  -     Vitamin B12; Future    14. Frequent UTI  Comments:  Follow-up urine to make sure UTI is clear today and will refer to urology due to multiple urinary tract infections  Orders:  -     Ambulatory Referral to Urology    15. Urinary tract infection without hematuria, site unspecified  Comments:  Repeat follow-up urine 3 days off medicine today.  Orders:  -     Urine Culture - Urine, Urine, Clean Catch    16. Class 3 severe obesity due to excess calories with serious comorbidity and body mass index (BMI) of 40.0 to 44.9 in adult ([facility])        Follow Up:  Return in about 6 years (around 6/28/2028), or needs PAP over next 6 weeks.     An After Visit Summary and PPPS were made available to the patient.

## 2022-06-27 NOTE — PATIENT INSTRUCTIONS
Health Maintenance Due   Topic Date Due    COLORECTAL CANCER SCREENING  Never done    TDAP/TD VACCINES (1 - Tdap) Never done    ZOSTER VACCINE (1 of 2) Never done    DXA SCAN  10/31/2021    PAP SMEAR  03/12/2022    ANNUAL WELLNESS VISIT  06/24/2022    Patient to visit [employer] website (https://www.ibanet.org/) for information reguarding advanced directive and living will.  Advance Directive       Advance directives are legal documents that let you make choices ahead of time about your health care and medical treatment in case you become unable to communicate for yourself. Advance directives are a way for you to communicate your wishes to family, friends, and health care providers. This can help convey your decisions about end-of-life care if you become unable to communicate.  Discussing and writing advance directives should happen over time rather than all at once. Advance directives can be changed depending on your situation and what you want, even after you have signed the advance directives.  If you do not have an advance directive, some states assign family decision makers to act on your behalf based on how closely you are related to them. Each state has its own laws regarding advance directives. You may want to check with your health care provider, , or state representative about the laws in your state. There are different types of advance directives, such as:  Medical power of .  Living will.  Do not resuscitate (DNR) or do not attempt resuscitation (DNAR) order.  Health care proxy and medical power of   A health care proxy, also called a health care agent, is a person who is appointed to make medical decisions for you in cases in which you are unable to make the decisions yourself. Generally, people choose someone they know well and trust to represent their preferences. Make sure to ask this person for an agreement to act as your proxy. A proxy may have to exercise  judgment in the event of a medical decision for which your wishes are not known.  A medical power of  is a legal document that names your health care proxy. Depending on the laws in your state, after the document is written, it may also need to be:  Signed.  Notarized.  Dated.  Copied.  Witnessed.  Incorporated into your medical record.  You may also want to appoint someone to manage your financial affairs in a situation in which you are unable to do so. This is called a durable power of  for finances. It is a separate legal document from the durable power of  for health care. You may choose the same person or someone different from your health care proxy to act as your agent in financial matters.  If you do not appoint a proxy, or if there is a concern that the proxy is not acting in your best interests, a court-appointed guardian may be designated to act on your behalf.  Living will  A living will is a set of instructions documenting your wishes about medical care when you cannot express them yourself. Health care providers should keep a copy of your living will in your medical record. You may want to give a copy to family members or friends. To alert caregivers in case of an emergency, you can place a card in your wallet to let them know that you have a living will and where they can find it. A living will is used if you become:  Terminally ill.  Incapacitated.  Unable to communicate or make decisions.  Items to consider in your living will include:  The use or non-use of life-sustaining equipment, such as dialysis machines and breathing machines (ventilators).  A DNR or DNAR order, which is the instruction not to use cardiopulmonary resuscitation (CPR) if breathing or heartbeat stops.  The use or non-use of tube feeding.  Withholding of food and fluids.  Comfort (palliative) care when the goal becomes comfort rather than a cure.  Organ and tissue donation.  A living will does not give  instructions for distributing your money and property if you should pass away. It is recommended that you seek the advice of a  when writing a will. Decisions about taxes, beneficiaries, and asset distribution will be legally binding. This process can relieve your family and friends of any concerns surrounding disputes or questions that may come up about the distribution of your assets.  DNR or DNAR  A DNR or DNAR order is a request not to have CPR in the event that your heart stops beating or you stop breathing. If a DNR or DNAR order has not been made and shared, a health care provider will try to help any patient whose heart has stopped or who has stopped breathing. If you plan to have surgery, talk with your health care provider about how your DNR or DNAR order will be followed if problems occur.  Summary  Advance directives are the legal documents that allow you to make choices ahead of time about your health care and medical treatment in case you become unable to communicate for yourself.  The process of discussing and writing advance directives should happen over time. You can change the advance directives, even after you have signed them.  Advance directives include DNR or DNAR orders, living scruggs, and designating an agent as your medical power of .  This information is not intended to replace advice given to you by your health care provider. Make sure you discuss any questions you have with your health care provider.  Document Released: 03/26/2009 Document Revised: 11/06/2017 Document Reviewed: 11/06/2017  [employer] Interactive Patient Education © 2019 [employer]    Calorie Counting for Weight Loss  Calories are units of energy. Your body needs a certain amount of calories from food to keep you going throughout the day. When you eat more calories than your body needs, your body stores the extra calories as fat. When you eat fewer calories than your body needs, your body burns fat to get the  energy it needs.  Calorie counting means keeping track of how many calories you eat and drink each day. Calorie counting can be helpful if you need to lose weight. If you make sure to eat fewer calories than your body needs, you should lose weight. Ask your health care provider what a healthy weight is for you.  For calorie counting to work, you will need to eat the right number of calories in a day in order to lose a healthy amount of weight per week. A dietitian can help you determine how many calories you need in a day and will give you suggestions on how to reach your calorie goal.  A healthy amount of weight to lose per week is usually 1-2 lb (0.5-0.9 kg). This usually means that your daily calorie intake should be reduced by 500-750 calories.  Eating 1,200 - 1,500 calories per day can help most women lose weight.  Eating 1,500 - 1,800 calories per day can help most men lose weight.  What is my plan?  My goal is to have __________ calories per day.  If I have this many calories per day, I should lose around __________ pounds per week.  What do I need to know about calorie counting?  In order to meet your daily calorie goal, you will need to:  Find out how many calories are in each food you would like to eat. Try to do this before you eat.  Decide how much of the food you plan to eat.  Write down what you ate and how many calories it had. Doing this is called keeping a food log.  To successfully lose weight, it is important to balance calorie counting with a healthy lifestyle that includes regular activity. Aim for 150 minutes of moderate exercise (such as walking) or 75 minutes of vigorous exercise (such as running) each week.  Where do I find calorie information?      The number of calories in a food can be found on a Nutrition Facts label. If a food does not have a Nutrition Facts label, try to look up the calories online or ask your dietitian for help.  Remember that calories are listed per serving. If you  choose to have more than one serving of a food, you will have to multiply the calories per serving by the amount of servings you plan to eat. For example, the label on a package of bread might say that a serving size is 1 slice and that there are 90 calories in a serving. If you eat 1 slice, you will have eaten 90 calories. If you eat 2 slices, you will have eaten 180 calories.  How do I keep a food log?  Immediately after each meal, record the following information in your food log:  What you ate. Don't forget to include toppings, sauces, and other extras on the food.  How much you ate. This can be measured in cups, ounces, or number of items.  How many calories each food and drink had.  The total number of calories in the meal.  Keep your food log near you, such as in a small notebook in your pocket, or use a mobile gena or website. Some programs will calculate calories for you and show you how many calories you have left for the day to meet your goal.  What are some calorie counting tips?      Use your calories on foods and drinks that will fill you up and not leave you hungry:  Some examples of foods that fill you up are nuts and nut butters, vegetables, lean proteins, and high-fiber foods like whole grains. High-fiber foods are foods with more than 5 g fiber per serving.  Drinks such as sodas, specialty coffee drinks, alcohol, and juices have a lot of calories, yet do not fill you up.  Eat nutritious foods and avoid empty calories. Empty calories are calories you get from foods or beverages that do not have many vitamins or protein, such as candy, sweets, and soda. It is better to have a nutritious high-calorie food (such as an avocado) than a food with few nutrients (such as a bag of chips).  Know how many calories are in the foods you eat most often. This will help you calculate calorie counts faster.  Pay attention to calories in drinks. Low-calorie drinks include water and unsweetened drinks.  Pay attention  "to nutrition labels for \"low fat\" or \"fat free\" foods. These foods sometimes have the same amount of calories or more calories than the full fat versions. They also often have added sugar, starch, or salt, to make up for flavor that was removed with the fat.  Find a way of tracking calories that works for you. Get creative. Try different apps or programs if writing down calories does not work for you.  What are some portion control tips?  Know how many calories are in a serving. This will help you know how many servings of a certain food you can have.  Use a measuring cup to measure serving sizes. You could also try weighing out portions on a kitchen scale. With time, you will be able to estimate serving sizes for some foods.  Take some time to put servings of different foods on your favorite plates, bowls, and cups so you know what a serving looks like.  Try not to eat straight from a bag or box. Doing this can lead to overeating. Put the amount you would like to eat in a cup or on a plate to make sure you are eating the right portion.  Use smaller plates, glasses, and bowls to prevent overeating.  Try not to multitask (for example, watch TV or use your computer) while eating. If it is time to eat, sit down at a table and enjoy your food. This will help you to know when you are full. It will also help you to be aware of what you are eating and how much you are eating.  What are tips for following this plan?  Reading food labels  Check the calorie count compared to the serving size. The serving size may be smaller than what you are used to eating.  Check the source of the calories. Make sure the food you are eating is high in vitamins and protein and low in saturated and trans fats.  Shopping  Read nutrition labels while you shop. This will help you make healthy decisions before you decide to purchase your food.  Make a grocery list and stick to it.  Cooking  Try to cook your favorite foods in a healthier way. For " "example, try baking instead of frying.  Use low-fat dairy products.  Meal planning  Use more fruits and vegetables. Half of your plate should be fruits and vegetables.  Include lean proteins like poultry and fish.  How do I count calories when eating out?  Ask for smaller portion sizes.  Consider sharing an entree and sides instead of getting your own entree.  If you get your own entree, eat only half. Ask for a box at the beginning of your meal and put the rest of your entree in it so you are not tempted to eat it.  If calories are listed on the menu, choose the lower calorie options.  Choose dishes that include vegetables, fruits, whole grains, low-fat dairy products, and lean protein.  Choose items that are boiled, broiled, grilled, or steamed. Stay away from items that are buttered, battered, fried, or served with cream sauce. Items labeled \"crispy\" are usually fried, unless stated otherwise.  Choose water, low-fat milk, unsweetened iced tea, or other drinks without added sugar. If you want an alcoholic beverage, choose a lower calorie option such as a glass of wine or light beer.  Ask for dressings, sauces, and syrups on the side. These are usually high in calories, so you should limit the amount you eat.  If you want a salad, choose a garden salad and ask for grilled meats. Avoid extra toppings like richardson, cheese, or fried items. Ask for the dressing on the side, or ask for olive oil and vinegar or lemon to use as dressing.  Estimate how many servings of a food you are given. For example, a serving of cooked rice is ½ cup or about the size of half a baseball. Knowing serving sizes will help you be aware of how much food you are eating at restaurants. The list below tells you how big or small some common portion sizes are based on everyday objects:  1 oz--4 stacked dice.  3 oz--1 deck of cards.  1 tsp--1 die.  1 Tbsp--½ a ping-pong ball.  2 Tbsp--1 ping-pong ball.  ½ cup--½ baseball.  1 cup--1 " baseball.  Summary  Calorie counting means keeping track of how many calories you eat and drink each day. If you eat fewer calories than your body needs, you should lose weight.  A healthy amount of weight to lose per week is usually 1-2 lb (0.5-0.9 kg). This usually means reducing your daily calorie intake by 500-750 calories.  The number of calories in a food can be found on a Nutrition Facts label. If a food does not have a Nutrition Facts label, try to look up the calories online or ask your dietitian for help.  Use your calories on foods and drinks that will fill you up, and not on foods and drinks that will leave you hungry.  Use smaller plates, glasses, and bowls to prevent overeating.  This information is not intended to replace advice given to you by your health care provider. Make sure you discuss any questions you have with your health care provider.  Document Released: 12/18/2006 Document Revised: 09/06/2019 Document Reviewed: 11/17/2017  [employer] Interactive Patient Education © 2019 [employer]

## 2022-06-28 ENCOUNTER — OFFICE VISIT (OUTPATIENT)
Dept: FAMILY MEDICINE CLINIC | Facility: CLINIC | Age: 67
End: 2022-06-28

## 2022-06-28 VITALS
DIASTOLIC BLOOD PRESSURE: 78 MMHG | TEMPERATURE: 96.4 F | SYSTOLIC BLOOD PRESSURE: 125 MMHG | HEART RATE: 85 BPM | HEIGHT: 63 IN | WEIGHT: 233 LBS | OXYGEN SATURATION: 97 % | BODY MASS INDEX: 41.29 KG/M2

## 2022-06-28 DIAGNOSIS — R19.7 DIARRHEA OF PRESUMED INFECTIOUS ORIGIN: ICD-10-CM

## 2022-06-28 DIAGNOSIS — N39.0 URINARY TRACT INFECTION WITHOUT HEMATURIA, SITE UNSPECIFIED: ICD-10-CM

## 2022-06-28 DIAGNOSIS — F32.A DEPRESSION, UNSPECIFIED DEPRESSION TYPE: ICD-10-CM

## 2022-06-28 DIAGNOSIS — E53.8 B12 DEFICIENCY: ICD-10-CM

## 2022-06-28 DIAGNOSIS — E66.01 CLASS 3 SEVERE OBESITY DUE TO EXCESS CALORIES WITH SERIOUS COMORBIDITY AND BODY MASS INDEX (BMI) OF 40.0 TO 44.9 IN ADULT: ICD-10-CM

## 2022-06-28 DIAGNOSIS — E78.5 HYPERLIPIDEMIA, UNSPECIFIED HYPERLIPIDEMIA TYPE: ICD-10-CM

## 2022-06-28 DIAGNOSIS — R73.9 HYPERGLYCEMIA: ICD-10-CM

## 2022-06-28 DIAGNOSIS — E04.2 MULTIPLE THYROID NODULES: ICD-10-CM

## 2022-06-28 DIAGNOSIS — I10 PRIMARY HYPERTENSION: ICD-10-CM

## 2022-06-28 DIAGNOSIS — Z12.11 SCREENING FOR COLON CANCER: ICD-10-CM

## 2022-06-28 DIAGNOSIS — J47.9: ICD-10-CM

## 2022-06-28 DIAGNOSIS — N39.0 FREQUENT UTI: ICD-10-CM

## 2022-06-28 DIAGNOSIS — Z00.01 ENCOUNTER FOR ANNUAL GENERAL MEDICAL EXAMINATION WITH ABNORMAL FINDINGS IN ADULT: Primary | ICD-10-CM

## 2022-06-28 DIAGNOSIS — M81.0 AGE RELATED OSTEOPOROSIS, UNSPECIFIED PATHOLOGICAL FRACTURE PRESENCE: ICD-10-CM

## 2022-06-28 DIAGNOSIS — Z12.4 SCREENING FOR CERVICAL CANCER: ICD-10-CM

## 2022-06-28 DIAGNOSIS — Z78.0 POST-MENOPAUSAL: ICD-10-CM

## 2022-06-28 PROCEDURE — 87086 URINE CULTURE/COLONY COUNT: CPT | Performed by: PREVENTIVE MEDICINE

## 2022-06-28 PROCEDURE — 1159F MED LIST DOCD IN RCRD: CPT | Performed by: PREVENTIVE MEDICINE

## 2022-06-28 PROCEDURE — 96160 PT-FOCUSED HLTH RISK ASSMT: CPT | Performed by: PREVENTIVE MEDICINE

## 2022-06-28 PROCEDURE — 99397 PER PM REEVAL EST PAT 65+ YR: CPT | Performed by: PREVENTIVE MEDICINE

## 2022-06-28 PROCEDURE — 1170F FXNL STATUS ASSESSED: CPT | Performed by: PREVENTIVE MEDICINE

## 2022-06-28 PROCEDURE — G0439 PPPS, SUBSEQ VISIT: HCPCS | Performed by: PREVENTIVE MEDICINE

## 2022-06-28 PROCEDURE — 87186 SC STD MICRODIL/AGAR DIL: CPT | Performed by: PREVENTIVE MEDICINE

## 2022-06-28 PROCEDURE — 99213 OFFICE O/P EST LOW 20 MIN: CPT | Performed by: PREVENTIVE MEDICINE

## 2022-06-30 ENCOUNTER — TELEPHONE (OUTPATIENT)
Dept: FAMILY MEDICINE CLINIC | Facility: CLINIC | Age: 67
End: 2022-06-30

## 2022-06-30 LAB — BACTERIA SPEC AEROBE CULT: ABNORMAL

## 2022-06-30 RX ORDER — NITROFURANTOIN 25; 75 MG/1; MG/1
100 CAPSULE ORAL 2 TIMES DAILY
Qty: 14 CAPSULE | Refills: 0 | Status: SHIPPED | OUTPATIENT
Start: 2022-06-30 | End: 2022-08-04

## 2022-06-30 NOTE — TELEPHONE ENCOUNTER
HUB TO READ:  ----- Message from Ban Alexander MD sent at 6/30/2022  1:38 PM EDT -----  Does look as though her urine is infected we will restart the Macrobid take 1 tablet twice daily for the next 7 days and reculture urine 3 days after off antibiotics referral is in for urology.

## 2022-06-30 NOTE — PROGRESS NOTES
Does look as though her urine is infected we will restart the Macrobid take 1 tablet twice daily for the next 7 days and reculture urine 3 days after off antibiotics referral is in for urology.

## 2022-07-08 ENCOUNTER — OFFICE VISIT (OUTPATIENT)
Dept: ORTHOPEDIC SURGERY | Facility: CLINIC | Age: 67
End: 2022-07-08

## 2022-07-08 VITALS — HEART RATE: 99 BPM | HEIGHT: 63 IN | WEIGHT: 233 LBS | OXYGEN SATURATION: 100 % | BODY MASS INDEX: 41.29 KG/M2

## 2022-07-08 DIAGNOSIS — M17.12 PRIMARY OSTEOARTHRITIS OF LEFT KNEE: Primary | ICD-10-CM

## 2022-07-08 PROCEDURE — 20610 DRAIN/INJ JOINT/BURSA W/O US: CPT | Performed by: FAMILY MEDICINE

## 2022-07-08 NOTE — PROGRESS NOTES
"Primary Care Sports Medicine Office Visit Note     Patient ID: Sherrie Chand is a 67 y.o. female.    Chief Complaint:  Chief Complaint   Patient presents with   • Left Knee - Pain, Follow-up     HPI:    Ms. Sherrie Chand is a 67 y.o. female. The patient presents to the clinic today for a repeat Monovisc injection to the left knee.    She states that she continues to experience significant pain and edema in her left knee. She notes that she \"hops\" to get around. She reports that she has started to hear her \"grinding\" in her knee.     Past Medical History:   Diagnosis Date   • Arthritis    • Depression    • Low back pain    • Osteoporosis    • Spinal stenosis 2017       Past Surgical History:   Procedure Laterality Date   • ANTERIOR CERVICAL DISCECTOMY      C3-C4/Ant. plating C3-C6 for non-union-abstracted from [facility]   • APPENDECTOMY     • BACK SURGERY  2017    Interior and posterior-Abstracted from [facility]   • CERVICAL CORPECTOMY      C5   • MEDIASTINOTOMY FOR EXPLORATION / DRAINAGE / BIOPSY / REMOVAL FB W/ CERVICAL APPROACH  2011    Removal of cervical plate-Abstracted from [facility]   • VAGINAL HYSTERECTOMY         Family History   Problem Relation Age of Onset   • Arthritis Mother    • Arthritis Father    • Lung cancer Father    • Other Father         Other cancer   • Diabetes Father    • Arthritis Maternal Grandmother    • Lung cancer Other      Social History     Occupational History   • Not on file   Tobacco Use   • Smoking status: Former Smoker     Packs/day: 0.50     Years: 30.00     Pack years: 15.00     Types: Cigarettes     Quit date:      Years since quittin.5   • Smokeless tobacco: Never Used   Vaping Use   • Vaping Use: Former   Substance and Sexual Activity   • Alcohol use: No   • Drug use: No   • Sexual activity: Yes     Partners: Male     Birth control/protection: None      Review of Systems   Constitutional: Negative for activity change and fever.   Musculoskeletal: Positive for " "arthralgias.   Skin: Negative for color change and rash.   Neurological: Negative for weakness.     Objective:    Pulse 99   Ht 160 cm (63\")   Wt 106 kg (233 lb)   LMP  (LMP Unknown)   SpO2 100%   BMI 41.27 kg/m²     Physical Examination:  Physical Exam  Vitals and nursing note reviewed.   Constitutional:       General: She is not in acute distress.     Appearance: She is well-developed. She is not diaphoretic.   HENT:      Head: Normocephalic and atraumatic.   Eyes:      Conjunctiva/sclera: Conjunctivae normal.   Pulmonary:      Effort: Pulmonary effort is normal. No respiratory distress.   Musculoskeletal:      Left knee: No effusion.      Instability Tests: Medial Jacob test negative and lateral Jacob test negative.   Skin:     General: Skin is warm.      Capillary Refill: Capillary refill takes less than 2 seconds.   Neurological:      Mental Status: She is alert.       Left Ankle Exam     Range of Motion   The patient has normal left ankle ROM.       Left Knee Exam     Muscle Strength   The patient has normal left knee strength.    Tenderness   The patient is experiencing tenderness in the lateral joint line, medial joint line and patella.    Range of Motion   Extension: normal   Flexion: normal     Tests   Jacob:  Medial - negative Lateral - negative  Varus: negative Valgus: negative  Left knee patellar apprehension test: +patellar grind testing, +kaiden eric testing.    Other   Erythema: absent  Sensation: normal  Pulse: present (distal to the knee, DP and PT palpable)  Swelling: none  Effusion: no effusion present        Imaging and other tests:      Assessment and Plan:  1. Primary osteoarthritis of the left knee    After discussion of risks and benefits, the patient elected to proceed with hyaluronic acid injection to the left knee.  The patient tolerated this procedure well without any complaints or problems.  I recommended continuation of conservative management as previous, RTC in 3-6 " months or sooner if symptoms recur.    Transcribed from ambient dictation for Sarmad Long II, DO by Yesy Garcia.  07/08/22   13:43 EDT    Patient verbalized consent to the visit recording.    Disclaimer: Please note that areas of this note were completed with computer voice recognition software.  Quite often unanticipated grammatical, syntax, homophones, and other interpretive errors are inadvertently transcribed by the computer software. Please excuse any errors that have escaped final proofreading.

## 2022-07-11 NOTE — PROGRESS NOTES
Procedure   Large Joint Arthrocentesis: L knee  Date/Time: 7/8/2022 1:14 PM  Consent given by: patient  Site marked: site marked  Timeout: Immediately prior to procedure a time out was called to verify the correct patient, procedure, equipment, support staff and site/side marked as required   Supporting Documentation  Indications: pain   Procedure Details  Location: knee - L knee  Preparation: Patient was prepped and draped in the usual sterile fashion  Needle size: 18 G  Approach: anteromedial (25ga needle was used to inject 3cc of 2% lidocaine without epinepherine to anesthetize the tract)  Medications administered: 88 mg Hyaluronan 88 MG/4ML  Aspirate amount: 12 mL  Aspirate: clear, serous and yellow  Patient tolerance: patient tolerated the procedure well with no immediate complications

## 2022-07-20 ENCOUNTER — CLINICAL SUPPORT (OUTPATIENT)
Dept: FAMILY MEDICINE CLINIC | Facility: CLINIC | Age: 67
End: 2022-07-20

## 2022-07-20 DIAGNOSIS — F32.A DEPRESSION, UNSPECIFIED DEPRESSION TYPE: ICD-10-CM

## 2022-07-20 DIAGNOSIS — I10 PRIMARY HYPERTENSION: ICD-10-CM

## 2022-07-20 DIAGNOSIS — E04.2 MULTIPLE THYROID NODULES: ICD-10-CM

## 2022-07-20 DIAGNOSIS — E53.8 B12 DEFICIENCY: ICD-10-CM

## 2022-07-20 DIAGNOSIS — E78.5 HYPERLIPIDEMIA, UNSPECIFIED HYPERLIPIDEMIA TYPE: ICD-10-CM

## 2022-07-20 DIAGNOSIS — N39.0 FREQUENT UTI: Primary | ICD-10-CM

## 2022-07-20 DIAGNOSIS — R73.9 HYPERGLYCEMIA: ICD-10-CM

## 2022-07-20 LAB — HBA1C MFR BLD: 5.6 % (ref 3.5–5.6)

## 2022-07-20 PROCEDURE — 85025 COMPLETE CBC W/AUTO DIFF WBC: CPT | Performed by: PREVENTIVE MEDICINE

## 2022-07-20 PROCEDURE — 87186 SC STD MICRODIL/AGAR DIL: CPT | Performed by: PREVENTIVE MEDICINE

## 2022-07-20 PROCEDURE — 83036 HEMOGLOBIN GLYCOSYLATED A1C: CPT | Performed by: PREVENTIVE MEDICINE

## 2022-07-20 PROCEDURE — 84443 ASSAY THYROID STIM HORMONE: CPT | Performed by: PREVENTIVE MEDICINE

## 2022-07-20 PROCEDURE — 87086 URINE CULTURE/COLONY COUNT: CPT | Performed by: PREVENTIVE MEDICINE

## 2022-07-20 PROCEDURE — 87088 URINE BACTERIA CULTURE: CPT | Performed by: PREVENTIVE MEDICINE

## 2022-07-20 PROCEDURE — 82607 VITAMIN B-12: CPT | Performed by: PREVENTIVE MEDICINE

## 2022-07-20 PROCEDURE — 80053 COMPREHEN METABOLIC PANEL: CPT | Performed by: PREVENTIVE MEDICINE

## 2022-07-20 PROCEDURE — 83735 ASSAY OF MAGNESIUM: CPT | Performed by: PREVENTIVE MEDICINE

## 2022-07-20 PROCEDURE — 36415 COLL VENOUS BLD VENIPUNCTURE: CPT | Performed by: PREVENTIVE MEDICINE

## 2022-07-20 PROCEDURE — 80061 LIPID PANEL: CPT | Performed by: PREVENTIVE MEDICINE

## 2022-07-20 NOTE — PROGRESS NOTES
Venipuncture Blood Specimen Collection  Venipuncture performed in lleft arm by Karol Maxwell MA with good hemostasis. Patient tolerated the procedure well without complications.   07/20/22   Karol Maxwell MA

## 2022-07-21 LAB
ALBUMIN SERPL-MCNC: 4.3 G/DL (ref 3.5–5.2)
ALBUMIN/GLOB SERPL: 1.9 G/DL
ALP SERPL-CCNC: 81 U/L (ref 39–117)
ALT SERPL W P-5'-P-CCNC: 22 U/L (ref 1–33)
ANION GAP SERPL CALCULATED.3IONS-SCNC: 13 MMOL/L (ref 5–15)
AST SERPL-CCNC: 17 U/L (ref 1–32)
BASOPHILS # BLD AUTO: 0.01 10*3/MM3 (ref 0–0.2)
BASOPHILS NFR BLD AUTO: 0.1 % (ref 0–1.5)
BILIRUB SERPL-MCNC: 0.3 MG/DL (ref 0–1.2)
BUN SERPL-MCNC: 15 MG/DL (ref 8–23)
BUN/CREAT SERPL: 18.1 (ref 7–25)
CALCIUM SPEC-SCNC: 9.9 MG/DL (ref 8.6–10.5)
CHLORIDE SERPL-SCNC: 102 MMOL/L (ref 98–107)
CHOLEST SERPL-MCNC: 113 MG/DL (ref 0–200)
CO2 SERPL-SCNC: 26 MMOL/L (ref 22–29)
CREAT SERPL-MCNC: 0.83 MG/DL (ref 0.57–1)
DEPRECATED RDW RBC AUTO: 43.8 FL (ref 37–54)
EGFRCR SERPLBLD CKD-EPI 2021: 78.3 ML/MIN/1.73
EOSINOPHIL # BLD AUTO: 0.05 10*3/MM3 (ref 0–0.4)
EOSINOPHIL NFR BLD AUTO: 0.7 % (ref 0.3–6.2)
ERYTHROCYTE [DISTWIDTH] IN BLOOD BY AUTOMATED COUNT: 13.1 % (ref 12.3–15.4)
GLOBULIN UR ELPH-MCNC: 2.3 GM/DL
GLUCOSE SERPL-MCNC: 104 MG/DL (ref 65–99)
HCT VFR BLD AUTO: 38.8 % (ref 34–46.6)
HDLC SERPL-MCNC: 35 MG/DL (ref 40–60)
HGB BLD-MCNC: 13 G/DL (ref 12–15.9)
IMM GRANULOCYTES # BLD AUTO: 0.07 10*3/MM3 (ref 0–0.05)
IMM GRANULOCYTES NFR BLD AUTO: 0.9 % (ref 0–0.5)
LDLC SERPL CALC-MCNC: 50 MG/DL (ref 0–100)
LDLC/HDLC SERPL: 1.27 {RATIO}
LYMPHOCYTES # BLD AUTO: 2.18 10*3/MM3 (ref 0.7–3.1)
LYMPHOCYTES NFR BLD AUTO: 28.6 % (ref 19.6–45.3)
MAGNESIUM SERPL-MCNC: 1.8 MG/DL (ref 1.6–2.4)
MCH RBC QN AUTO: 31.1 PG (ref 26.6–33)
MCHC RBC AUTO-ENTMCNC: 33.5 G/DL (ref 31.5–35.7)
MCV RBC AUTO: 92.8 FL (ref 79–97)
MONOCYTES # BLD AUTO: 0.64 10*3/MM3 (ref 0.1–0.9)
MONOCYTES NFR BLD AUTO: 8.4 % (ref 5–12)
NEUTROPHILS NFR BLD AUTO: 4.67 10*3/MM3 (ref 1.7–7)
NEUTROPHILS NFR BLD AUTO: 61.3 % (ref 42.7–76)
NRBC BLD AUTO-RTO: 0.1 /100 WBC (ref 0–0.2)
PLATELET # BLD AUTO: 271 10*3/MM3 (ref 140–450)
PMV BLD AUTO: 10.9 FL (ref 6–12)
POTASSIUM SERPL-SCNC: 4.5 MMOL/L (ref 3.5–5.2)
PROT SERPL-MCNC: 6.6 G/DL (ref 6–8.5)
RBC # BLD AUTO: 4.18 10*6/MM3 (ref 3.77–5.28)
SODIUM SERPL-SCNC: 141 MMOL/L (ref 136–145)
TRIGL SERPL-MCNC: 168 MG/DL (ref 0–150)
TSH SERPL DL<=0.05 MIU/L-ACNC: 2.29 UIU/ML (ref 0.27–4.2)
VIT B12 BLD-MCNC: 559 PG/ML (ref 211–946)
VLDLC SERPL-MCNC: 28 MG/DL (ref 5–40)
WBC NRBC COR # BLD: 7.62 10*3/MM3 (ref 3.4–10.8)

## 2022-07-22 ENCOUNTER — TELEPHONE (OUTPATIENT)
Dept: FAMILY MEDICINE CLINIC | Facility: CLINIC | Age: 67
End: 2022-07-22

## 2022-07-22 DIAGNOSIS — N39.0 URINARY TRACT INFECTION WITHOUT HEMATURIA, SITE UNSPECIFIED: Primary | ICD-10-CM

## 2022-07-22 DIAGNOSIS — N39.0 RECURRENT UTI: ICD-10-CM

## 2022-07-22 LAB — BACTERIA SPEC AEROBE CULT: ABNORMAL

## 2022-07-22 RX ORDER — CIPROFLOXACIN 500 MG/1
500 TABLET, FILM COATED ORAL 2 TIMES DAILY
Qty: 20 TABLET | Refills: 0 | Status: SHIPPED | OUTPATIENT
Start: 2022-07-22 | End: 2022-12-15

## 2022-07-22 NOTE — PROGRESS NOTES
Still UtI-did you take Macrobid till gone? If yes then we should tryCipro this time and care not to stretch Tendons and again reculture 3 days off antibiotics-let me know.  Blood sugar elevated but A1C shows no increased risk for DM

## 2022-07-22 NOTE — TELEPHONE ENCOUNTER
HUB TO READ     ----- Message from Ban Alexander MD sent at 7/22/2022 12:29 PM EDT -----  Still UtI-did you take Macrobid till gone? If yes then we should tryCipro this time and care not to stretch Tendons and again reculture 3 days off antibiotics-let me know.  Blood sugar elevated but A1C shows no increased risk for DM

## 2022-08-02 NOTE — PATIENT INSTRUCTIONS
Health Maintenance Due   Topic Date Due    COLORECTAL CANCER SCREENING  Never done    TDAP/TD VACCINES (1 - Tdap) Never done    ZOSTER VACCINE (1 of 2) Never done    DXA SCAN  10/31/2021    PAP SMEAR  03/12/2022    MAMMOGRAM  07/13/2022

## 2022-08-04 ENCOUNTER — OFFICE VISIT (OUTPATIENT)
Dept: FAMILY MEDICINE CLINIC | Facility: CLINIC | Age: 67
End: 2022-08-04

## 2022-08-04 VITALS
OXYGEN SATURATION: 97 % | SYSTOLIC BLOOD PRESSURE: 109 MMHG | HEIGHT: 63 IN | WEIGHT: 232 LBS | TEMPERATURE: 95.2 F | DIASTOLIC BLOOD PRESSURE: 72 MMHG | BODY MASS INDEX: 41.11 KG/M2 | HEART RATE: 98 BPM

## 2022-08-04 DIAGNOSIS — Z78.0 POST-MENOPAUSAL: ICD-10-CM

## 2022-08-04 DIAGNOSIS — Z12.4 SCREENING FOR CERVICAL CANCER: Primary | ICD-10-CM

## 2022-08-04 DIAGNOSIS — E04.2 MULTIPLE THYROID NODULES: ICD-10-CM

## 2022-08-04 DIAGNOSIS — E66.01 CLASS 3 SEVERE OBESITY DUE TO EXCESS CALORIES WITH SERIOUS COMORBIDITY AND BODY MASS INDEX (BMI) OF 40.0 TO 44.9 IN ADULT: ICD-10-CM

## 2022-08-04 DIAGNOSIS — Z12.11 SCREENING FOR COLON CANCER: ICD-10-CM

## 2022-08-04 DIAGNOSIS — Z12.31 ENCOUNTER FOR SCREENING MAMMOGRAM FOR MALIGNANT NEOPLASM OF BREAST: ICD-10-CM

## 2022-08-04 PROCEDURE — 99214 OFFICE O/P EST MOD 30 MIN: CPT | Performed by: PREVENTIVE MEDICINE

## 2022-08-04 NOTE — PROGRESS NOTES
"Subjective   Sherrie Chand is a 67 y.o. female presents for   Chief Complaint   Patient presents with   • Gynecologic Exam     Annual pap exam      Patient presents today for her annual GYN exam.  She has never had a positive Pap smear and there is no family history of breast cancer does have history of thyroid nodules.  She was encouraged to get her colon cancer screening mammogram and DEXA scan done.  Health Maintenance Due   Topic Date Due   • COLORECTAL CANCER SCREENING  Never done   • TDAP/TD VACCINES (1 - Tdap) Never done   • ZOSTER VACCINE (1 of 2) Never done   • DXA SCAN  10/31/2021   • PAP SMEAR  03/12/2022   • MAMMOGRAM  07/13/2022       History of Present Illness     Vitals:    08/04/22 1042 08/04/22 1045 08/04/22 1047   BP: 122/76 144/69 109/72   BP Location: Left arm Right arm Right arm   Patient Position: Sitting Standing Sitting   Cuff Size: Adult Adult Adult   Pulse: 98     Temp: 95.2 °F (35.1 °C)     TempSrc: Temporal     SpO2: 97%     Weight: 105 kg (232 lb)     Height: 160 cm (63\")       Body mass index is 41.1 kg/m².    Current Outpatient Medications on File Prior to Visit   Medication Sig Dispense Refill   • atorvastatin (LIPITOR) 40 MG tablet TAKE 1 TABLET EVERY NIGHT 90 tablet 3   • ciprofloxacin (CIPRO) 500 MG tablet Take 1 tablet by mouth 2 (Two) Times a Day. 20 tablet 0   • cyclobenzaprine (FLEXERIL) 10 MG tablet      • DULoxetine (CYMBALTA) 60 MG capsule TAKE 1 CAPSULE EVERY DAY 90 capsule 1   • gabapentin (NEURONTIN) 800 MG tablet TAKE 1 TABLET BY MOUTH 3 TIMES A DAY 90 tablet 11   • HYDROcodone-acetaminophen (NORCO) 7.5-325 MG per tablet Take 1 tablet by mouth 3 (Three) Times a Day As Needed for Severe Pain . 90 tablet 0   • HYDROcodone-acetaminophen (NORCO) 7.5-325 MG per tablet Take 1 tablet by mouth 3 (Three) Times a Day As Needed for Severe Pain . DNF before 8/1/2022 90 tablet 0   • lisinopril-hydrochlorothiazide (PRINZIDE,ZESTORETIC) 20-25 MG per tablet TAKE 1 TABLET EVERY DAY 90 " tablet 1   • Misc. Devices (Bariatric Rollator) misc 1 each Daily. Indications: back pain and needs seat to rest forwalking. 1 each 0   • St Johns Wort 150 MG capsule Take 1 capsule by mouth Daily.     • sulindac (CLINORIL) 200 MG tablet Take 1 tablet by mouth 2 (Two) Times a Day. 180 tablet 0   • vitamin B-6 (PYRIDOXINE) 100 MG tablet Take 300 mg by mouth Daily.     • [DISCONTINUED] nitrofurantoin, macrocrystal-monohydrate, (Macrobid) 100 MG capsule Take 1 capsule by mouth 2 (Two) Times a Day. 14 capsule 0     No current facility-administered medications on file prior to visit.       The following portions of the patient's history were reviewed and updated as appropriate: allergies, current medications, past family history, past medical history, past social history, past surgical history and problem list.    Review of Systems   Gastrointestinal: Negative for abdominal pain.   Genitourinary: Negative for breast lump, breast pain, vaginal bleeding, vaginal discharge and vaginal pain.       Objective   Physical Exam  Vitals reviewed.   Constitutional:       General: She is not in acute distress.     Appearance: She is well-developed. She is obese. She is not ill-appearing or toxic-appearing.   HENT:      Head: Normocephalic and atraumatic.      Nose: Nose normal.   Eyes:      Extraocular Movements: Extraocular movements intact.      Conjunctiva/sclera: Conjunctivae normal.      Pupils: Pupils are equal, round, and reactive to light.   Neck:      Vascular: No carotid bruit.   Cardiovascular:      Rate and Rhythm: Normal rate and regular rhythm.      Heart sounds: Normal heart sounds.   Pulmonary:      Effort: Pulmonary effort is normal.      Breath sounds: Normal breath sounds.   Abdominal:      General: Bowel sounds are normal. There is no distension.      Palpations: Abdomen is soft. There is no mass.      Tenderness: There is no abdominal tenderness.   Genitourinary:     General: Normal vulva.      Vagina: No  vaginal discharge.      Rectum: Normal.      Comments: Breast exam negative rectal stool vaginally no masses in the pouch of Richard.  Musculoskeletal:         General: Normal range of motion.      Cervical back: Neck supple. No tenderness.   Skin:     General: Skin is warm.   Neurological:      Mental Status: She is alert and oriented to person, place, and time.   Psychiatric:         Mood and Affect: Mood normal.         Behavior: Behavior normal.       PHQ-9 Total Score:      Assessment & Plan   Diagnoses and all orders for this visit:    1. Screening for cervical cancer (Primary)  Comments:  Never had an abnormal Pap smear no vaginal discharge or abdominal pain.  Orders:  -     IGP,Aptima HPV,Age Gdln; Future  -     IGP,Aptima HPV,Age Gdln    2. Screening for colon cancer  Comments:  Again advised to comply with colorectal cancer screening    3. Post-menopausal  Comments:  DEXA ordered  Orders:  -     DEXA Bone Density Axial; Future    4. Encounter for screening mammogram for malignant neoplasm of breast  Comments:  Mammogram ordered physician breast exam was negative for masses or swelling.  Orders:  -     Mammo Screening Digital Tomosynthesis Bilateral With CAD; Future    5. Multiple thyroid nodules  Comments:  Thyroid palpated no specific nodules palpated today.    6. Class 3 severe obesity due to excess calories with serious comorbidity and body mass index (BMI) of 40.0 to 44.9 in adult (HCC)        Patient Instructions     Health Maintenance Due   Topic Date Due   • COLORECTAL CANCER SCREENING  Never done   • TDAP/TD VACCINES (1 - Tdap) Never done   • ZOSTER VACCINE (1 of 2) Never done   • DXA SCAN  10/31/2021   • PAP SMEAR  03/12/2022   • MAMMOGRAM  07/13/2022              - - -

## 2022-08-09 LAB
AGE GDLN ACOG TESTING: NORMAL
CYTOLOGIST CVX/VAG CYTO: NORMAL
CYTOLOGY CVX/VAG DOC CYTO: NORMAL
CYTOLOGY CVX/VAG DOC THIN PREP: NORMAL
DX ICD CODE: NORMAL
HIV 1 & 2 AB SER-IMP: NORMAL
HPV I/H RISK 4 DNA CVX QL PROBE+SIG AMP: NEGATIVE
OTHER STN SPEC: NORMAL
STAT OF ADQ CVX/VAG CYTO-IMP: NORMAL

## 2022-08-10 ENCOUNTER — TELEPHONE (OUTPATIENT)
Dept: FAMILY MEDICINE CLINIC | Facility: CLINIC | Age: 67
End: 2022-08-10

## 2022-08-10 ENCOUNTER — TELEPHONE (OUTPATIENT)
Dept: ORTHOPEDIC SURGERY | Facility: CLINIC | Age: 67
End: 2022-08-10

## 2022-08-10 DIAGNOSIS — M17.12 PRIMARY OSTEOARTHRITIS OF LEFT KNEE: Primary | ICD-10-CM

## 2022-08-10 DIAGNOSIS — M25.562 ACUTE PAIN OF LEFT KNEE: ICD-10-CM

## 2022-08-10 NOTE — TELEPHONE ENCOUNTER
----- Message from Sherrie Chand sent at 8/10/2022 12:40 PM EDT -----  Regarding: Knee pain  At this time I have not heard from anyone to schedule MRI. Is there someone I can contact to get this scheduled?

## 2022-08-10 NOTE — TELEPHONE ENCOUNTER
HUB TO READ:  ----- Message from Ban Alexander MD sent at 8/10/2022  6:02 AM EDT -----  PAP was negative

## 2022-08-11 ENCOUNTER — TELEPHONE (OUTPATIENT)
Dept: ORTHOPEDIC SURGERY | Facility: CLINIC | Age: 67
End: 2022-08-11

## 2022-08-11 RX ORDER — SULINDAC 200 MG/1
200 TABLET ORAL 2 TIMES DAILY
Qty: 180 TABLET | Refills: 0 | Status: SHIPPED | OUTPATIENT
Start: 2022-08-11 | End: 2022-08-19

## 2022-08-11 NOTE — TELEPHONE ENCOUNTER
----- Message from Sarmad Long II, DO sent at 8/8/2022  8:26 AM EDT -----  Can you please order MRI L knee for this patient? Re: knee instability.     Thanks,   CD

## 2022-08-19 RX ORDER — SULINDAC 200 MG/1
TABLET ORAL
Qty: 180 TABLET | Refills: 0 | Status: SHIPPED | OUTPATIENT
Start: 2022-08-19 | End: 2022-12-22 | Stop reason: SDUPTHER

## 2022-08-31 ENCOUNTER — OFFICE VISIT (OUTPATIENT)
Dept: PAIN MEDICINE | Facility: CLINIC | Age: 67
End: 2022-08-31

## 2022-08-31 VITALS
SYSTOLIC BLOOD PRESSURE: 105 MMHG | DIASTOLIC BLOOD PRESSURE: 70 MMHG | HEART RATE: 87 BPM | OXYGEN SATURATION: 96 % | RESPIRATION RATE: 16 BRPM

## 2022-08-31 DIAGNOSIS — Z79.899 HIGH RISK MEDICATION USE: Primary | ICD-10-CM

## 2022-08-31 DIAGNOSIS — M25.50 POLYARTHRALGIA: ICD-10-CM

## 2022-08-31 DIAGNOSIS — M25.562 ACUTE PAIN OF LEFT KNEE: ICD-10-CM

## 2022-08-31 DIAGNOSIS — G89.4 CHRONIC PAIN SYNDROME: ICD-10-CM

## 2022-08-31 DIAGNOSIS — M96.1 POSTLAMINECTOMY SYNDROME OF LUMBAR REGION: ICD-10-CM

## 2022-08-31 PROCEDURE — 99214 OFFICE O/P EST MOD 30 MIN: CPT | Performed by: ANESTHESIOLOGY

## 2022-08-31 RX ORDER — HYDROCODONE BITARTRATE AND ACETAMINOPHEN 7.5; 325 MG/1; MG/1
1 TABLET ORAL 3 TIMES DAILY PRN
Qty: 90 TABLET | Refills: 0 | Status: SHIPPED | OUTPATIENT
Start: 2022-08-31 | End: 2022-10-19 | Stop reason: SDUPTHER

## 2022-08-31 RX ORDER — HYDROCODONE BITARTRATE AND ACETAMINOPHEN 7.5; 325 MG/1; MG/1
1 TABLET ORAL 3 TIMES DAILY PRN
Qty: 90 TABLET | Refills: 0 | Status: SHIPPED | OUTPATIENT
Start: 2022-09-29 | End: 2022-10-19 | Stop reason: SDUPTHER

## 2022-08-31 NOTE — PROGRESS NOTES
CC  hip/buttock, right leg, lower back pain  67-year-old female with chronic back pain/postlaminectomy syndrome history of fusion L2 to to S1, here for follow-up.  Continued worsening left knee pain, seen Ortho/Ethan, marginal relief with steroid or Gel injection.  Now ambulating with cane.  MRI pending this week.  Chronic back pain radiating to bilateral lower extremity worse on the left with aching numbness in both legs worse with standing walking or activity.  Denies new weakness saddle anesthesia bladder bowel incontinence.    Pain interfering with daily activity and sleep.  Marginal relief with physical therapy or anti-inflammatories.  Marginal relief with caudal JESSICA in the past.  Declines LESI due to past experience with post dural puncture headache.  Mild relief with transforaminal JESSICA.      Utilizes hydrocodone with good relief functional benefit and side effects.      CT abdomen pelvis : L2-S1 posterior spinal fusion hardware appears intact. L4 lobectomy with interbody strut is in place. Advanced degenerative disc endplate changes  are present at the L1-2 level. No acute osseous abnormalities are identified.  L-spine MRI 2016: Multilevel degenerative disc disease and degenerative facet change as well as  multilevel postoperative changes detailed above.  There is a 14 mm of anterolisthesis of L4 on L5 which has worsened from the prior exam where this was only 4 mm.  This results in severe bilateral neural foraminal narrowing but no spinal canal stenosis.    Pain Assessment   Cause of Pain: surgery  Location of Pain: Lower Back, R Hip, L Hip, R Leg, L Leg  Description of Pain: Dull/Aching, Throbbing, Stabbing  Previous Pain Rating : 7  Current Pain Ratin  Aggravating Factors: Activity  Alleviating Factors: Rest, Medication  Previous Treatments: Epidural Steroids, Narcotic Pain Medication, Physical Therapy  Previous Diagnostic Studies: MRI   PEG Assessment   What number best describes your pain on  average in the past week?7  What number best describes how, during the past week, pain has interfered with your enjoyment of life?3  What number best describes how, during the past week, pain has interfered with your general activity? 10     has a past medical history of Arthritis, Depression, Low back pain, Osteoporosis, and Spinal stenosis (2017).     has a past surgical history that includes Cervical Curettage; Anterior cervical discectomy; Total vaginal hysterectomy; Appendectomy; Back surgery (); and Mediastinotomy for exploration / drainage / biopsy / removal Fb w/ cervical approach (2011).     Social History     Tobacco Use   • Smoking status: Former Smoker     Packs/day: 0.50     Years: 30.00     Pack years: 15.00     Types: Cigarettes     Quit date:      Years since quittin.6   • Smokeless tobacco: Never Used   Substance Use Topics   • Alcohol use: No     Review of Systems        See HPI      Vitals:    22 0821   BP: 105/70   Pulse: 87   Resp: 16   SpO2: 96%     Physical Exam  Vitals reviewed.   Constitutional:       General: She is not in acute distress.  Pulmonary:      Effort: Pulmonary effort is normal.   Musculoskeletal:      Lumbar back: Tenderness present. Positive right straight leg raise test.      Comments: Lumbar loading positive, pain on extension of low back past 5 degrees.  TTP on the lumbar facets noted.       PHQ 9 on chart                     opioid risk tool low risk      Assessment /Plan     Diagnoses and all orders for this visit:    1. Chronic pain syndrome (Primary)  -     HYDROcodone-acetaminophen (NORCO) 7.5-325 MG per tablet; Take 1 tablet by mouth 3 (Three) Times a Day As Needed for Severe Pain. DNF before 2022  Dispense: 90 tablet; Refill: 0  -     HYDROcodone-acetaminophen (NORCO) 7.5-325 MG per tablet; Take 1 tablet by mouth 3 (Three) Times a Day As Needed for Severe Pain.  Dispense: 90 tablet; Refill: 0    2. Postlaminectomy syndrome of lumbar  region    3. Acute pain of left knee    4. Polyarthralgia    5. High risk medication use      Summary   67-year-old female with chronic back pain here for follow-up.    chronic pain from lumbar DDD spondylosis with radicular pain.  Chronic polyarthralgia.  Repeat right L4-L5 transforaminal JESSICA or SI injection as needed.    Continued worsening left knee pain, seen Ortho/Ethan, marginal relief with steroid or Gel injection.  Now ambulating with cane.  MRI pending this week.  She declines a dose increase for hydrocodone at this time.    Continue hydrocodone 7.5/25 3 times daily.  UDS and inspect reviewed.  Discussed risk of tolerance, dependence, respiratory depression, coma and death associated with use of oral opioids for treatment of chronic nonmalignant pain.      Continue gabapentin/flexeril.  Cont. topical compounded neuropathic/anti-inflammatory cream with ketamine.      RTC 2 mo

## 2022-09-06 ENCOUNTER — HOSPITAL ENCOUNTER (OUTPATIENT)
Dept: MRI IMAGING | Facility: HOSPITAL | Age: 67
Discharge: HOME | End: 2022-09-06
Admitting: FAMILY MEDICINE

## 2022-09-06 DIAGNOSIS — M17.12 PRIMARY OSTEOARTHRITIS OF LEFT KNEE: ICD-10-CM

## 2022-09-06 DIAGNOSIS — M25.562 ACUTE PAIN OF LEFT KNEE: ICD-10-CM

## 2022-09-06 PROCEDURE — 73721 MRI JNT OF LWR EXTRE W/O DYE: CPT

## 2022-09-11 RX ORDER — LISINOPRIL AND HYDROCHLOROTHIAZIDE 25; 20 MG/1; MG/1
TABLET ORAL
Qty: 90 TABLET | Refills: 1 | Status: SHIPPED | OUTPATIENT
Start: 2022-09-11

## 2022-09-15 ENCOUNTER — TELEPHONE (OUTPATIENT)
Dept: FAMILY MEDICINE CLINIC | Facility: CLINIC | Age: 67
End: 2022-09-15

## 2022-09-15 NOTE — TELEPHONE ENCOUNTER
Caller: Sherrie Chand    Relationship: Self    Best call back number: [phone]    What is the best time to reach you: ANYTIME    Who are you requesting to speak with (clinical staff, provider,  specific staff member): CLINICAL    What was the call regarding: PATIENT STATES SHE IS NEEDING HER MRI FAXED TO DR. PÉREZ'S OFFICE.     FAX NUMBER: [phone]

## 2022-09-21 ENCOUNTER — HOSPITAL ENCOUNTER (OUTPATIENT)
Dept: GENERAL RADIOLOGY | Facility: HOSPITAL | Age: 67
Discharge: HOME | End: 2022-09-21

## 2022-09-21 ENCOUNTER — TRANSCRIBE ORDERS (OUTPATIENT)
Dept: ADMINISTRATIVE | Facility: HOSPITAL | Age: 67
End: 2022-09-21

## 2022-09-21 ENCOUNTER — LAB (OUTPATIENT)
Dept: LAB | Facility: HOSPITAL | Age: 67
End: 2022-09-21

## 2022-09-21 ENCOUNTER — HOSPITAL ENCOUNTER (OUTPATIENT)
Dept: CARDIOLOGY | Facility: HOSPITAL | Age: 67
Discharge: HOME | End: 2022-09-21

## 2022-09-21 DIAGNOSIS — Z01.818 PRE-OP TESTING: ICD-10-CM

## 2022-09-21 DIAGNOSIS — Z01.818 PRE-OP TESTING: Primary | ICD-10-CM

## 2022-09-21 LAB
ALBUMIN SERPL-MCNC: 4.4 G/DL (ref 3.5–5.2)
ALBUMIN/GLOB SERPL: 1.8 G/DL
ALP SERPL-CCNC: 82 U/L (ref 39–117)
ALT SERPL W P-5'-P-CCNC: 22 U/L (ref 1–33)
ANION GAP SERPL CALCULATED.3IONS-SCNC: 12.7 MMOL/L (ref 5–15)
AST SERPL-CCNC: 17 U/L (ref 1–32)
BACTERIA UR QL AUTO: ABNORMAL /HPF
BASOPHILS # BLD AUTO: 0.05 10*3/MM3 (ref 0–0.2)
BASOPHILS NFR BLD AUTO: 0.4 % (ref 0–1.5)
BILIRUB SERPL-MCNC: 0.4 MG/DL (ref 0–1.2)
BILIRUB UR QL STRIP: NEGATIVE
BUN SERPL-MCNC: 15 MG/DL (ref 8–23)
BUN/CREAT SERPL: 15.8 (ref 7–25)
CALCIUM SPEC-SCNC: 9.7 MG/DL (ref 8.6–10.5)
CHLORIDE SERPL-SCNC: 100 MMOL/L (ref 98–107)
CLARITY UR: CLEAR
CO2 SERPL-SCNC: 29.3 MMOL/L (ref 22–29)
COLOR UR: YELLOW
CREAT SERPL-MCNC: 0.95 MG/DL (ref 0.57–1)
DEPRECATED RDW RBC AUTO: 42.8 FL (ref 37–54)
EGFRCR SERPLBLD CKD-EPI 2021: 66.6 ML/MIN/1.73
EOSINOPHIL # BLD AUTO: 0.07 10*3/MM3 (ref 0–0.4)
EOSINOPHIL NFR BLD AUTO: 0.5 % (ref 0.3–6.2)
ERYTHROCYTE [DISTWIDTH] IN BLOOD BY AUTOMATED COUNT: 12.8 % (ref 12.3–15.4)
GLOBULIN UR ELPH-MCNC: 2.4 GM/DL
GLUCOSE SERPL-MCNC: 143 MG/DL (ref 65–99)
GLUCOSE UR STRIP-MCNC: NEGATIVE MG/DL
HCT VFR BLD AUTO: 37.6 % (ref 34–46.6)
HGB BLD-MCNC: 12.8 G/DL (ref 12–15.9)
HGB UR QL STRIP.AUTO: NEGATIVE
HYALINE CASTS UR QL AUTO: ABNORMAL /LPF
IMM GRANULOCYTES # BLD AUTO: 0.11 10*3/MM3 (ref 0–0.05)
IMM GRANULOCYTES NFR BLD AUTO: 0.8 % (ref 0–0.5)
KETONES UR QL STRIP: NEGATIVE
LEUKOCYTE ESTERASE UR QL STRIP.AUTO: NEGATIVE
LYMPHOCYTES # BLD AUTO: 2.46 10*3/MM3 (ref 0.7–3.1)
LYMPHOCYTES NFR BLD AUTO: 17.8 % (ref 19.6–45.3)
MCH RBC QN AUTO: 31.1 PG (ref 26.6–33)
MCHC RBC AUTO-ENTMCNC: 34 G/DL (ref 31.5–35.7)
MCV RBC AUTO: 91.5 FL (ref 79–97)
MONOCYTES # BLD AUTO: 0.97 10*3/MM3 (ref 0.1–0.9)
MONOCYTES NFR BLD AUTO: 7 % (ref 5–12)
NEUTROPHILS NFR BLD AUTO: 10.15 10*3/MM3 (ref 1.7–7)
NEUTROPHILS NFR BLD AUTO: 73.5 % (ref 42.7–76)
NITRITE UR QL STRIP: POSITIVE
NRBC BLD AUTO-RTO: 0 /100 WBC (ref 0–0.2)
PH UR STRIP.AUTO: 6 [PH] (ref 5–8)
PLATELET # BLD AUTO: 372 10*3/MM3 (ref 140–450)
PMV BLD AUTO: 10.5 FL (ref 6–12)
POTASSIUM SERPL-SCNC: 4.5 MMOL/L (ref 3.5–5.2)
PROT SERPL-MCNC: 6.8 G/DL (ref 6–8.5)
PROT UR QL STRIP: NEGATIVE
RBC # BLD AUTO: 4.11 10*6/MM3 (ref 3.77–5.28)
RBC # UR STRIP: ABNORMAL /HPF
REF LAB TEST METHOD: ABNORMAL
SODIUM SERPL-SCNC: 142 MMOL/L (ref 136–145)
SP GR UR STRIP: 1.02 (ref 1–1.03)
SQUAMOUS #/AREA URNS HPF: ABNORMAL /HPF
UROBILINOGEN UR QL STRIP: ABNORMAL
WBC # UR STRIP: ABNORMAL /HPF
WBC NRBC COR # BLD: 13.81 10*3/MM3 (ref 3.4–10.8)

## 2022-09-21 PROCEDURE — 93010 ELECTROCARDIOGRAM REPORT: CPT | Performed by: INTERNAL MEDICINE

## 2022-09-21 PROCEDURE — 93005 ELECTROCARDIOGRAM TRACING: CPT | Performed by: ORTHOPAEDIC SURGERY

## 2022-09-21 PROCEDURE — 36415 COLL VENOUS BLD VENIPUNCTURE: CPT

## 2022-09-21 PROCEDURE — 71046 X-RAY EXAM CHEST 2 VIEWS: CPT

## 2022-09-21 PROCEDURE — 87086 URINE CULTURE/COLONY COUNT: CPT

## 2022-09-21 PROCEDURE — 87088 URINE BACTERIA CULTURE: CPT

## 2022-09-21 PROCEDURE — 85025 COMPLETE CBC W/AUTO DIFF WBC: CPT

## 2022-09-21 PROCEDURE — 81001 URINALYSIS AUTO W/SCOPE: CPT

## 2022-09-21 PROCEDURE — 80053 COMPREHEN METABOLIC PANEL: CPT

## 2022-09-21 PROCEDURE — 87186 SC STD MICRODIL/AGAR DIL: CPT

## 2022-09-23 LAB
BACTERIA SPEC AEROBE CULT: ABNORMAL
QT INTERVAL: 320 MS

## 2022-10-17 RX ORDER — CYCLOBENZAPRINE HCL 10 MG
TABLET ORAL
Qty: 90 TABLET | Refills: 11 | Status: SHIPPED | OUTPATIENT
Start: 2022-10-17

## 2022-10-19 ENCOUNTER — OFFICE VISIT (OUTPATIENT)
Dept: PAIN MEDICINE | Facility: CLINIC | Age: 67
End: 2022-10-19

## 2022-10-19 VITALS — HEART RATE: 93 BPM | SYSTOLIC BLOOD PRESSURE: 126 MMHG | DIASTOLIC BLOOD PRESSURE: 58 MMHG | OXYGEN SATURATION: 98 %

## 2022-10-19 DIAGNOSIS — G89.4 CHRONIC PAIN SYNDROME: ICD-10-CM

## 2022-10-19 PROCEDURE — 99214 OFFICE O/P EST MOD 30 MIN: CPT | Performed by: ANESTHESIOLOGY

## 2022-10-19 RX ORDER — HYDROCODONE BITARTRATE AND ACETAMINOPHEN 7.5; 325 MG/1; MG/1
1 TABLET ORAL 3 TIMES DAILY PRN
Qty: 90 TABLET | Refills: 0 | Status: SHIPPED | OUTPATIENT
Start: 2022-10-26 | End: 2022-12-21 | Stop reason: SDUPTHER

## 2022-10-19 RX ORDER — HYDROCODONE BITARTRATE AND ACETAMINOPHEN 7.5; 325 MG/1; MG/1
1 TABLET ORAL 3 TIMES DAILY PRN
Qty: 90 TABLET | Refills: 0 | Status: SHIPPED | OUTPATIENT
Start: 2022-11-25 | End: 2022-12-21 | Stop reason: SDUPTHER

## 2022-10-19 NOTE — PROGRESS NOTES
CC  hip/buttock, right leg, lower back pain  67-year-old female with chronic back pain/postlaminectomy syndrome history of fusion L2 to to S1, here for follow-up.  She recently underwent left knee TKA with Dr. Zamudio, recovering well.  Has first follow-up tomorrow.  Chronic back pain radiating to bilateral lower extremity worse on the left with aching numbness in both legs worse with standing walking or activity.  Denies new weakness saddle anesthesia bladder bowel incontinence.    Pain interfering with daily activity and sleep.  Marginal relief with physical therapy or anti-inflammatories.  Marginal relief with caudal JESSICA in the past.  Declines LESI due to past experience with post dural puncture headache.  Mild relief with transforaminal JESSICA.      Utilizes hydrocodone with good relief functional benefit and side effects.      CT abdomen pelvis : L2-S1 posterior spinal fusion hardware appears intact. L4 lobectomy with interbody strut is in place. Advanced degenerative disc endplate changes  are present at the L1-2 level. No acute osseous abnormalities are identified.  L-spine MRI 2016: Multilevel degenerative disc disease and degenerative facet change as well as  multilevel postoperative changes detailed above.  There is a 14 mm of anterolisthesis of L4 on L5 which has worsened from the prior exam where this was only 4 mm.  This results in severe bilateral neural foraminal narrowing but no spinal canal stenosis.    Pain Assessment   Cause of Pain: surgery  Location of Pain: Lower Back, R Hip, L Hip, R Leg, L Leg  Description of Pain: Dull/Aching, Throbbing, Stabbing  Previous Pain Rating : 7  Current Pain Ratin  Aggravating Factors: Activity  Alleviating Factors: Rest, Medication  Previous Treatments: Epidural Steroids, Narcotic Pain Medication, Physical Therapy  Previous Diagnostic Studies: MRI   PEG Assessment   What number best describes your pain on average in the past week?7  What number best describes  how, during the past week, pain has interfered with your enjoyment of life?4  What number best describes how, during the past week, pain has interfered with your general activity? 10     has a past medical history of Arthritis, Depression, Low back pain, Osteoporosis, and Spinal stenosis (2017).     has a past surgical history that includes Cervical Curettage; Anterior cervical discectomy; Total vaginal hysterectomy; Appendectomy; Back surgery (); and Mediastinotomy for exploration / drainage / biopsy / removal Fb w/ cervical approach (2011).     Social History     Tobacco Use   • Smoking status: Former     Packs/day: 0.50     Years: 30.00     Pack years: 15.00     Types: Cigarettes     Quit date:      Years since quittin.8   • Smokeless tobacco: Never   Substance Use Topics   • Alcohol use: No     Review of Systems        See HPI      Vitals:    10/19/22 0846   BP: 126/58   Pulse: 93   SpO2: 98%     Physical Exam  Vitals reviewed.   Constitutional:       General: She is not in acute distress.  Pulmonary:      Effort: Pulmonary effort is normal.   Musculoskeletal:      Lumbar back: Tenderness present. Positive right straight leg raise test.      Comments: Lumbar loading positive, pain on extension of low back past 5 degrees.  TTP on the lumbar facets noted.       PHQ 9 on chart                     opioid risk tool low risk      Assessment /Plan     Diagnoses and all orders for this visit:    1. Chronic pain syndrome  -     HYDROcodone-acetaminophen (NORCO) 7.5-325 MG per tablet; Take 1 tablet by mouth 3 (Three) Times a Day As Needed for Severe Pain. DNF before 2022  Dispense: 90 tablet; Refill: 0  -     HYDROcodone-acetaminophen (NORCO) 7.5-325 MG per tablet; Take 1 tablet by mouth 3 (Three) Times a Day As Needed for Severe Pain.  Dispense: 90 tablet; Refill: 0      Summary   67-year-old female with chronic back pain here for follow-up.    chronic pain from lumbar DDD spondylosis with  radicular pain.  Chronic polyarthralgia.  Repeat right L4-L5 transforaminal JESSICA or SI injection as needed.    She recently underwent left knee TKA with Dr. Zamudio, recovering well.  Has first follow-up tomorrow.  Denies any new issues.    Continue hydrocodone 7.5/25 3 times daily.  UDS and inspect reviewed.  Discussed risk of tolerance, dependence, respiratory depression, coma and death associated with use of oral opioids for treatment of chronic nonmalignant pain.      Continue gabapentin/flexeril.  Cont. topical compounded neuropathic/anti-inflammatory cream with ketamine.      RTC 2 mo

## 2022-11-18 ENCOUNTER — TELEPHONE (OUTPATIENT)
Dept: FAMILY MEDICINE CLINIC | Facility: CLINIC | Age: 67
End: 2022-11-18

## 2022-11-18 NOTE — TELEPHONE ENCOUNTER
CALLED AND SPOKE TO PATIENT SHE JUST HAD TOTAL KNEE REPLACEMENT . SHE WILL GET DONE AT [facility] AS SOON AS SHE IS MOBILE-    I ALSO REMINDED HER OF GETTING COLOGUARD COMPLETE OR WE WOULD RECOMMEND THE COLONOSCOPY-     I RE-FAXED ORDERS OF US TSH, DEXA AND MAMMO

## 2022-11-21 DIAGNOSIS — G89.4 CHRONIC PAIN SYNDROME: ICD-10-CM

## 2022-11-22 RX ORDER — GABAPENTIN 800 MG/1
TABLET ORAL
Qty: 90 TABLET | Refills: 11 | Status: SHIPPED | OUTPATIENT
Start: 2022-11-22

## 2022-11-22 NOTE — TELEPHONE ENCOUNTER
Rx Refill Note  Requested Prescriptions     Pending Prescriptions Disp Refills   • gabapentin (NEURONTIN) 800 MG tablet [Pharmacy Med Name: GABAPENTIN 800 MG TABLET] 90 tablet 0     Sig: TAKE 1 TABLET BY MOUTH THREE TIMES A DAY      Last office visit with prescribing clinician: 10/19/2022      Next office visit with prescribing clinician: 12/21/2022            Ida Soto MA  11/22/22, 08:05 EST

## 2022-12-07 ENCOUNTER — TELEPHONE (OUTPATIENT)
Dept: FAMILY MEDICINE CLINIC | Facility: CLINIC | Age: 67
End: 2022-12-07

## 2022-12-07 DIAGNOSIS — R19.5 POSITIVE COLORECTAL CANCER SCREENING USING COLOGUARD TEST: Primary | ICD-10-CM

## 2022-12-07 NOTE — TELEPHONE ENCOUNTER
HUB TO READ:  ----- Message from Ban Alexander MD sent at 12/7/2022  9:48 AM EST -----  Cologuard test was positive so she does need to consider having a colonoscopy as soon as possible.  There is still a 30% false positive rate so I would not panic but I would get the colonoscopy done as soon as possible staff will send a green folder call if any other questions or concerns or if she has not received the folder by Monday of next week.

## 2022-12-15 ENCOUNTER — OFFICE VISIT (OUTPATIENT)
Dept: FAMILY MEDICINE CLINIC | Facility: CLINIC | Age: 67
End: 2022-12-15

## 2022-12-15 VITALS
HEART RATE: 107 BPM | WEIGHT: 235 LBS | OXYGEN SATURATION: 96 % | SYSTOLIC BLOOD PRESSURE: 130 MMHG | HEIGHT: 63 IN | BODY MASS INDEX: 41.64 KG/M2 | DIASTOLIC BLOOD PRESSURE: 79 MMHG | TEMPERATURE: 97.7 F

## 2022-12-15 DIAGNOSIS — R35.0 URINARY FREQUENCY: ICD-10-CM

## 2022-12-15 DIAGNOSIS — J02.9 SORE THROAT: Primary | ICD-10-CM

## 2022-12-15 LAB
BILIRUB BLD-MCNC: NEGATIVE MG/DL
CLARITY, POC: CLEAR
COLOR UR: YELLOW
EXPIRATION DATE: NORMAL
GLUCOSE UR STRIP-MCNC: NEGATIVE MG/DL
KETONES UR QL: NEGATIVE
LEUKOCYTE EST, POC: NEGATIVE
Lab: NORMAL
NITRITE UR-MCNC: NEGATIVE MG/ML
PH UR: 6.5 [PH] (ref 5–8)
PROT UR STRIP-MCNC: NEGATIVE MG/DL
RBC # UR STRIP: NEGATIVE /UL
SP GR UR: 1.01 (ref 1–1.03)
UROBILINOGEN UR QL: NORMAL

## 2022-12-15 PROCEDURE — 81003 URINALYSIS AUTO W/O SCOPE: CPT | Performed by: NURSE PRACTITIONER

## 2022-12-15 PROCEDURE — 87081 CULTURE SCREEN ONLY: CPT | Performed by: NURSE PRACTITIONER

## 2022-12-15 PROCEDURE — 99213 OFFICE O/P EST LOW 20 MIN: CPT | Performed by: NURSE PRACTITIONER

## 2022-12-15 NOTE — PROGRESS NOTES
"Subjective   Sherrie Chand is a 67 y.o. female presents for   Chief Complaint   Patient presents with   • Cough     Sore throat       Health Maintenance Due   Topic Date Due   • TDAP/TD VACCINES (1 - Tdap) Never done   • ZOSTER VACCINE (1 of 2) Never done   • DXA SCAN  10/31/2021   • MAMMOGRAM  07/13/2022   • INFLUENZA VACCINE  08/01/2022       History of Present Illness   Pt present with cough, sore throat and urinary frequency.  She states she feels like she has to urinate all the time and then does not urinate much.  Discussed water intake and pt reports she rarely drinks water.  Pt instructed on importance of adequate hydration and lack of hydration on bladder spasms.  She also reports sinus drainage 2 days ago and right sided sore throat. She states sx improves throughout the day but worsens in the evening.   Vitals:    12/15/22 1103   BP: 130/79   Pulse: 107   Temp: 97.7 °F (36.5 °C)   SpO2: 96%   Weight: 107 kg (235 lb)   Height: 160 cm (62.99\")     Body mass index is 41.64 kg/m².    Current Outpatient Medications on File Prior to Visit   Medication Sig Dispense Refill   • atorvastatin (LIPITOR) 40 MG tablet TAKE 1 TABLET EVERY NIGHT 90 tablet 3   • cyclobenzaprine (FLEXERIL) 10 MG tablet TAKE 1 TABLET THREE TIMES DAILY AS NEEDED FOR MUSCLE SPASM(S) 90 tablet 11   • DULoxetine (CYMBALTA) 60 MG capsule TAKE 1 CAPSULE EVERY DAY 90 capsule 1   • gabapentin (NEURONTIN) 800 MG tablet TAKE 1 TABLET BY MOUTH THREE TIMES A DAY 90 tablet 11   • HYDROcodone-acetaminophen (NORCO) 7.5-325 MG per tablet Take 1 tablet by mouth 3 (Three) Times a Day As Needed for Severe Pain. DNF before 11/25/2022 90 tablet 0   • lisinopril-hydrochlorothiazide (PRINZIDE,ZESTORETIC) 20-25 MG per tablet TAKE 1 TABLET EVERY DAY 90 tablet 1   • Misc. Devices (Bariatric Rollator) misc 1 each Daily. Indications: back pain and needs seat to rest forwalking. 1 each 0   • St Johns Wort 150 MG capsule Take 1 capsule by mouth Daily.     • sulindac " (CLINORIL) 200 MG tablet TAKE 1 TABLET TWICE DAILY 180 tablet 0   • vitamin B-6 (PYRIDOXINE) 100 MG tablet Take 300 mg by mouth Daily.     • ciprofloxacin (CIPRO) 500 MG tablet Take 1 tablet by mouth 2 (Two) Times a Day. 20 tablet 0   • HYDROcodone-acetaminophen (NORCO) 7.5-325 MG per tablet Take 1 tablet by mouth 3 (Three) Times a Day As Needed for Severe Pain. 90 tablet 0     No current facility-administered medications on file prior to visit.       The following portions of the patient's history were reviewed and updated as appropriate: allergies, current medications, past family history, past medical history, past social history, past surgical history, and problem list.    Review of Systems   Constitutional: Negative for chills and fever.   HENT: Positive for rhinorrhea and sore throat. Negative for sinus pressure.    Eyes: Negative for blurred vision.   Respiratory: Positive for cough. Negative for shortness of breath.    Cardiovascular: Negative for chest pain.   Gastrointestinal: Negative for abdominal pain.   Endocrine: Negative.    Genitourinary: Positive for frequency.   Musculoskeletal: Negative for arthralgias and joint swelling.   Skin: Negative for color change.   Allergic/Immunologic: Negative.    Neurological: Negative for dizziness.   Psychiatric/Behavioral: Negative for behavioral problems.       Objective   Physical Exam  Vitals and nursing note reviewed.   Constitutional:       Appearance: Normal appearance. She is well-developed. She is not ill-appearing.   HENT:      Head: Normocephalic and atraumatic.      Right Ear: External ear normal.      Left Ear: External ear normal.      Nose: Mucosal edema present.      Right Sinus: No maxillary sinus tenderness or frontal sinus tenderness.      Left Sinus: No maxillary sinus tenderness or frontal sinus tenderness.      Mouth/Throat:      Pharynx: Posterior oropharyngeal erythema present.      Tonsils: No tonsillar exudate.   Eyes:      Extraocular  Movements: Extraocular movements intact.      Conjunctiva/sclera: Conjunctivae normal.      Pupils: Pupils are equal, round, and reactive to light.   Cardiovascular:      Rate and Rhythm: Normal rate and regular rhythm.      Heart sounds: Normal heart sounds.   Pulmonary:      Effort: Pulmonary effort is normal.      Breath sounds: Normal breath sounds.   Abdominal:      General: Bowel sounds are normal.      Palpations: Abdomen is soft.      Tenderness: There is no abdominal tenderness. There is no right CVA tenderness or left CVA tenderness.   Genitourinary:     Vagina: Normal.   Musculoskeletal:         General: Normal range of motion.      Cervical back: Normal range of motion and neck supple.   Lymphadenopathy:      Cervical: No cervical adenopathy.   Skin:     General: Skin is warm and dry.   Neurological:      General: No focal deficit present.      Mental Status: She is alert and oriented to person, place, and time.   Psychiatric:         Mood and Affect: Mood normal.         Behavior: Behavior normal.         Judgment: Judgment normal.       PHQ-9 Total Score:      Assessment & Plan   Diagnoses and all orders for this visit:    1. Sore throat (Primary)  Comments:  post nasal gtt, instructed to gargle warm salt water, nasocort nasal spray, call for worsening  Orders:  -     Beta Strep Culture, Throat - , Throat; Future  -     Beta Strep Culture, Throat - Swab, Throat    2. Urinary frequency  Comments:  instructed to hydrate well with non-caffeinated beverages, if symptoms persist will recheck  Orders:  -     POCT urinalysis dipstick, automated        There are no Patient Instructions on file for this visit.

## 2022-12-17 LAB — BACTERIA SPEC AEROBE CULT: NORMAL

## 2022-12-21 ENCOUNTER — OFFICE VISIT (OUTPATIENT)
Dept: PAIN MEDICINE | Facility: CLINIC | Age: 67
End: 2022-12-21

## 2022-12-21 VITALS
HEART RATE: 85 BPM | OXYGEN SATURATION: 95 % | RESPIRATION RATE: 16 BRPM | DIASTOLIC BLOOD PRESSURE: 70 MMHG | SYSTOLIC BLOOD PRESSURE: 126 MMHG

## 2022-12-21 DIAGNOSIS — M96.1 POSTLAMINECTOMY SYNDROME OF LUMBAR REGION: ICD-10-CM

## 2022-12-21 DIAGNOSIS — Z79.899 HIGH RISK MEDICATION USE: Primary | ICD-10-CM

## 2022-12-21 DIAGNOSIS — M47.817 LUMBOSACRAL SPONDYLOSIS WITHOUT MYELOPATHY: ICD-10-CM

## 2022-12-21 DIAGNOSIS — G89.4 CHRONIC PAIN SYNDROME: ICD-10-CM

## 2022-12-21 DIAGNOSIS — M25.562 ACUTE PAIN OF LEFT KNEE: ICD-10-CM

## 2022-12-21 DIAGNOSIS — M25.50 POLYARTHRALGIA: ICD-10-CM

## 2022-12-21 PROCEDURE — 99214 OFFICE O/P EST MOD 30 MIN: CPT | Performed by: ANESTHESIOLOGY

## 2022-12-21 RX ORDER — HYDROCODONE BITARTRATE AND ACETAMINOPHEN 7.5; 325 MG/1; MG/1
1 TABLET ORAL 3 TIMES DAILY PRN
Qty: 90 TABLET | Refills: 0 | Status: SHIPPED | OUTPATIENT
Start: 2023-01-23 | End: 2023-02-15 | Stop reason: SDUPTHER

## 2022-12-21 RX ORDER — HYDROCODONE BITARTRATE AND ACETAMINOPHEN 7.5; 325 MG/1; MG/1
1 TABLET ORAL 3 TIMES DAILY PRN
Qty: 90 TABLET | Refills: 0 | Status: SHIPPED | OUTPATIENT
Start: 2022-12-21 | End: 2023-02-15 | Stop reason: SDUPTHER

## 2022-12-21 NOTE — PROGRESS NOTES
CC  hip/buttock, right leg, lower back pain  67-year-old female with chronic back pain/postlaminectomy syndrome history of fusion L2 to to S1, here for follow-up.  Recovered well from left knee TKA.  Denies new complaints today except slight worsening of back pain.  Declines injection at this time.   Chronic back pain radiating to bilateral lower extremity worse on the left with aching numbness in both legs worse with standing walking or activity.  Denies new weakness saddle anesthesia bladder bowel incontinence.    Pain interfering with daily activity and sleep.  Marginal relief with physical therapy or anti-inflammatories.  Marginal relief with caudal JESSICA in the past.  Declines LESI due to past experience with post dural puncture headache.  Mild relief with transforaminal JESSICA.      Utilizes hydrocodone with good relief functional benefit and side effects.      CT abdomen pelvis : L2-S1 posterior spinal fusion hardware appears intact. L4 lobectomy with interbody strut is in place. Advanced degenerative disc endplate changes  are present at the L1-2 level. No acute osseous abnormalities are identified.  L-spine MRI 2016: Multilevel degenerative disc disease and degenerative facet change as well as  multilevel postoperative changes detailed above.  There is a 14 mm of anterolisthesis of L4 on L5 which has worsened from the prior exam where this was only 4 mm.  This results in severe bilateral neural foraminal narrowing but no spinal canal stenosis.    Pain Assessment   Cause of Pain: surgery  Location of Pain: Lower Back, R Hip, L Hip, R Leg, L Leg  Description of Pain: Dull/Aching, Throbbing, Stabbing  Previous Pain Rating : 7  Current Pain Ratin  Aggravating Factors: Activity  Alleviating Factors: Rest, Medication  Previous Treatments: Epidural Steroids, Narcotic Pain Medication, Physical Therapy  Previous Diagnostic Studies: MRI   PEG Assessment   What number best describes your pain on average in the past  week?7  What number best describes how, during the past week, pain has interfered with your enjoyment of life?6  What number best describes how, during the past week, pain has interfered with your general activity? 10     has a past medical history of Arthritis, Depression, Low back pain, Osteoporosis, and Spinal stenosis (2017).     has a past surgical history that includes Cervical Curettage; Anterior cervical discectomy; Total vaginal hysterectomy; Appendectomy; Back surgery (); Mediastinotomy for exploration / drainage / biopsy / removal Fb w/ cervical approach (2011); and Replacement total knee (Left, 2022).     Social History     Tobacco Use   • Smoking status: Former     Packs/day: 0.50     Years: 30.00     Pack years: 15.00     Types: Cigarettes     Quit date:      Years since quittin.9   • Smokeless tobacco: Never   Substance Use Topics   • Alcohol use: No     Review of Systems        See HPI      Vitals:    22 0830   BP: 126/70   Pulse: 85   Resp: 16   SpO2: 95%     Physical Exam  Vitals reviewed.   Constitutional:       General: She is not in acute distress.  Pulmonary:      Effort: Pulmonary effort is normal.   Musculoskeletal:      Lumbar back: Tenderness present. Positive right straight leg raise test.      Comments: Lumbar loading positive, pain on extension of low back past 5 degrees.  TTP on the lumbar facets noted.       PHQ 9 on chart                     opioid risk tool low risk      Assessment /Plan     Diagnoses and all orders for this visit:    1. Chronic pain syndrome (Primary)  -     HYDROcodone-acetaminophen (NORCO) 7.5-325 MG per tablet; Take 1 tablet by mouth 3 (Three) Times a Day As Needed for Severe Pain. DNF before 2023  Dispense: 90 tablet; Refill: 0  -     HYDROcodone-acetaminophen (NORCO) 7.5-325 MG per tablet; Take 1 tablet by mouth 3 (Three) Times a Day As Needed for Severe Pain.  Dispense: 90 tablet; Refill: 0    2. Postlaminectomy syndrome of  lumbar region    3. Acute pain of left knee    4. Polyarthralgia    5. Lumbosacral spondylosis without myelopathy    6. High risk medication use      Summary   67-year-old female with chronic back pain here for follow-up.    chronic pain from lumbar DDD spondylosis with radicular pain.  Chronic polyarthralgia.  Repeat right L4-L5 transforaminal JESSICA or SI injection as needed.    Recovered well from left knee TKA.  Denies new complaints today except slight worsening of back pain.  Declines injection at this time.     Continue hydrocodone 7.5/25 3 times daily.  UDS and inspect reviewed.  Discussed risk of tolerance, dependence, respiratory depression, coma and death associated with use of oral opioids for treatment of chronic nonmalignant pain.      Continue gabapentin/flexeril.  Cont. topical compounded neuropathic/anti-inflammatory cream with ketamine.      RTC 2 mo

## 2022-12-22 ENCOUNTER — TELEPHONE (OUTPATIENT)
Dept: FAMILY MEDICINE CLINIC | Facility: CLINIC | Age: 67
End: 2022-12-22

## 2022-12-22 RX ORDER — SULINDAC 200 MG/1
200 TABLET ORAL 2 TIMES DAILY
Qty: 180 TABLET | Refills: 0 | Status: SHIPPED | OUTPATIENT
Start: 2022-12-22

## 2022-12-22 NOTE — TELEPHONE ENCOUNTER
----- Message from Sherrie Chand sent at 12/22/2022 10:00 AM EST -----  Regarding: Refill  Contact: [phone]  I need a refill of my Sulindac 200 my x2 faxed to my [facility] pharmacy please. Have a Carmelita Jin

## 2022-12-27 DIAGNOSIS — Z78.0 POST-MENOPAUSAL: ICD-10-CM

## 2022-12-30 ENCOUNTER — TELEPHONE (OUTPATIENT)
Dept: FAMILY MEDICINE CLINIC | Facility: CLINIC | Age: 67
End: 2022-12-30

## 2022-12-30 NOTE — TELEPHONE ENCOUNTER
HUB TO READ:  ----- Message from Ban Alexander MD sent at 12/29/2022  5:43 PM EST -----  mammogram normal.  Ordered for one year unless change in symptoms or physical

## 2023-02-14 ENCOUNTER — OFFICE (OUTPATIENT)
Dept: URBAN - METROPOLITAN AREA PATHOLOGY 4 | Facility: PATHOLOGY | Age: 68
End: 2023-02-14
Payer: MEDICARE

## 2023-02-14 ENCOUNTER — OFFICE (OUTPATIENT)
Dept: URBAN - METROPOLITAN AREA PATHOLOGY 4 | Facility: PATHOLOGY | Age: 68
End: 2023-02-14
Payer: COMMERCIAL

## 2023-02-14 ENCOUNTER — ON CAMPUS - OUTPATIENT (OUTPATIENT)
Dept: URBAN - METROPOLITAN AREA HOSPITAL 2 | Facility: HOSPITAL | Age: 68
End: 2023-02-14
Payer: MEDICARE

## 2023-02-14 VITALS
DIASTOLIC BLOOD PRESSURE: 62 MMHG | WEIGHT: 238 LBS | RESPIRATION RATE: 13 BRPM | HEART RATE: 85 BPM | RESPIRATION RATE: 22 BRPM | HEART RATE: 91 BPM | DIASTOLIC BLOOD PRESSURE: 63 MMHG | RESPIRATION RATE: 17 BRPM | SYSTOLIC BLOOD PRESSURE: 107 MMHG | OXYGEN SATURATION: 98 % | DIASTOLIC BLOOD PRESSURE: 65 MMHG | SYSTOLIC BLOOD PRESSURE: 128 MMHG | HEART RATE: 86 BPM | DIASTOLIC BLOOD PRESSURE: 74 MMHG | DIASTOLIC BLOOD PRESSURE: 53 MMHG | HEART RATE: 87 BPM | RESPIRATION RATE: 18 BRPM | TEMPERATURE: 97.7 F | HEIGHT: 64 IN | SYSTOLIC BLOOD PRESSURE: 140 MMHG | SYSTOLIC BLOOD PRESSURE: 110 MMHG | HEART RATE: 103 BPM | OXYGEN SATURATION: 96 % | SYSTOLIC BLOOD PRESSURE: 102 MMHG | OXYGEN SATURATION: 97 % | SYSTOLIC BLOOD PRESSURE: 111 MMHG | SYSTOLIC BLOOD PRESSURE: 115 MMHG | OXYGEN SATURATION: 99 % | HEART RATE: 83 BPM | DIASTOLIC BLOOD PRESSURE: 78 MMHG | SYSTOLIC BLOOD PRESSURE: 117 MMHG | DIASTOLIC BLOOD PRESSURE: 86 MMHG

## 2023-02-14 DIAGNOSIS — R19.7 DIARRHEA, UNSPECIFIED: ICD-10-CM

## 2023-02-14 DIAGNOSIS — R19.5 OTHER FECAL ABNORMALITIES: ICD-10-CM

## 2023-02-14 DIAGNOSIS — K62.1 RECTAL POLYP: ICD-10-CM

## 2023-02-14 LAB
GI HISTOLOGY: A. UNSPECIFIED: (no result)
GI HISTOLOGY: B. UNSPECIFIED: (no result)
GI HISTOLOGY: PDF REPORT: (no result)

## 2023-02-14 PROCEDURE — 88305 TISSUE EXAM BY PATHOLOGIST: CPT | Mod: 26 | Performed by: INTERNAL MEDICINE

## 2023-02-14 PROCEDURE — 45380 COLONOSCOPY AND BIOPSY: CPT | Performed by: INTERNAL MEDICINE

## 2023-02-15 ENCOUNTER — OFFICE VISIT (OUTPATIENT)
Dept: PAIN MEDICINE | Facility: CLINIC | Age: 68
End: 2023-02-15
Payer: MEDICARE

## 2023-02-15 VITALS
SYSTOLIC BLOOD PRESSURE: 111 MMHG | HEART RATE: 81 BPM | DIASTOLIC BLOOD PRESSURE: 72 MMHG | OXYGEN SATURATION: 96 % | RESPIRATION RATE: 16 BRPM

## 2023-02-15 DIAGNOSIS — Z79.899 HIGH RISK MEDICATION USE: ICD-10-CM

## 2023-02-15 DIAGNOSIS — M54.16 LUMBAR RADICULITIS: ICD-10-CM

## 2023-02-15 DIAGNOSIS — M25.50 POLYARTHRALGIA: ICD-10-CM

## 2023-02-15 DIAGNOSIS — G89.4 CHRONIC PAIN SYNDROME: ICD-10-CM

## 2023-02-15 DIAGNOSIS — M96.1 POSTLAMINECTOMY SYNDROME OF LUMBAR REGION: Primary | ICD-10-CM

## 2023-02-15 PROCEDURE — 99214 OFFICE O/P EST MOD 30 MIN: CPT | Performed by: ANESTHESIOLOGY

## 2023-02-15 RX ORDER — HYDROCODONE BITARTRATE AND ACETAMINOPHEN 7.5; 325 MG/1; MG/1
1 TABLET ORAL 3 TIMES DAILY PRN
Qty: 90 TABLET | Refills: 0 | Status: SHIPPED | OUTPATIENT
Start: 2023-03-22 | End: 2023-03-16

## 2023-02-15 RX ORDER — HYDROCODONE BITARTRATE AND ACETAMINOPHEN 7.5; 325 MG/1; MG/1
1 TABLET ORAL 3 TIMES DAILY PRN
Qty: 90 TABLET | Refills: 0 | Status: SHIPPED | OUTPATIENT
Start: 2023-02-22

## 2023-02-15 RX ORDER — HYDROCODONE BITARTRATE AND ACETAMINOPHEN 7.5; 325 MG/1; MG/1
TABLET ORAL
COMMUNITY
Start: 2022-12-22

## 2023-02-15 NOTE — PROGRESS NOTES
CC  hip/buttock, right leg, lower back pain  68-year-old female with chronic back pain/postlaminectomy syndrome history of fusion L2 to to S1, polyarthralgia, S/P left TKA, here for follow-up.  Continued worsening  back pain radiating to both legs.  Chronic back pain radiating to bilateral lower extremity worse on the left with aching numbness in both legs worse with standing walking or activity.  Denies new weakness saddle anesthesia bladder bowel incontinence.    Pain interfering with daily activity and sleep.  Marginal relief with physical therapy or anti-inflammatories.  Marginal relief with caudal JESSICA in the past.  Declines LESI due to past experience with post dural puncture headache.  Mild relief with transforaminal JESSICA.      Utilizes hydrocodone with good relief functional benefit and side effects.      CT abdomen pelvis : L2-S1 posterior spinal fusion hardware appears intact. L4 lobectomy with interbody strut is in place. Advanced degenerative disc endplate changes  are present at the L1-2 level. No acute osseous abnormalities are identified.  L-spine MRI 2016: Multilevel degenerative disc disease and degenerative facet change as well as  multilevel postoperative changes detailed above.  There is a 14 mm of anterolisthesis of L4 on L5 which has worsened from the prior exam where this was only 4 mm.  This results in severe bilateral neural foraminal narrowing but no spinal canal stenosis.    Pain Assessment   Cause of Pain: surgery  Location of Pain: Lower Back, R Hip, L Hip, R Leg, L Leg  Description of Pain: Dull/Aching, Throbbing, Stabbing  Previous Pain Rating : 7  Current Pain Ratin  Aggravating Factors: Activity  Alleviating Factors: Rest, Medication  Previous Treatments: Epidural Steroids, Narcotic Pain Medication, Physical Therapy  Previous Diagnostic Studies: MRI   PEG Assessment   What number best describes your pain on average in the past week?7  What number best describes how, during the  past week, pain has interfered with your enjoyment of life?4  What number best describes how, during the past week, pain has interfered with your general activity? 9     has a past medical history of Arthritis, Depression, Low back pain, Osteoporosis, and Spinal stenosis (2017).     has a past surgical history that includes Cervical Curettage; Anterior cervical discectomy; Total vaginal hysterectomy; Appendectomy; Back surgery (2017); Mediastinotomy for exploration / drainage / biopsy / removal Fb w/ cervical approach (2011); and Replacement total knee (Left, 2022).     Social History     Tobacco Use   • Smoking status: Former     Packs/day: 0.50     Years: 30.00     Pack years: 15.00     Types: Cigarettes     Quit date:      Years since quittin.1   • Smokeless tobacco: Never   Substance Use Topics   • Alcohol use: No     Review of Systems        See HPI      Vitals:    02/15/23 0844   BP: 111/72   Pulse: 81   Resp: 16   SpO2: 96%     Physical Exam  Vitals reviewed.   Constitutional:       General: She is not in acute distress.  Pulmonary:      Effort: Pulmonary effort is normal.   Musculoskeletal:      Lumbar back: Tenderness present. Positive right straight leg raise test.      Comments: Lumbar loading positive, pain on extension of low back past 5 degrees.  TTP on the lumbar facets noted.       PHQ 9 on chart                     opioid risk tool low risk      Assessment /Plan   Diagnoses and all orders for this visit:    1. Postlaminectomy syndrome of lumbar region (Primary)    2. Chronic pain syndrome  -     HYDROcodone-acetaminophen (NORCO) 7.5-325 MG per tablet; Take 1 tablet by mouth 3 (Three) Times a Day As Needed for Severe Pain. DNF before 3/22/2023  Dispense: 90 tablet; Refill: 0  -     HYDROcodone-acetaminophen (NORCO) 7.5-325 MG per tablet; Take 1 tablet by mouth 3 (Three) Times a Day As Needed for Severe Pain.  Dispense: 90 tablet; Refill: 0    3. Lumbar radiculitis    4.  Polyarthralgia    5. High risk medication use    Summary   68-year-old female with chronic back pain here for follow-up.    chronic pain from lumbar DDD spondylosis with radicular pain.  Chronic polyarthralgia.  Repeat right L4-L5 transforaminal JESSICA or SI injection as needed.    Continued worsening  back pain radiating to both legs.  Discussed caudal JESSICA.  She will consider.  We need Valium prior to procedure.    Continue hydrocodone 7.5/25 3 times daily.  UDS and inspect reviewed.  Discussed risk of tolerance, dependence, respiratory depression, coma and death associated with use of oral opioids for treatment of chronic nonmalignant pain.      Continue gabapentin/flexeril.  Cont. topical compounded neuropathic/anti-inflammatory cream with ketamine.      RTC 2 mo

## 2023-03-14 NOTE — PATIENT INSTRUCTIONS
Health Maintenance Due   Topic Date Due    TDAP/TD VACCINES (1 - Tdap) Never done    ZOSTER VACCINE (1 of 2) Never done    INFLUENZA VACCINE  08/01/2022    Advise patient get US Thyroid

## 2023-03-16 ENCOUNTER — OFFICE VISIT (OUTPATIENT)
Dept: FAMILY MEDICINE CLINIC | Facility: CLINIC | Age: 68
End: 2023-03-16
Payer: MEDICARE

## 2023-03-16 VITALS
OXYGEN SATURATION: 97 % | WEIGHT: 243.8 LBS | TEMPERATURE: 97.8 F | HEART RATE: 91 BPM | BODY MASS INDEX: 43.2 KG/M2 | DIASTOLIC BLOOD PRESSURE: 74 MMHG | HEIGHT: 63 IN | SYSTOLIC BLOOD PRESSURE: 114 MMHG

## 2023-03-16 DIAGNOSIS — E04.2 MULTIPLE THYROID NODULES: ICD-10-CM

## 2023-03-16 DIAGNOSIS — E66.01 CLASS 3 SEVERE OBESITY DUE TO EXCESS CALORIES WITH SERIOUS COMORBIDITY AND BODY MASS INDEX (BMI) OF 40.0 TO 44.9 IN ADULT: ICD-10-CM

## 2023-03-16 DIAGNOSIS — F41.9 ANXIETY: ICD-10-CM

## 2023-03-16 DIAGNOSIS — E53.8 B12 DEFICIENCY: Primary | ICD-10-CM

## 2023-03-16 DIAGNOSIS — R23.2 HOT FLASHES: ICD-10-CM

## 2023-03-16 LAB
ALBUMIN SERPL-MCNC: 4.3 G/DL (ref 3.5–5.2)
ALBUMIN/GLOB SERPL: 1.5 G/DL
ALP SERPL-CCNC: 89 U/L (ref 39–117)
ALT SERPL W P-5'-P-CCNC: 26 U/L (ref 1–33)
ANION GAP SERPL CALCULATED.3IONS-SCNC: 10.2 MMOL/L (ref 5–15)
AST SERPL-CCNC: 16 U/L (ref 1–32)
BASOPHILS # BLD AUTO: 0.05 10*3/MM3 (ref 0–0.2)
BASOPHILS NFR BLD AUTO: 0.4 % (ref 0–1.5)
BILIRUB SERPL-MCNC: 0.3 MG/DL (ref 0–1.2)
BILIRUB UR QL STRIP: NEGATIVE
BUN SERPL-MCNC: 17 MG/DL (ref 8–23)
BUN/CREAT SERPL: 15.6 (ref 7–25)
CALCIUM SPEC-SCNC: 10.3 MG/DL (ref 8.6–10.5)
CHLORIDE SERPL-SCNC: 102 MMOL/L (ref 98–107)
CLARITY UR: CLEAR
CO2 SERPL-SCNC: 28.8 MMOL/L (ref 22–29)
COLOR UR: YELLOW
CREAT SERPL-MCNC: 1.09 MG/DL (ref 0.57–1)
CRP SERPL-MCNC: 1.74 MG/DL (ref 0–0.5)
DEPRECATED RDW RBC AUTO: 45.5 FL (ref 37–54)
EGFRCR SERPLBLD CKD-EPI 2021: 56.1 ML/MIN/1.73
EOSINOPHIL # BLD AUTO: 0.11 10*3/MM3 (ref 0–0.4)
EOSINOPHIL NFR BLD AUTO: 0.8 % (ref 0.3–6.2)
ERYTHROCYTE [DISTWIDTH] IN BLOOD BY AUTOMATED COUNT: 13.5 % (ref 12.3–15.4)
ERYTHROCYTE [SEDIMENTATION RATE] IN BLOOD: 20 MM/HR (ref 0–30)
GLOBULIN UR ELPH-MCNC: 2.9 GM/DL
GLUCOSE SERPL-MCNC: 118 MG/DL (ref 65–99)
GLUCOSE UR STRIP-MCNC: NEGATIVE MG/DL
HCT VFR BLD AUTO: 37.2 % (ref 34–46.6)
HGB BLD-MCNC: 12.6 G/DL (ref 12–15.9)
HGB UR QL STRIP.AUTO: NEGATIVE
IMM GRANULOCYTES # BLD AUTO: 0.11 10*3/MM3 (ref 0–0.05)
IMM GRANULOCYTES NFR BLD AUTO: 0.8 % (ref 0–0.5)
KETONES UR QL STRIP: NEGATIVE
LEUKOCYTE ESTERASE UR QL STRIP.AUTO: NEGATIVE
LYMPHOCYTES # BLD AUTO: 1.95 10*3/MM3 (ref 0.7–3.1)
LYMPHOCYTES NFR BLD AUTO: 14.7 % (ref 19.6–45.3)
MAGNESIUM SERPL-MCNC: 1.8 MG/DL (ref 1.6–2.4)
MCH RBC QN AUTO: 31.2 PG (ref 26.6–33)
MCHC RBC AUTO-ENTMCNC: 33.9 G/DL (ref 31.5–35.7)
MCV RBC AUTO: 92.1 FL (ref 79–97)
MONOCYTES # BLD AUTO: 0.9 10*3/MM3 (ref 0.1–0.9)
MONOCYTES NFR BLD AUTO: 6.8 % (ref 5–12)
NEUTROPHILS NFR BLD AUTO: 10.19 10*3/MM3 (ref 1.7–7)
NEUTROPHILS NFR BLD AUTO: 76.5 % (ref 42.7–76)
NITRITE UR QL STRIP: NEGATIVE
NRBC BLD AUTO-RTO: 0 /100 WBC (ref 0–0.2)
PH UR STRIP.AUTO: 5.5 [PH] (ref 5–8)
PLATELET # BLD AUTO: 376 10*3/MM3 (ref 140–450)
PMV BLD AUTO: 10.8 FL (ref 6–12)
POTASSIUM SERPL-SCNC: 4.2 MMOL/L (ref 3.5–5.2)
PROT SERPL-MCNC: 7.2 G/DL (ref 6–8.5)
PROT UR QL STRIP: NEGATIVE
RBC # BLD AUTO: 4.04 10*6/MM3 (ref 3.77–5.28)
SODIUM SERPL-SCNC: 141 MMOL/L (ref 136–145)
SP GR UR STRIP: 1.02 (ref 1–1.03)
TSH SERPL DL<=0.05 MIU/L-ACNC: 1.36 UIU/ML (ref 0.27–4.2)
UROBILINOGEN UR QL STRIP: NORMAL
VIT B12 BLD-MCNC: 434 PG/ML (ref 211–946)
WBC NRBC COR # BLD: 13.31 10*3/MM3 (ref 3.4–10.8)

## 2023-03-16 PROCEDURE — 1159F MED LIST DOCD IN RCRD: CPT | Performed by: PREVENTIVE MEDICINE

## 2023-03-16 PROCEDURE — 82607 VITAMIN B-12: CPT | Performed by: PREVENTIVE MEDICINE

## 2023-03-16 PROCEDURE — 85652 RBC SED RATE AUTOMATED: CPT | Performed by: PREVENTIVE MEDICINE

## 2023-03-16 PROCEDURE — 80053 COMPREHEN METABOLIC PANEL: CPT | Performed by: PREVENTIVE MEDICINE

## 2023-03-16 PROCEDURE — 83735 ASSAY OF MAGNESIUM: CPT | Performed by: PREVENTIVE MEDICINE

## 2023-03-16 PROCEDURE — 85025 COMPLETE CBC W/AUTO DIFF WBC: CPT | Performed by: PREVENTIVE MEDICINE

## 2023-03-16 PROCEDURE — 3074F SYST BP LT 130 MM HG: CPT | Performed by: PREVENTIVE MEDICINE

## 2023-03-16 PROCEDURE — 3078F DIAST BP <80 MM HG: CPT | Performed by: PREVENTIVE MEDICINE

## 2023-03-16 PROCEDURE — 1160F RVW MEDS BY RX/DR IN RCRD: CPT | Performed by: PREVENTIVE MEDICINE

## 2023-03-16 PROCEDURE — 36415 COLL VENOUS BLD VENIPUNCTURE: CPT | Performed by: PREVENTIVE MEDICINE

## 2023-03-16 PROCEDURE — 81003 URINALYSIS AUTO W/O SCOPE: CPT | Performed by: PREVENTIVE MEDICINE

## 2023-03-16 PROCEDURE — 99214 OFFICE O/P EST MOD 30 MIN: CPT | Performed by: PREVENTIVE MEDICINE

## 2023-03-16 PROCEDURE — 84443 ASSAY THYROID STIM HORMONE: CPT | Performed by: PREVENTIVE MEDICINE

## 2023-03-16 PROCEDURE — 86140 C-REACTIVE PROTEIN: CPT | Performed by: PREVENTIVE MEDICINE

## 2023-03-16 NOTE — PROGRESS NOTES
"Subjective   Sherrie Chand is a 68 y.o. female presents for   Chief Complaint   Patient presents with   • Hot Flashes     On and off for a year, worse recently        Health Maintenance Due   Topic Date Due   • TDAP/TD VACCINES (1 - Tdap) Never done   • ZOSTER VACCINE (1 of 2) Never done       DAXHPI    The patient presents today to follow up on vitamin B12 deficiency, multiple thyroid nodules, anxiety, hot flashes and class III obesity.       Patient notes her hot flashes started about 1 year ago but they have progressed recently. Patient states she has a partial hysterectomy in her 30s. Patient states she takes Banks Wart daily.       Patient reports she has been monitoring her portion size, fasting and exercising and still having trouble with weight loss. She notes that she does fasting and normally doesn't eat after 4 PM unless it is a small snack.       Patient states her anxiety is the same. She is still taking Cymbalta 60mg once daily.     Social History  Patient stopped smoking 6 years ago.    Medical history  Patient received her influenza vaccination in 10/2022. Patients allergies are as follows; CARISOPRODOL which causes shortness of breath. Patients annual eye exam in scheduled for 04/2023. Patient does not do an annual dental exam due to having dentures. Patient is current on mammogram.       Vitals:    03/16/23 0916 03/16/23 0917 03/16/23 0919   BP: 127/80 124/81 114/74   BP Location: Right arm Left arm Left arm   Patient Position: Sitting Sitting Standing   Cuff Size: Adult Adult Adult   Pulse: 91  91   Temp: 97.8 °F (36.6 °C)     TempSrc: Temporal     SpO2: 97%  97%   Weight: 111 kg (243 lb 12.8 oz)     Height: 160 cm (62.99\")       Body mass index is 43.2 kg/m².    Current Outpatient Medications on File Prior to Visit   Medication Sig Dispense Refill   • atorvastatin (LIPITOR) 40 MG tablet TAKE 1 TABLET EVERY NIGHT 90 tablet 3   • cyclobenzaprine (FLEXERIL) 10 MG tablet TAKE 1 TABLET THREE " TIMES DAILY AS NEEDED FOR MUSCLE SPASM(S) 90 tablet 11   • DULoxetine (CYMBALTA) 60 MG capsule TAKE 1 CAPSULE EVERY DAY 90 capsule 1   • gabapentin (NEURONTIN) 800 MG tablet TAKE 1 TABLET BY MOUTH THREE TIMES A DAY 90 tablet 11   • HYDROcodone-acetaminophen (NORCO) 7.5-325 MG per tablet      • lisinopril-hydrochlorothiazide (PRINZIDE,ZESTORETIC) 20-25 MG per tablet TAKE 1 TABLET EVERY DAY 90 tablet 1   • Misc. Devices (Bariatric Rollator) misc 1 each Daily. Indications: back pain and needs seat to rest forwalking. 1 each 0   • St Johns Wort 150 MG capsule Take 1 capsule by mouth Daily.     • sulindac (CLINORIL) 200 MG tablet Take 1 tablet by mouth 2 (Two) Times a Day. 180 tablet 0   • vitamin B-6 (PYRIDOXINE) 100 MG tablet Take 3 tablets by mouth Daily.     • HYDROcodone-acetaminophen (NORCO) 7.5-325 MG per tablet Take 1 tablet by mouth 3 (Three) Times a Day As Needed for Severe Pain. 90 tablet 0   • [DISCONTINUED] HYDROcodone-acetaminophen (NORCO) 7.5-325 MG per tablet Take 1 tablet by mouth 3 (Three) Times a Day As Needed for Severe Pain. DNF before 3/22/2023 90 tablet 0     No current facility-administered medications on file prior to visit.       The following portions of the patient's history were reviewed and updated as appropriate: allergies, current medications, past family history, past medical history, past social history, past surgical history and problem list.    Review of Systems   Constitutional: Negative for appetite change, chills, diaphoresis and fever.   HENT: Negative for hearing loss, tinnitus and trouble swallowing.    Eyes: Negative for blurred vision, double vision and visual disturbance.   Respiratory: Negative for cough, shortness of breath and wheezing.    Cardiovascular: Negative for chest pain and palpitations.   Gastrointestinal: Positive for diarrhea. Negative for abdominal pain, constipation and indigestion.   Genitourinary: Negative for breast discharge, breast lump, breast pain,  dysuria and vaginal discharge.   Neurological: Negative for dizziness, seizures, syncope and headache.   Psychiatric/Behavioral: Negative for suicidal ideas.     DAXROS    Objective   Physical Exam  Constitutional:       Appearance: She is obese.   HENT:      Right Ear: Tympanic membrane normal.      Left Ear: Tympanic membrane normal.      Mouth/Throat:      Comments: - normal but structures look crowded  Eyes:      Pupils: Pupils are equal, round, and reactive to light.   Neck:      Vascular: No carotid bruit.      Comments: - no nodules felt per this examiner  Cardiovascular:      Rate and Rhythm: Normal rate and regular rhythm.      Heart sounds: No murmur heard.    No gallop.   Pulmonary:      Breath sounds: No rales.      Comments: - decreased breath sounds  Abdominal:      General: Bowel sounds are normal.      Palpations: There is no mass.   Musculoskeletal:      Right lower leg: No edema.      Left lower leg: No edema.   Lymphadenopathy:      Cervical: No cervical adenopathy.   Psychiatric:         Mood and Affect: Mood normal.       DAXEXAM  PHQ-9 Total Score:      Assessment & Plan   Diagnoses and all orders for this visit:    1. B12 deficiency (Primary)  Assessment & Plan:  - well controlled  - fasting labs will be obtained today    Orders:  -     Vitamin B12; Future  -     CBC Auto Differential; Future  -     Vitamin B12  -     CBC Auto Differential    2. Multiple thyroid nodules  -     TSH; Future  -     TSH    3. Anxiety  Assessment & Plan:  - well controlled    Orders:  -     Vitamin B12; Future  -     Magnesium; Future  -     Vitamin B12  -     Magnesium    4. Hot flashes  Assessment & Plan:  - urinalysis obtained today  - fasting labs will be obtained    Orders:  -     C-reactive Protein; Future  -     Sedimentation Rate; Future  -     Comprehensive Metabolic Panel; Future  -     Urinalysis With Culture If Indicated - Urine, Clean Catch; Future  -     C-reactive Protein  -     Sedimentation Rate  -      Comprehensive Metabolic Panel  -     Urinalysis With Culture If Indicated - Urine, Clean Catch    5. Class 3 severe obesity due to excess calories with serious comorbidity and body mass index (BMI) of 40.0 to 44.9 in adult (HCC)  Assessment & Plan:  - continue to monitor portion control and increase exercise  - fasting labs will be obtained      Other orders  -     Cancel: Fluzone High-Dose 65+yrs ([id number])      DAXPLAN  DAXRESULTS    Patient Instructions     Health Maintenance Due   Topic Date Due   • TDAP/TD VACCINES (1 - Tdap) Never done   • ZOSTER VACCINE (1 of 2) Never done   • INFLUENZA VACCINE  08/01/2022    Advise patient get US Thyroid            Transcribed from ambient dictation for Ban Alexander MD by Mariah Novak.  03/16/23   12:21 EDT    Patient or patient representative verbalized consent to the visit recording.  I have personally performed the services described in this document as transcribed by the above individual, and it is both accurate and complete.

## 2023-03-16 NOTE — PROGRESS NOTES
Venipuncture performed on Left Arm by Karol Maxwell MA  with good hemostasis. Patient tolerated well. 03/16/23

## 2023-03-17 ENCOUNTER — TELEPHONE (OUTPATIENT)
Dept: FAMILY MEDICINE CLINIC | Facility: CLINIC | Age: 68
End: 2023-03-17
Payer: MEDICARE

## 2023-03-17 DIAGNOSIS — D72.829 LEUKOCYTOSIS, UNSPECIFIED TYPE: ICD-10-CM

## 2023-03-17 DIAGNOSIS — R23.2 HOT FLASHES: Primary | ICD-10-CM

## 2023-03-17 NOTE — PROGRESS NOTES
White blood cell count is again slightly elevated so lets have her follow-up with hematology oncology referral placed to follow-up on the hot flashes blood sugar was 118 so watch carbohydrates and increase your walking kidney function was also slightly decreased so avoid ibuprofen Aleve Motrin and limit x-ray dyes.  Vitamin B12 was slightly decreased so you could take at 1000 units 1 day a week call if any other questions or concerns.

## 2023-03-17 NOTE — TELEPHONE ENCOUNTER
HUB TO READ:  ----- Message from Ban Alexander MD sent at 3/17/2023  7:36 AM EDT -----  White blood cell count is again slightly elevated so lets have her follow-up with hematology oncology referral placed to follow-up on the hot flashes blood sugar was 118 so watch carbohydrates and increase your walking kidney function was also slightly decreased so avoid ibuprofen Aleve Motrin and limit x-ray dyes.  Vitamin B12 was slightly decreased so you could take at 1000 units 1 day a week call if any other questions or concerns.

## 2023-04-14 NOTE — PROGRESS NOTES
Hematology/Oncology Outpatient Consultation    Patient name: Sherrie Chand  : 1955  MRN: [id number]  Primary Care Physician: Ban Alexander MD  Referring Physician: Ban Alexander MD  Reason For Consult:     Chief Complaint   Patient presents with   • Appointment     Leukocytosis       History of Present Illness:    This is a 68-year-old female who was referred secondary to leukocytosis.  Patient denies any significant symptoms.  Review of her CBCs suggest that she has had a white count that ranges between 7-13,800 normal hemoglobin between 12 and 13 and normal platelet counts.  Her differentials have been mostly neutrophilia.  There were no immature forms noted.    Patient complains of fatigue for the past 3 to 4 months.  She sleeps well.  She denies weight loss.  She has normal appetite.  She has occasional night sweats.    Patient quit  has quit smoking  She does not drink alcohol  She is up todate on mammmogram and  Colonoscopy  Patient had CT scan of the abdomen and pelvis 2022 which showed kidney cysts otherwise she has a normal spleen size.    Family history of lung cancer in her father at age early 60s    Patient is  with 3 sons      Retired  Medical transriptionist      Past Medical History:   Diagnosis Date   • Arthritis    • Depression    • Low back pain    • Osteoporosis    • Spinal stenosis 2017       Past Surgical History:   Procedure Laterality Date   • ANTERIOR CERVICAL DISCECTOMY      C3-C4/Ant. plating C3-C6 for non-union-abstracted from [facility]   • APPENDECTOMY     • BACK SURGERY  2017    Interior and posterior-Abstracted from [facility]   • CERVICAL CORPECTOMY      C5   • MEDIASTINOTOMY FOR EXPLORATION / DRAINAGE / BIOPSY / REMOVAL FB W/ CERVICAL APPROACH  2011    Removal of cervical plate-Abstracted from [facility]   • REPLACEMENT TOTAL KNEE Left 2022    at [facility]   • VAGINAL HYSTERECTOMY           Current Outpatient Medications:   •  atorvastatin (LIPITOR)  40 MG tablet, TAKE 1 TABLET EVERY NIGHT, Disp: 90 tablet, Rfl: 3  •  cyclobenzaprine (FLEXERIL) 10 MG tablet, TAKE 1 TABLET THREE TIMES DAILY AS NEEDED FOR MUSCLE SPASM(S), Disp: 90 tablet, Rfl: 11  •  DULoxetine (CYMBALTA) 60 MG capsule, TAKE 1 CAPSULE EVERY DAY, Disp: 90 capsule, Rfl: 1  •  gabapentin (NEURONTIN) 800 MG tablet, TAKE 1 TABLET BY MOUTH THREE TIMES A DAY, Disp: 90 tablet, Rfl: 11  •  HYDROcodone-acetaminophen (NORCO) 7.5-325 MG per tablet, , Disp: , Rfl:   •  HYDROcodone-acetaminophen (NORCO) 7.5-325 MG per tablet, Take 1 tablet by mouth 3 (Three) Times a Day As Needed for Severe Pain., Disp: 90 tablet, Rfl: 0  •  lisinopril-hydrochlorothiazide (PRINZIDE,ZESTORETIC) 20-25 MG per tablet, TAKE 1 TABLET EVERY DAY, Disp: 90 tablet, Rfl: 1  •  Misc. Devices (Bariatric Rollator) misc, 1 each Daily. Indications: back pain and needs seat to rest forwalking., Disp: 1 each, Rfl: 0  •  St Johns Wort 150 MG capsule, Take 1 capsule by mouth Daily., Disp: , Rfl:   •  sulindac (CLINORIL) 200 MG tablet, Take 1 tablet by mouth 2 (Two) Times a Day., Disp: 180 tablet, Rfl: 0  •  vitamin B-6 (PYRIDOXINE) 100 MG tablet, Take 3 tablets by mouth Daily., Disp: , Rfl:     Allergies   Allergen Reactions   • Carisoprodol Shortness Of Breath     Respiratory, hives (SOMA)       Immunization History   Administered Date(s) Administered   • COVID-19 (MODERNA) 1st, 2nd, 3rd Dose Only 03/02/2021, 04/01/2021, 04/01/2021, 04/24/2021, 11/29/2021   • COVID-19 (MODERNA) BIVALENT BOOSTER 12+YRS 09/16/2022   • Flu Vaccine Intradermal Quad 18-64YR 11/29/2018   • Flu Vaccine Quad PF 6-35MO 10/27/2016, 10/27/2017   • FluLaval/Fluzone >6mos 10/19/2019, 09/15/2020   • Fluad Quad 65+ 10/01/2022   • Fluzone Quad >6mos (Multi-dose) 11/29/2018   • Influenza, Unspecified 11/29/2018   • Pneumococcal Conjugate 13-Valent (PCV13) 10/27/2016, 10/27/2016   • Pneumococcal Polysaccharide (PPSV23) 03/12/2019, 03/12/2019       Family History   Problem Relation  "Age of Onset   • Arthritis Mother    • Arthritis Father    • Lung cancer Father    • Other Father         Other cancer   • Diabetes Father    • Arthritis Maternal Grandmother    • Lung cancer Other        Cancer-related family history includes Lung cancer in her father and another family member.    Social History     Tobacco Use   • Smoking status: Former     Packs/day: 0.50     Years: 30.00     Pack years: 15.00     Types: Cigarettes     Quit date:      Years since quittin.2   • Smokeless tobacco: Never   Vaping Use   • Vaping Use: Former   Substance Use Topics   • Alcohol use: No   • Drug use: No       ROS:    Review of Systems   Constitutional: Positive for fatigue. Negative for chills and fever.   HENT: Negative for congestion, drooling, ear discharge, rhinorrhea, sinus pressure and tinnitus.    Eyes: Negative for photophobia, pain and discharge.   Respiratory: Negative for apnea, choking and stridor.    Cardiovascular: Negative for palpitations.   Gastrointestinal: Negative for abdominal distention, abdominal pain and anal bleeding.   Endocrine: Negative for polydipsia and polyphagia.   Genitourinary: Negative for decreased urine volume, flank pain and genital sores.   Musculoskeletal: Negative for gait problem, neck pain and neck stiffness.   Skin: Negative for color change, rash and wound.   Neurological: Negative for tremors, seizures, syncope, facial asymmetry and speech difficulty.   Hematological: Negative for adenopathy.   Psychiatric/Behavioral: Negative for agitation, confusion, hallucinations and self-injury. The patient is not hyperactive.        Objective:    Vitals:    23 1021   BP: 107/70   Pulse: 87   Resp: 18   Temp: 96.2 °F (35.7 °C)   TempSrc: Infrared   Weight: 109 kg (241 lb)   Height: 160 cm (63\")   PainSc: 0-No pain     Body mass index is 42.69 kg/m².       ECOG    (0) Fully active, able to carry on all predisease performance without restriction    Physical Exam:  Physical " Exam  Vitals and nursing note reviewed.   Constitutional:       General: She is not in acute distress.     Appearance: She is not diaphoretic.   HENT:      Head: Normocephalic and atraumatic.   Eyes:      General: No scleral icterus.        Right eye: No discharge.         Left eye: No discharge.      Conjunctiva/sclera: Conjunctivae normal.   Neck:      Thyroid: No thyromegaly.   Cardiovascular:      Rate and Rhythm: Normal rate and regular rhythm.      Heart sounds: Normal heart sounds.     No friction rub. No gallop.   Pulmonary:      Effort: Pulmonary effort is normal. No respiratory distress.      Breath sounds: No stridor. No wheezing.   Abdominal:      General: Bowel sounds are normal.      Palpations: Abdomen is soft. There is no mass.      Tenderness: There is no abdominal tenderness. There is no guarding or rebound.   Musculoskeletal:         General: No tenderness. Normal range of motion.      Cervical back: Normal range of motion and neck supple.   Lymphadenopathy:      Cervical: No cervical adenopathy.   Skin:     General: Skin is warm.      Findings: No erythema or rash.   Neurological:      Mental Status: She is alert and oriented to person, place, and time.      Motor: No abnormal muscle tone.   Psychiatric:         Behavior: Behavior normal.         RECENT LABS  WBC   Date Value Ref Range Status   04/17/2023 12.98 (H) 3.40 - 10.80 10*3/mm3 Final     RBC   Date Value Ref Range Status   04/17/2023 4.19 3.77 - 5.28 10*6/mm3 Final     Hemoglobin   Date Value Ref Range Status   04/17/2023 13.1 12.0 - 15.9 g/dL Final     Hematocrit   Date Value Ref Range Status   04/17/2023 39.9 34.0 - 46.6 % Final     MCV   Date Value Ref Range Status   04/17/2023 95.2 79.0 - 97.0 fL Final     MCH   Date Value Ref Range Status   04/17/2023 31.3 26.6 - 33.0 pg Final     MCHC   Date Value Ref Range Status   04/17/2023 32.8 31.5 - 35.7 g/dL Final     RDW   Date Value Ref Range Status   04/17/2023 14.8 12.3 - 15.4 % Final      RDW-SD   Date Value Ref Range Status   04/17/2023 49.4 37.0 - 54.0 fl Final     MPV   Date Value Ref Range Status   04/17/2023 10.9 6.0 - 12.0 fL Final     Platelets   Date Value Ref Range Status   04/17/2023 268 140 - 450 10*3/mm3 Final     Neutrophil %   Date Value Ref Range Status   04/17/2023 75.8 42.7 - 76.0 % Final     Lymphocyte %   Date Value Ref Range Status   04/17/2023 15.3 (L) 19.6 - 45.3 % Final     Monocyte %   Date Value Ref Range Status   04/17/2023 7.9 5.0 - 12.0 % Final     Eosinophil %   Date Value Ref Range Status   04/17/2023 0.7 0.3 - 6.2 % Final     Basophil %   Date Value Ref Range Status   04/17/2023 0.3 0.0 - 1.5 % Final     Immature Grans %   Date Value Ref Range Status   03/16/2023 0.8 (H) 0.0 - 0.5 % Final     Neutrophils, Absolute   Date Value Ref Range Status   04/17/2023 9.84 (H) 1.70 - 7.00 10*3/mm3 Final     Lymphocytes, Absolute   Date Value Ref Range Status   04/17/2023 1.99 0.70 - 3.10 10*3/mm3 Final     Monocytes, Absolute   Date Value Ref Range Status   04/17/2023 1.02 (H) 0.10 - 0.90 10*3/mm3 Final     Eosinophils, Absolute   Date Value Ref Range Status   04/17/2023 0.09 0.00 - 0.40 10*3/mm3 Final     Basophils, Absolute   Date Value Ref Range Status   04/17/2023 0.04 0.00 - 0.20 10*3/mm3 Final     Immature Grans, Absolute   Date Value Ref Range Status   03/16/2023 0.11 (H) 0.00 - 0.05 10*3/mm3 Final     nRBC   Date Value Ref Range Status   03/16/2023 0.0 0.0 - 0.2 /100 WBC Final       Lab Results   Component Value Date    GLUCOSE 118 (H) 03/16/2023    BUN 17 03/16/2023    CREATININE 1.09 (H) 03/16/2023    EGFRIFNONA 55 (L) 01/05/2022    BCR 15.6 03/16/2023    K 4.2 03/16/2023    CO2 28.8 03/16/2023    CALCIUM 10.3 03/16/2023    ALBUMIN 4.3 03/16/2023    LABIL2 1.4 01/14/2019    AST 16 03/16/2023    ALT 26 03/16/2023         Assessment & Plan   Leukocytosis, unspecified type  - CBC & Differential      1. Leukocytosis chronic most likely reactive.  Rule out  myeloproliferative/lymphoproliferative disease  2. CT scan abdomen and pelvis November 22 shows a normal spleen size  3. Chronic arthritic symptoms likely contributing to chronic leukocytosis      Plans    · Leukocytosis work-up  · Make a peripheral smear  · Follow-up 3 to 4 weeks review results    Patient verbalized understanding and is in agreement of the above plan.              I spent 45 total minutes, face-to-face, caring for Sherrie today.  90% of this time involved counseling and/or coordination of care as documented within this note.

## 2023-04-17 ENCOUNTER — CONSULT (OUTPATIENT)
Dept: ONCOLOGY | Facility: CLINIC | Age: 68
End: 2023-04-17
Payer: MEDICARE

## 2023-04-17 ENCOUNTER — LAB (OUTPATIENT)
Dept: LAB | Facility: HOSPITAL | Age: 68
End: 2023-04-17
Payer: MEDICARE

## 2023-04-17 VITALS
HEART RATE: 87 BPM | WEIGHT: 241 LBS | SYSTOLIC BLOOD PRESSURE: 107 MMHG | BODY MASS INDEX: 42.7 KG/M2 | RESPIRATION RATE: 18 BRPM | DIASTOLIC BLOOD PRESSURE: 70 MMHG | TEMPERATURE: 96.2 F | HEIGHT: 63 IN

## 2023-04-17 DIAGNOSIS — D72.829 LEUKOCYTOSIS, UNSPECIFIED TYPE: Primary | ICD-10-CM

## 2023-04-17 DIAGNOSIS — D72.829 LEUKOCYTOSIS, UNSPECIFIED TYPE: ICD-10-CM

## 2023-04-17 LAB
ALBUMIN SERPL-MCNC: 4.4 G/DL (ref 3.5–5.2)
ALBUMIN/GLOB SERPL: 1.9 G/DL
ALP SERPL-CCNC: 85 U/L (ref 39–117)
ALT SERPL W P-5'-P-CCNC: 22 U/L (ref 1–33)
ANION GAP SERPL CALCULATED.3IONS-SCNC: 11 MMOL/L (ref 5–15)
AST SERPL-CCNC: 16 U/L (ref 1–32)
BASOPHILS # BLD AUTO: 0.04 10*3/MM3 (ref 0–0.2)
BASOPHILS NFR BLD AUTO: 0.3 % (ref 0–1.5)
BILIRUB SERPL-MCNC: 0.2 MG/DL (ref 0–1.2)
BUN SERPL-MCNC: 15 MG/DL (ref 8–23)
BUN/CREAT SERPL: 15.5 (ref 7–25)
CALCIUM SPEC-SCNC: 9.8 MG/DL (ref 8.6–10.5)
CHLORIDE SERPL-SCNC: 101 MMOL/L (ref 98–107)
CO2 SERPL-SCNC: 28 MMOL/L (ref 22–29)
CREAT SERPL-MCNC: 0.97 MG/DL (ref 0.57–1)
DEPRECATED RDW RBC AUTO: 49.4 FL (ref 37–54)
EGFRCR SERPLBLD CKD-EPI 2021: 64.6 ML/MIN/1.73
EOSINOPHIL # BLD AUTO: 0.09 10*3/MM3 (ref 0–0.4)
EOSINOPHIL NFR BLD AUTO: 0.7 % (ref 0.3–6.2)
ERYTHROCYTE [DISTWIDTH] IN BLOOD BY AUTOMATED COUNT: 14.8 % (ref 12.3–15.4)
ERYTHROCYTE [SEDIMENTATION RATE] IN BLOOD: 8 MM/HR (ref 0–30)
GLOBULIN UR ELPH-MCNC: 2.3 GM/DL
GLUCOSE SERPL-MCNC: 109 MG/DL (ref 65–99)
HCT VFR BLD AUTO: 39.9 % (ref 34–46.6)
HGB BLD-MCNC: 13.1 G/DL (ref 12–15.9)
LDH SERPL-CCNC: 173 U/L (ref 135–214)
LYMPHOCYTES # BLD AUTO: 1.99 10*3/MM3 (ref 0.7–3.1)
LYMPHOCYTES NFR BLD AUTO: 15.3 % (ref 19.6–45.3)
MCH RBC QN AUTO: 31.3 PG (ref 26.6–33)
MCHC RBC AUTO-ENTMCNC: 32.8 G/DL (ref 31.5–35.7)
MCV RBC AUTO: 95.2 FL (ref 79–97)
MONOCYTES # BLD AUTO: 1.02 10*3/MM3 (ref 0.1–0.9)
MONOCYTES NFR BLD AUTO: 7.9 % (ref 5–12)
NEUTROPHILS NFR BLD AUTO: 75.8 % (ref 42.7–76)
NEUTROPHILS NFR BLD AUTO: 9.84 10*3/MM3 (ref 1.7–7)
PLATELET # BLD AUTO: 268 10*3/MM3 (ref 140–450)
PMV BLD AUTO: 10.9 FL (ref 6–12)
POTASSIUM SERPL-SCNC: 4.1 MMOL/L (ref 3.5–5.2)
PROT SERPL-MCNC: 6.7 G/DL (ref 6–8.5)
RBC # BLD AUTO: 4.19 10*6/MM3 (ref 3.77–5.28)
SODIUM SERPL-SCNC: 140 MMOL/L (ref 136–145)
URATE SERPL-MCNC: 6.9 MG/DL (ref 2.4–5.7)
VIT B12 BLD-MCNC: 505 PG/ML (ref 211–946)
WBC NRBC COR # BLD: 12.98 10*3/MM3 (ref 3.4–10.8)

## 2023-04-17 PROCEDURE — 80053 COMPREHEN METABOLIC PANEL: CPT | Performed by: INTERNAL MEDICINE

## 2023-04-17 PROCEDURE — 84550 ASSAY OF BLOOD/URIC ACID: CPT | Performed by: INTERNAL MEDICINE

## 2023-04-17 PROCEDURE — 85652 RBC SED RATE AUTOMATED: CPT | Performed by: INTERNAL MEDICINE

## 2023-04-17 PROCEDURE — 85025 COMPLETE CBC W/AUTO DIFF WBC: CPT

## 2023-04-17 PROCEDURE — 36415 COLL VENOUS BLD VENIPUNCTURE: CPT | Performed by: INTERNAL MEDICINE

## 2023-04-17 PROCEDURE — 86038 ANTINUCLEAR ANTIBODIES: CPT | Performed by: INTERNAL MEDICINE

## 2023-04-17 PROCEDURE — 82607 VITAMIN B-12: CPT | Performed by: INTERNAL MEDICINE

## 2023-04-17 PROCEDURE — 83615 LACTATE (LD) (LDH) ENZYME: CPT | Performed by: INTERNAL MEDICINE

## 2023-04-18 ENCOUNTER — TELEPHONE (OUTPATIENT)
Dept: FAMILY MEDICINE CLINIC | Facility: CLINIC | Age: 68
End: 2023-04-18
Payer: MEDICARE

## 2023-04-18 ENCOUNTER — PATIENT ROUNDING (BHMG ONLY) (OUTPATIENT)
Dept: ONCOLOGY | Facility: CLINIC | Age: 68
End: 2023-04-18
Payer: MEDICARE

## 2023-04-18 PROBLEM — E04.2 MULTIPLE THYROID NODULES: Status: RESOLVED | Noted: 2021-03-09 | Resolved: 2023-04-18

## 2023-04-18 NOTE — PROGRESS NOTES
April 18, 2023    Hello, may I speak with Sherrie Chand?    My name is Christina Contreras      I am  with [facility] [address] [place] [address] [phone].    Before we get started may I verify your date of birth? 1955    I am calling to officially welcome you to our practice and ask about your recent visit. Is this a good time to talk? no    Tell me about your visit with us. What things went well?  A My Chart message was sent to the patient.       We're always looking for ways to make our patients' experiences even better. Do you have recommendations on ways we may improve?  no    Overall were you satisfied with your first visit to our practice? yes       I appreciate you taking the time to speak with me today. Is there anything else I can do for you? no      Thank you, and have a great day.

## 2023-04-18 NOTE — TELEPHONE ENCOUNTER
HUB TO READ:  ----- Message from Ban Alexander MD sent at 4/18/2023  1:14 PM EDT -----  Nodules felt to be benign-not needing F/U

## 2023-04-19 LAB — ANA SER QL: NEGATIVE

## 2023-04-20 ENCOUNTER — OFFICE VISIT (OUTPATIENT)
Dept: PAIN MEDICINE | Facility: CLINIC | Age: 68
End: 2023-04-20
Payer: MEDICARE

## 2023-04-20 ENCOUNTER — TELEPHONE (OUTPATIENT)
Dept: PAIN MEDICINE | Facility: CLINIC | Age: 68
End: 2023-04-20

## 2023-04-20 VITALS
SYSTOLIC BLOOD PRESSURE: 117 MMHG | RESPIRATION RATE: 16 BRPM | HEART RATE: 88 BPM | DIASTOLIC BLOOD PRESSURE: 68 MMHG | OXYGEN SATURATION: 96 %

## 2023-04-20 DIAGNOSIS — G89.4 CHRONIC PAIN SYNDROME: ICD-10-CM

## 2023-04-20 DIAGNOSIS — M54.16 LUMBAR RADICULITIS: ICD-10-CM

## 2023-04-20 DIAGNOSIS — M25.50 POLYARTHRALGIA: ICD-10-CM

## 2023-04-20 DIAGNOSIS — Z79.899 HIGH RISK MEDICATION USE: Primary | ICD-10-CM

## 2023-04-20 DIAGNOSIS — M96.1 POSTLAMINECTOMY SYNDROME OF LUMBAR REGION: Primary | ICD-10-CM

## 2023-04-20 DIAGNOSIS — Z79.899 HIGH RISK MEDICATION USE: ICD-10-CM

## 2023-04-20 LAB
JAK2 P.V617F BLD/T QL: NORMAL
LAB DIRECTOR NAME PROVIDER: NORMAL
LABORATORY COMMENT REPORT: NORMAL
METHYLMALONATE SERPL-SCNC: 205 NMOL/L (ref 0–378)

## 2023-04-20 RX ORDER — HYDROCODONE BITARTRATE AND ACETAMINOPHEN 7.5; 325 MG/1; MG/1
1 TABLET ORAL 3 TIMES DAILY PRN
Qty: 90 TABLET | Refills: 0 | Status: SHIPPED | OUTPATIENT
Start: 2023-05-20

## 2023-04-20 RX ORDER — HYDROCODONE BITARTRATE AND ACETAMINOPHEN 7.5; 325 MG/1; MG/1
1 TABLET ORAL 3 TIMES DAILY PRN
Qty: 90 TABLET | Refills: 0 | Status: SHIPPED | OUTPATIENT
Start: 2023-04-20

## 2023-04-20 RX ORDER — HYDROCODONE BITARTRATE AND ACETAMINOPHEN 7.5; 325 MG/1; MG/1
1 TABLET ORAL 3 TIMES DAILY PRN
Qty: 90 TABLET | Refills: 0 | Status: SHIPPED | OUTPATIENT
Start: 2023-05-20 | End: 2023-04-20 | Stop reason: SDUPTHER

## 2023-04-20 RX ORDER — HYDROCODONE BITARTRATE AND ACETAMINOPHEN 7.5; 325 MG/1; MG/1
1 TABLET ORAL 3 TIMES DAILY PRN
Qty: 90 TABLET | Refills: 0 | Status: SHIPPED | OUTPATIENT
Start: 2023-04-20 | End: 2023-04-20 | Stop reason: SDUPTHER

## 2023-04-20 NOTE — PROGRESS NOTES
CC  hip/buttock, right leg, lower back pain  68-year-old female with chronic back pain/postlaminectomy syndrome history of fusion L2 to to S1, polyarthralgia, S/P left TKA, here for follow-up.  Diagnosed with leukocytosis.  Seen heme-onc, believed to be reactive due to polyarthralgia/inflammatory process.  Work-up pending.  She denies any new complaints today.  Continued back pain radiating to both legs.  Declines injection at this time.  Good relief of hydrocodone denies side effects.  Chronic back pain radiating to bilateral lower extremity worse on the left with aching numbness in both legs worse with standing walking or activity.  Denies new weakness saddle anesthesia bladder bowel incontinence.    Pain interfering with daily activity and sleep.  Marginal relief with physical therapy or anti-inflammatories.  Marginal relief with caudal JESSICA in the past.  Declines LESI due to past experience with post dural puncture headache.  Mild relief with transforaminal JESSICA.      Utilizes hydrocodone with good relief functional benefit and side effects.      CT abdomen pelvis : L2-S1 posterior spinal fusion hardware appears intact. L4 lobectomy with interbody strut is in place. Advanced degenerative disc endplate changes  are present at the L1-2 level. No acute osseous abnormalities are identified.  L-spine MRI 2016: Multilevel degenerative disc disease and degenerative facet change as well as  multilevel postoperative changes detailed above.  There is a 14 mm of anterolisthesis of L4 on L5 which has worsened from the prior exam where this was only 4 mm.  This results in severe bilateral neural foraminal narrowing but no spinal canal stenosis.    Pain Assessment   Cause of Pain: surgery  Location of Pain: Lower Back, R Hip, L Hip, R Leg, L Leg  Description of Pain: Dull/Aching, Throbbing, Stabbing  Previous Pain Rating : 7  Current Pain Ratin  Aggravating Factors: Activity  Alleviating Factors: Rest,  Medication  Previous Treatments: Epidural Steroids, Narcotic Pain Medication, Physical Therapy  Previous Diagnostic Studies: MRI   PEG Assessment   What number best describes your pain on average in the past week?7  What number best describes how, during the past week, pain has interfered with your enjoyment of life?4  What number best describes how, during the past week, pain has interfered with your general activity? 9     has a past medical history of Arthritis, Depression, Low back pain, Osteoporosis, and Spinal stenosis (2017).     has a past surgical history that includes Cervical Curettage; Anterior cervical discectomy; Total vaginal hysterectomy; Appendectomy; Back surgery (); Mediastinotomy for exploration / drainage / biopsy / removal Fb w/ cervical approach (2011); and Replacement total knee (Left, 2022).     Social History     Tobacco Use   • Smoking status: Former     Packs/day: 0.50     Years: 30.00     Pack years: 15.00     Types: Cigarettes     Quit date:      Years since quittin.3   • Smokeless tobacco: Never   Substance Use Topics   • Alcohol use: No     Review of Systems        See HPI      Vitals:    23 0843   BP: 117/68   Pulse: 88   Resp: 16   SpO2: 96%     Physical Exam  Vitals reviewed.   Constitutional:       General: She is not in acute distress.  Pulmonary:      Effort: Pulmonary effort is normal.   Musculoskeletal:      Lumbar back: Tenderness present. Positive right straight leg raise test.      Comments: Lumbar loading positive, pain on extension of low back past 5 degrees.  TTP on the lumbar facets noted.       PHQ 9 on chart                     opioid risk tool low risk      Assessment /Plan   Diagnoses and all orders for this visit:    1. Postlaminectomy syndrome of lumbar region (Primary)    2. Chronic pain syndrome  -     HYDROcodone-acetaminophen (NORCO) 7.5-325 MG per tablet; Take 1 tablet by mouth 3 (Three) Times a Day As Needed for Severe Pain.  DNF before 5/20/2023  Dispense: 90 tablet; Refill: 0  -     HYDROcodone-acetaminophen (NORCO) 7.5-325 MG per tablet; Take 1 tablet by mouth 3 (Three) Times a Day As Needed for Severe Pain.  Dispense: 90 tablet; Refill: 0    3. Lumbar radiculitis    4. Polyarthralgia    5. High risk medication use    Summary   68-year-old female with chronic back pain here for follow-up.    chronic pain from lumbar DDD spondylosis with radicular pain.  Chronic polyarthralgia.  Repeat right L4-L5 transforaminal JESSICA or SI injection as needed.    Diagnosed with leukocytosis.  Seen heme-onc, believed to be reactive due to polyarthralgia/inflammatory process.  Work-up pending.  She denies any new complaints today.  Continued back pain radiating to both legs.  Declines injection at this time.  Good relief of hydrocodone denies side effects.    Continue hydrocodone 7.5/25 3 times daily.  UDS and inspect reviewed.  Discussed risk of tolerance, dependence, respiratory depression, coma and death associated with use of oral opioids for treatment of chronic nonmalignant pain.      Continue gabapentin/flexeril.  Cont. topical compounded neuropathic/anti-inflammatory cream with ketamine.      RTC 2 mo

## 2023-04-20 NOTE — TELEPHONE ENCOUNTER
Provider: dr. hope    Caller: PATIENT    Relationship to Patient: SELF    Pharmacy: [facility]- [phone]    Phone Number: [phone]    Reason for Call: PT. WAS SEEN THIS MORNING.   STATES THAT HER REGULAR [facility] IS OUT OF HYDROCODONE.   SHE IS ASKING IF THIS CAN PLEASE BE SENT TO [facility] IN [place].   PLEASE CALL TO ADVISE.   SHE WILL RUN OUT TONIGHT.

## 2023-04-22 RX ORDER — LISINOPRIL AND HYDROCHLOROTHIAZIDE 25; 20 MG/1; MG/1
TABLET ORAL
Qty: 90 TABLET | Refills: 1 | Status: SHIPPED | OUTPATIENT
Start: 2023-04-22

## 2023-04-24 LAB
CELLS ANALYZED: 200
CELLS COUNTED: 200
CHROM ANALY RESULT (ISCN): NORMAL
CLINICAL CYTOGENETICIST SPEC: NORMAL
DIAGNOSTIC IMP SPEC-IMP: NORMAL
SPECIMEN SOURCE: NORMAL

## 2023-04-25 ENCOUNTER — TELEPHONE (OUTPATIENT)
Dept: ONCOLOGY | Facility: CLINIC | Age: 68
End: 2023-04-25
Payer: MEDICARE

## 2023-04-25 NOTE — TELEPHONE ENCOUNTER
----- Message from LEW Cruz sent at 4/24/2023  8:12 AM EDT -----  Please let the patient know he uric acid level was mildly elevated.  Sometimes this can indicate a condition called gout.  Please assess if she has a swollen, red, painful joint-especially the big toe.  If no symptoms she should just f/u with her PCP and we can fax this level to them if they are not in our system.  Ty!

## 2023-04-26 ENCOUNTER — TELEPHONE (OUTPATIENT)
Dept: ONCOLOGY | Facility: CLINIC | Age: 68
End: 2023-04-26
Payer: MEDICARE

## 2023-05-04 ENCOUNTER — TELEPHONE (OUTPATIENT)
Dept: FAMILY MEDICINE CLINIC | Facility: CLINIC | Age: 68
End: 2023-05-04
Payer: MEDICARE

## 2023-05-18 RX ORDER — ATORVASTATIN CALCIUM 40 MG/1
TABLET, FILM COATED ORAL
Qty: 90 TABLET | Refills: 0 | Status: SHIPPED | OUTPATIENT
Start: 2023-05-18 | End: 2023-05-22 | Stop reason: SDUPTHER

## 2023-05-18 RX ORDER — SULINDAC 200 MG/1
TABLET ORAL
Qty: 180 TABLET | Refills: 0 | Status: SHIPPED | OUTPATIENT
Start: 2023-05-18

## 2023-05-19 RX ORDER — ATORVASTATIN CALCIUM 40 MG/1
40 TABLET, FILM COATED ORAL NIGHTLY
Qty: 90 TABLET | Refills: 0 | OUTPATIENT
Start: 2023-05-19

## 2023-05-19 NOTE — TELEPHONE ENCOUNTER
Rx Refill Note  Requested Prescriptions     Pending Prescriptions Disp Refills   • atorvastatin (LIPITOR) 40 MG tablet 90 tablet 0     Sig: Take 1 tablet by mouth Every Night.      Last office visit with prescribing clinician: 3/16/2023   Last telemedicine visit with prescribing clinician: 5/18/2023   Next office visit with prescribing clinician: 6/29/2023                         Would you like a call back once the refill request has been completed: [] Yes [] No    If the office needs to give you a call back, can they leave a voicemail: [] Yes [] No    Alena Reyna MA  05/19/23, 11:52 EDT

## 2023-05-22 RX ORDER — ATORVASTATIN CALCIUM 40 MG/1
40 TABLET, FILM COATED ORAL NIGHTLY
Qty: 90 TABLET | Refills: 0 | Status: SHIPPED | OUTPATIENT
Start: 2023-05-22

## 2023-05-22 NOTE — TELEPHONE ENCOUNTER
Rx Refill Note  Requested Prescriptions     Pending Prescriptions Disp Refills   • atorvastatin (LIPITOR) 40 MG tablet 90 tablet 0     Sig: Take 1 tablet by mouth Every Night.      Last office visit with prescribing clinician: 3/16/2023   Last telemedicine visit with prescribing clinician: 5/19/2023   Next office visit with prescribing clinician: 6/29/2023                         Would you like a call back once the refill request has been completed: [] Yes [] No    If the office needs to give you a call back, can they leave a voicemail: [] Yes [] No    Alena Reyna MA  05/22/23, 11:47 EDT

## 2023-06-29 PROBLEM — Z00.01 ENCOUNTER FOR ANNUAL GENERAL MEDICAL EXAMINATION WITH ABNORMAL FINDINGS IN ADULT: Status: ACTIVE | Noted: 2023-06-29

## 2023-08-07 DIAGNOSIS — G89.4 CHRONIC PAIN SYNDROME: ICD-10-CM

## 2023-08-07 RX ORDER — GABAPENTIN 800 MG/1
800 TABLET ORAL 3 TIMES DAILY
Qty: 270 TABLET | Refills: 3 | Status: SHIPPED | OUTPATIENT
Start: 2023-08-07

## 2023-08-07 RX ORDER — SULINDAC 200 MG/1
200 TABLET ORAL 2 TIMES DAILY
Qty: 180 TABLET | Refills: 0 | Status: SHIPPED | OUTPATIENT
Start: 2023-08-07

## 2023-08-07 RX ORDER — LISINOPRIL AND HYDROCHLOROTHIAZIDE 25; 20 MG/1; MG/1
1 TABLET ORAL DAILY
Qty: 90 TABLET | Refills: 1 | Status: SHIPPED | OUTPATIENT
Start: 2023-08-07

## 2023-08-07 RX ORDER — DULOXETIN HYDROCHLORIDE 60 MG/1
60 CAPSULE, DELAYED RELEASE ORAL DAILY
Qty: 90 CAPSULE | Refills: 1 | Status: SHIPPED | OUTPATIENT
Start: 2023-08-07

## 2023-08-07 NOTE — TELEPHONE ENCOUNTER
Rx Refill Note  Requested Prescriptions     Pending Prescriptions Disp Refills    gabapentin (NEURONTIN) 800 MG tablet 90 tablet 11     Sig: Take 1 tablet by mouth 3 (Three) Times a Day.    sulindac (CLINORIL) 200 MG tablet 180 tablet 0     Sig: Take 1 tablet by mouth 2 (Two) Times a Day.    lisinopril-hydrochlorothiazide (PRINZIDE,ZESTORETIC) 20-25 MG per tablet 90 tablet 1     Sig: Take 1 tablet by mouth Daily.    DULoxetine (CYMBALTA) 60 MG capsule 90 capsule 1     Sig: Take 1 capsule by mouth Daily.      Last office visit with prescribing clinician: 6/29/2023   Last telemedicine visit with prescribing clinician: Visit date not found   Next office visit with prescribing clinician: 12/29/2023                         Would you like a call back once the refill request has been completed: [] Yes [] No    If the office needs to give you a call back, can they leave a voicemail: [] Yes [] No    Karol Maxwell MA  08/07/23, 13:50 EDT
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
## 2023-08-16 RX ORDER — CYCLOBENZAPRINE HCL 10 MG
TABLET ORAL
Qty: 90 TABLET | Refills: 11 | Status: SHIPPED | OUTPATIENT
Start: 2023-08-16

## 2023-08-17 RX ORDER — DULOXETIN HYDROCHLORIDE 60 MG/1
60 CAPSULE, DELAYED RELEASE ORAL DAILY
Qty: 90 CAPSULE | Refills: 1 | Status: SHIPPED | OUTPATIENT
Start: 2023-08-17

## 2023-08-17 RX ORDER — SULINDAC 200 MG/1
200 TABLET ORAL 2 TIMES DAILY
Qty: 180 TABLET | Refills: 0 | Status: SHIPPED | OUTPATIENT
Start: 2023-08-17

## 2023-08-17 RX ORDER — SULINDAC 200 MG/1
TABLET ORAL
Qty: 180 TABLET | Refills: 0 | Status: SHIPPED | OUTPATIENT
Start: 2023-08-17 | End: 2023-08-17 | Stop reason: SDUPTHER

## 2023-08-17 NOTE — TELEPHONE ENCOUNTER
Rx Refill Note  Requested Prescriptions     Pending Prescriptions Disp Refills    DULoxetine (CYMBALTA) 60 MG capsule 90 capsule 1     Sig: Take 1 capsule by mouth Daily.    sulindac (CLINORIL) 200 MG tablet 180 tablet 0     Sig: Take 1 tablet by mouth 2 (Two) Times a Day.      Last office visit with prescribing clinician: 6/29/2023   Last telemedicine visit with prescribing clinician: Visit date not found   Next office visit with prescribing clinician: 12/29/2023                         Would you like a call back once the refill request has been completed: [] Yes [] No    If the office needs to give you a call back, can they leave a voicemail: [] Yes [] No    Alena Reyna MA  08/17/23, 09:50 EDT

## 2023-08-17 NOTE — TELEPHONE ENCOUNTER
Rx Refill Note  Requested Prescriptions     Pending Prescriptions Disp Refills    DULoxetine (CYMBALTA) 60 MG capsule 90 capsule 1     Sig: Take 1 capsule by mouth Daily.    sulindac (CLINORIL) 200 MG tablet 180 tablet 0     Sig: Take 1 tablet by mouth 2 (Two) Times a Day.      Last office visit with prescribing clinician: 6/29/2023   Last telemedicine visit with prescribing clinician: Visit date not found   Next office visit with prescribing clinician: 12/29/2023                         Would you like a call back once the refill request has been completed: [] Yes [] No    If the office needs to give you a call back, can they leave a voicemail: [] Yes [] No    Alena Reyna MA  08/17/23, 09:51 EDT

## 2023-09-19 ENCOUNTER — OFFICE VISIT (OUTPATIENT)
Dept: PAIN MEDICINE | Facility: CLINIC | Age: 68
End: 2023-09-19
Payer: MEDICARE

## 2023-09-19 VITALS
HEART RATE: 73 BPM | DIASTOLIC BLOOD PRESSURE: 67 MMHG | OXYGEN SATURATION: 95 % | RESPIRATION RATE: 16 BRPM | SYSTOLIC BLOOD PRESSURE: 134 MMHG

## 2023-09-19 DIAGNOSIS — M25.50 POLYARTHRALGIA: ICD-10-CM

## 2023-09-19 DIAGNOSIS — Z79.899 HIGH RISK MEDICATION USE: Primary | ICD-10-CM

## 2023-09-19 DIAGNOSIS — M54.16 LUMBAR RADICULITIS: ICD-10-CM

## 2023-09-19 DIAGNOSIS — M96.1 POSTLAMINECTOMY SYNDROME OF LUMBAR REGION: ICD-10-CM

## 2023-09-19 DIAGNOSIS — G89.4 CHRONIC PAIN SYNDROME: ICD-10-CM

## 2023-09-19 PROCEDURE — 3078F DIAST BP <80 MM HG: CPT | Performed by: ANESTHESIOLOGY

## 2023-09-19 PROCEDURE — 1125F AMNT PAIN NOTED PAIN PRSNT: CPT | Performed by: ANESTHESIOLOGY

## 2023-09-19 PROCEDURE — 3075F SYST BP GE 130 - 139MM HG: CPT | Performed by: ANESTHESIOLOGY

## 2023-09-19 PROCEDURE — 99214 OFFICE O/P EST MOD 30 MIN: CPT | Performed by: ANESTHESIOLOGY

## 2023-09-19 RX ORDER — HYDROCODONE BITARTRATE AND ACETAMINOPHEN 7.5; 325 MG/1; MG/1
1 TABLET ORAL 3 TIMES DAILY PRN
Qty: 90 TABLET | Refills: 0 | Status: SHIPPED | OUTPATIENT
Start: 2023-10-18

## 2023-09-19 RX ORDER — HYDROCODONE BITARTRATE AND ACETAMINOPHEN 7.5; 325 MG/1; MG/1
1 TABLET ORAL 3 TIMES DAILY PRN
Qty: 90 TABLET | Refills: 0 | Status: SHIPPED | OUTPATIENT
Start: 2023-11-17

## 2023-09-19 RX ORDER — HYDROCODONE BITARTRATE AND ACETAMINOPHEN 7.5; 325 MG/1; MG/1
1 TABLET ORAL 3 TIMES DAILY PRN
Qty: 90 TABLET | Refills: 0 | Status: SHIPPED | OUTPATIENT
Start: 2023-09-19

## 2023-09-19 NOTE — PROGRESS NOTES
CC  hip/buttock, right leg, lower back pain, joints pain  68-year-old female with chronic back pain/postlaminectomy syndrome history of fusion L2 to to S1, polyarthralgia, S/P left TKA, here for follow-up.  Complains of worsening right-sided back pain with right lower extremity radicular pain.  Had 60% relief with transforaminal JESSICA at L4 and L5 which lasted several months.  Now symptoms are interfering with ADL and sleep.  Continued chronic back pain radiating to bilateral lower extremity worse on the right with aching numbness in both legs worse with standing walking or activity.  Denies new weakness saddle anesthesia bladder bowel incontinence.    Pain interfering with daily activity and sleep.  Marginal relief with physical therapy or anti-inflammatories.  Marginal relief with caudal JESSICA in the past.  Declines LESI due to past experience with post dural puncture headache.  60% relief with transforaminal JESSICA.      Utilizes hydrocodone with good relief functional benefit and side effects.      CT abdomen pelvis : L2-S1 posterior spinal fusion hardware appears intact. L4 lobectomy with interbody strut is in place. Advanced degenerative disc endplate changes  are present at the L1-2 level. No acute osseous abnormalities are identified.  L-spine MRI 2016: Multilevel degenerative disc disease and degenerative facet change as well as  multilevel postoperative changes detailed above.  There is a 14 mm of anterolisthesis of L4 on L5 which has worsened from the prior exam where this was only 4 mm.  This results in severe bilateral neural foraminal narrowing but no spinal canal stenosis.    Pain Assessment   Cause of Pain: surgery  Location of Pain: Lower Back, R Hip, L Hip, R Leg, L Leg  Description of Pain: Dull/Aching, Throbbing, Stabbing  Previous Pain Rating : 7  Current Pain Ratin  Aggravating Factors: Activity  Alleviating Factors: Rest, Medication  Previous Treatments: Epidural Steroids, Narcotic Pain  Medication, Physical Therapy  Previous Diagnostic Studies: MRI   PEG Assessment   What number best describes your pain on average in the past week?7  What number best describes how, during the past week, pain has interfered with your enjoyment of life?5  What number best describes how, during the past week, pain has interfered with your general activity? 10     has a past medical history of Arthritis, Depression, Low back pain, Osteoporosis, and Spinal stenosis (2017).     has a past surgical history that includes Cervical Curettage; Anterior cervical discectomy; Total vaginal hysterectomy; Appendectomy; Back surgery (); Mediastinotomy for exploration / drainage / biopsy / removal Fb w/ cervical approach (2011); and Replacement total knee (Left, 2022).     Social History     Tobacco Use    Smoking status: Former     Packs/day: 0.50     Years: 30.00     Pack years: 15.00     Types: Cigarettes     Quit date:      Years since quittin.7    Smokeless tobacco: Never   Substance Use Topics    Alcohol use: No     Review of Systems        See HPI      Vitals:    23 0846   BP: 134/67   Pulse: 73   Resp: 16   SpO2: 95%     Physical Exam  Vitals reviewed.   Constitutional:       General: She is not in acute distress.  Pulmonary:      Effort: Pulmonary effort is normal.   Musculoskeletal:      Lumbar back: Tenderness present. Positive right straight leg raise test.      Comments: Lumbar loading positive, pain on extension of low back past 5 degrees.  TTP on the lumbar facets noted.     PHQ 9 on chart                     opioid risk tool low risk      Assessment /Plan   Diagnoses and all orders for this visit:    1. Chronic pain syndrome (Primary)  -     HYDROcodone-acetaminophen (NORCO) 7.5-325 MG per tablet; Take 1 tablet by mouth 3 (Three) Times a Day As Needed for Severe Pain. DNF before 2023  Dispense: 90 tablet; Refill: 0  -     HYDROcodone-acetaminophen (NORCO) 7.5-325 MG per tablet;  Take 1 tablet by mouth 3 (Three) Times a Day As Needed for Severe Pain. DNF before 10/18/2023  Dispense: 90 tablet; Refill: 0  -     HYDROcodone-acetaminophen (NORCO) 7.5-325 MG per tablet; Take 1 tablet by mouth 3 (Three) Times a Day As Needed for Severe Pain.  Dispense: 90 tablet; Refill: 0    2. Postlaminectomy syndrome of lumbar region    3. Lumbar radiculitis  -     Nerve Root Block    4. Polyarthralgia    5. High risk medication use    Summary   68-year-old female with chronic back pain here for follow-up.    chronic pain from lumbar DDD spondylosis with radicular pain.  Chronic polyarthralgia.  Repeat right L4-L5 transforaminal JESSICA or SI injection as needed.    Complains of worsening right-sided back pain with right lower extremity radicular pain.  Had 60% relief with transforaminal JESSICA at L4 and L5 which lasted several months.  Now symptoms are interfering with ADL and sleep.  We will schedule for repeat right L4-L5 transforaminal JESSICA.  Risks and benefits discussed.    Continue hydrocodone 7.5/25 3 times daily.  UDS and inspect reviewed.  Discussed risk of tolerance, dependence, respiratory depression, coma and death associated with use of oral opioids for treatment of chronic nonmalignant pain.      Continue gabapentin/flexeril.  Cont. topical compounded neuropathic/anti-inflammatory cream with ketamine.      RTC 2-3 mo or for procedure

## 2023-09-29 ENCOUNTER — HOSPITAL ENCOUNTER (OUTPATIENT)
Dept: PAIN MEDICINE | Facility: HOSPITAL | Age: 68
Discharge: HOME | End: 2023-09-29
Payer: MEDICARE

## 2023-09-29 VITALS
HEART RATE: 79 BPM | SYSTOLIC BLOOD PRESSURE: 120 MMHG | BODY MASS INDEX: 40.8 KG/M2 | DIASTOLIC BLOOD PRESSURE: 68 MMHG | HEIGHT: 64 IN | TEMPERATURE: 97.3 F | RESPIRATION RATE: 14 BRPM | OXYGEN SATURATION: 97 % | WEIGHT: 239 LBS

## 2023-09-29 DIAGNOSIS — M54.16 LUMBAR RADICULITIS: Primary | ICD-10-CM

## 2023-09-29 DIAGNOSIS — R52 PAIN: ICD-10-CM

## 2023-09-29 PROCEDURE — 77003 FLUOROGUIDE FOR SPINE INJECT: CPT

## 2023-09-29 PROCEDURE — 25010000002 BUPIVACAINE (PF) 0.25 % SOLUTION: Performed by: ANESTHESIOLOGY

## 2023-09-29 PROCEDURE — 25010000002 DEXAMETHASONE SODIUM PHOSPHATE 10 MG/ML SOLUTION: Performed by: ANESTHESIOLOGY

## 2023-09-29 PROCEDURE — 25510000001 IOPAMIDOL 41 % SOLUTION: Performed by: ANESTHESIOLOGY

## 2023-09-29 RX ORDER — IOPAMIDOL 408 MG/ML
3 INJECTION, SOLUTION INTRATHECAL
Status: COMPLETED | OUTPATIENT
Start: 2023-09-29 | End: 2023-09-29

## 2023-09-29 RX ORDER — BUPIVACAINE HYDROCHLORIDE 2.5 MG/ML
10 INJECTION, SOLUTION EPIDURAL; INFILTRATION; INTRACAUDAL ONCE
Status: COMPLETED | OUTPATIENT
Start: 2023-09-29 | End: 2023-09-29

## 2023-09-29 RX ORDER — DEXAMETHASONE SODIUM PHOSPHATE 10 MG/ML
10 INJECTION, SOLUTION INTRAMUSCULAR; INTRAVENOUS ONCE
Status: COMPLETED | OUTPATIENT
Start: 2023-09-29 | End: 2023-09-29

## 2023-09-29 RX ADMIN — IOPAMIDOL 3 ML: 408 INJECTION, SOLUTION INTRATHECAL at 09:32

## 2023-09-29 RX ADMIN — DEXAMETHASONE SODIUM PHOSPHATE 10 MG: 10 INJECTION, SOLUTION INTRAMUSCULAR; INTRAVENOUS at 09:32

## 2023-09-29 RX ADMIN — BUPIVACAINE HYDROCHLORIDE 10 ML: 2.5 INJECTION, SOLUTION EPIDURAL; INFILTRATION; INTRACAUDAL; PERINEURAL at 09:32

## 2023-09-29 NOTE — DISCHARGE INSTRUCTIONS

## 2023-10-02 ENCOUNTER — TELEPHONE (OUTPATIENT)
Dept: PAIN MEDICINE | Facility: HOSPITAL | Age: 68
End: 2023-10-02
Payer: MEDICARE

## 2023-10-02 NOTE — PROCEDURES
"Subjective    CC righthip/ buttock pain  Sherrie Chand is a 68 y.o. female lumbar radiculitis/postlaminectomy syndrome here for right L4 and L5 nerve root block. No anticoagulation    Pain Assessment   Location of Pain: Lower Back, R Hip, right leg   description of Pain: Dull/Aching, Throbbing, Stabbing  Previous Pain Rating :6  Current Pain Ratin  Aggravating Factors: Activity  Alleviating Factors: Rest, Medication    The following portions of the patient's history were reviewed and updated as appropriate: allergies, current medications, past family history, past medical history, past social history, past surgical history and problem list.    Review of Systems  As in HPI  Objective   Physical Exam   Constitutional: No distress.   Pulmonary/Chest: Effort normal.   Vitals reviewed.  /68 (BP Location: Right arm, Patient Position: Sitting)   Pulse 79   Temp 97.3 °F (36.3 °C) (Skin)   Resp 14   Ht 161.3 cm (63.5\")   Wt 108 kg (239 lb)   LMP  (LMP Unknown)   SpO2 97%   BMI 41.67 kg/m²     Assessment & Plan    underwent right L4-L5 nerve root block     RTC 4-6 weeks or as needed for repeat    DATE OF PROCEDURE:  2023    PREOPERATIVE DIAGNOSIS:   Lumbar radiculitis    POSTOPERATIVE DIAGNOSIS: Same    PROCEDURE PERFORMED: Right L4, L5  Transforaminal Epidural     The patient presents with a history of  lumbar degenerative disc disease with lumbosacral neuritis in the right leg at level [ L4,  L5].  The patient presents today for a transforaminal epidural. The patient understands the risks and benefits of the procedure and wishes to proceed. The patient was seen in the preoperative area.  Patient's consent was obtained and updated.  Vitals were taken.  Patient was then brought to the procedure suite and placed in a prone position. Noninvasive monitoring per routine anesthesia protocol was placed.  The appropriate anatomic area was widely prepped with Chloroprep and draped in a sterile fashion.  " Under fluoroscopic guidance using an AP and lateral view, a 22 guage curved tip spinal needle  was passed through skin anesthetized with 1% Lidocaine without epinephrine. The needle tip was advanced to the inferior medial aspect of the transverse process and carefully walked into the neuroforamin using an AP lateral view.  At no time were parathesias elicited.  At this point 2 mL of a solution containing  1 mL of 0.25% bupivacaine and 1 mL of 10 mg Decadron were injected.  Clear epidural spread using 0.25 mL of  preservative free contrast was obtained.  A sterile dressing was placed over the puncture site.    The patient tolerated the procedure with no complications . They were then brought to the post procedure area where they recovered nicely.    Discharge:  The patient will be discharged home in stable condition.   Patient understands to contact the Center with any post procedure questions or concerns.  Discharge instructions given by nursing staff.

## 2023-11-29 RX ORDER — LISINOPRIL AND HYDROCHLOROTHIAZIDE 25; 20 MG/1; MG/1
1 TABLET ORAL DAILY
Qty: 90 TABLET | Refills: 3 | Status: SHIPPED | OUTPATIENT
Start: 2023-11-29

## 2023-12-11 ENCOUNTER — PATIENT MESSAGE (OUTPATIENT)
Dept: PAIN MEDICINE | Facility: CLINIC | Age: 68
End: 2023-12-11
Payer: MEDICARE

## 2023-12-11 DIAGNOSIS — G89.4 CHRONIC PAIN SYNDROME: ICD-10-CM

## 2023-12-11 RX ORDER — GABAPENTIN 800 MG/1
800 TABLET ORAL 3 TIMES DAILY
Qty: 270 TABLET | Refills: 3 | Status: SHIPPED | OUTPATIENT
Start: 2023-12-11

## 2023-12-11 NOTE — TELEPHONE ENCOUNTER
From: Sherrie Chand  To: Sandra Pak  Sent: 12/11/2023 9:44 AM EST  Subject: Refill Gabapentin    Good morning-I am needing a new prescription for my Gabapentin 800 mg x3 daily sent to [facility] in [place], [place] please. Thank you.

## 2023-12-20 ENCOUNTER — OFFICE VISIT (OUTPATIENT)
Dept: PAIN MEDICINE | Facility: CLINIC | Age: 68
End: 2023-12-20
Payer: MEDICARE

## 2023-12-20 VITALS
OXYGEN SATURATION: 95 % | DIASTOLIC BLOOD PRESSURE: 85 MMHG | HEART RATE: 94 BPM | RESPIRATION RATE: 16 BRPM | WEIGHT: 243 LBS | SYSTOLIC BLOOD PRESSURE: 141 MMHG | BODY MASS INDEX: 42.37 KG/M2

## 2023-12-20 DIAGNOSIS — M96.1 POSTLAMINECTOMY SYNDROME OF LUMBAR REGION: Primary | ICD-10-CM

## 2023-12-20 DIAGNOSIS — Z79.899 HIGH RISK MEDICATION USE: ICD-10-CM

## 2023-12-20 DIAGNOSIS — M25.50 POLYARTHRALGIA: ICD-10-CM

## 2023-12-20 DIAGNOSIS — G89.4 CHRONIC PAIN SYNDROME: ICD-10-CM

## 2023-12-20 RX ORDER — HYDROCODONE BITARTRATE AND ACETAMINOPHEN 7.5; 325 MG/1; MG/1
1 TABLET ORAL 3 TIMES DAILY PRN
Qty: 90 TABLET | Refills: 0 | Status: SHIPPED | OUTPATIENT
Start: 2024-01-19

## 2023-12-20 RX ORDER — HYDROCODONE BITARTRATE AND ACETAMINOPHEN 7.5; 325 MG/1; MG/1
1 TABLET ORAL 3 TIMES DAILY PRN
Qty: 90 TABLET | Refills: 0 | Status: SHIPPED | OUTPATIENT
Start: 2024-02-17

## 2023-12-20 RX ORDER — HYDROCODONE BITARTRATE AND ACETAMINOPHEN 7.5; 325 MG/1; MG/1
1 TABLET ORAL 3 TIMES DAILY PRN
Qty: 90 TABLET | Refills: 0 | Status: SHIPPED | OUTPATIENT
Start: 2023-12-20

## 2023-12-20 NOTE — PROGRESS NOTES
CC  hip/buttock, right leg, lower back pain, joints pain  68-year-old female with chronic back pain/postlaminectomy syndrome history of fusion L2 to to S1, polyarthralgia, S/P left TKA, here for follow-up.  Right lumbar transforaminal JESSICA at L4 and L5 last visit had 50% relief for a few weeks.  Denies any new issues today.  Continues to have good relief of hydrocodone and denies side effects.  Chronic back pain radiating to bilateral lower extremity worse on the right with aching numbness in both legs worse with standing walking or activity.  Denies new weakness saddle anesthesia bladder bowel incontinence.    Pain interfering with daily activity and sleep.  Marginal relief with physical therapy or anti-inflammatories.  Marginal relief with caudal JESSICA in the past.  Declines LESI due to past experience with post dural puncture headache.  60% relief with transforaminal JESSICA.      Utilizes hydrocodone with good relief functional benefit and side effects.      CT abdomen pelvis : L2-S1 posterior spinal fusion hardware appears intact. L4 lobectomy with interbody strut is in place. Advanced degenerative disc endplate changes  are present at the L1-2 level. No acute osseous abnormalities are identified.  L-spine MRI 2016: Multilevel degenerative disc disease and degenerative facet change as well as  multilevel postoperative changes detailed above.  There is a 14 mm of anterolisthesis of L4 on L5 which has worsened from the prior exam where this was only 4 mm.  This results in severe bilateral neural foraminal narrowing but no spinal canal stenosis.    Pain Assessment   Cause of Pain: surgery  Location of Pain: Lower Back, R Hip, L Hip, R Leg, L Leg  Description of Pain: Dull/Aching, Throbbing, Stabbing  Previous Pain Rating : 7  Current Pain Ratin  Aggravating Factors: Activity  Alleviating Factors: Rest, Medication  Previous Treatments: Epidural Steroids, Narcotic Pain Medication, Physical Therapy  Previous  Diagnostic Studies: MRI   PEG Assessment   What number best describes your pain on average in the past week?7  What number best describes how, during the past week, pain has interfered with your enjoyment of life?5  What number best describes how, during the past week, pain has interfered with your general activity? 8     has a past medical history of Arthritis, Depression, Low back pain, Osteoporosis, and Spinal stenosis (5/9/2017).     has a past surgical history that includes Cervical Curettage; Anterior cervical discectomy; Total vaginal hysterectomy; Appendectomy; Back surgery (2017); Mediastinotomy for exploration / drainage / biopsy / removal Fb w/ cervical approach (09/2011); and Replacement total knee (Left, 09/28/2022).     Review of Systems        See HPI      Vitals:    12/20/23 0835   BP: 141/85   Pulse: 94   Resp: 16   SpO2: 95%     Physical Exam  Vitals reviewed.   Constitutional:       General: She is not in acute distress.  Pulmonary:      Effort: Pulmonary effort is normal.   Musculoskeletal:      Lumbar back: Tenderness present. Positive right straight leg raise test.      Comments: Lumbar loading positive, pain on extension of low back past 5 degrees.  TTP on the lumbar facets noted.       PHQ 9 on chart                     opioid risk tool low risk      Assessment /Plan   Diagnoses and all orders for this visit:    1. Postlaminectomy syndrome of lumbar region (Primary)    2. Chronic pain syndrome  -     HYDROcodone-acetaminophen (NORCO) 7.5-325 MG per tablet; Take 1 tablet by mouth 3 (Three) Times a Day As Needed for Severe Pain. DNF before 2/17/2024  Dispense: 90 tablet; Refill: 0  -     HYDROcodone-acetaminophen (NORCO) 7.5-325 MG per tablet; Take 1 tablet by mouth 3 (Three) Times a Day As Needed for Severe Pain. DNF before 1/19/2024  Dispense: 90 tablet; Refill: 0  -     HYDROcodone-acetaminophen (NORCO) 7.5-325 MG per tablet; Take 1 tablet by mouth 3 (Three) Times a Day As Needed for Severe  Pain.  Dispense: 90 tablet; Refill: 0    3. Polyarthralgia    4. High risk medication use        Summary   68-year-old female with chronic back pain here for follow-up.    chronic pain from lumbar DDD spondylosis with radicular pain.  Chronic polyarthralgia.  Repeat right L4-L5 transforaminal JESSICA or SI injection as needed.    Right lumbar transforaminal JESSICA at L4 and L5 last visit had 50% relief for a few weeks.  Denies any new issues today.  Continues to have good relief of hydrocodone and denies side effects.    Continue hydrocodone 7.5/25 3 times daily.  UDS and inspect reviewed.  Discussed risk of tolerance, dependence, respiratory depression, coma and death associated with use of oral opioids for treatment of chronic nonmalignant pain.      Continue gabapentin/flexeril.  Cont. topical compounded neuropathic/anti-inflammatory cream with ketamine.      RTC 2-3 mo

## 2023-12-21 RX ORDER — ATORVASTATIN CALCIUM 40 MG/1
40 TABLET, FILM COATED ORAL NIGHTLY
Qty: 90 TABLET | Refills: 3 | Status: SHIPPED | OUTPATIENT
Start: 2023-12-21

## 2024-01-01 NOTE — PATIENT INSTRUCTIONS
Health Maintenance Due   Topic Date Due    TDAP/TD VACCINES (1 - Tdap) Never done    ZOSTER VACCINE (1 of 2) Never done    MAMMOGRAM  12/28/2023    Consider RSV and Prevnar 20 at pharmacy.    12 hour fast  for labs   Take 1/2 Duloxitine daily for one week and 1/4 for one week then off nd start Sertaline.    Patient to call insurnce and see if will cover Ozempic, Mounjaro, Rybelsus, Trulicity

## 2024-01-04 ENCOUNTER — OFFICE VISIT (OUTPATIENT)
Dept: FAMILY MEDICINE CLINIC | Facility: CLINIC | Age: 69
End: 2024-01-04
Payer: MEDICARE

## 2024-01-04 VITALS
TEMPERATURE: 97.7 F | DIASTOLIC BLOOD PRESSURE: 65 MMHG | SYSTOLIC BLOOD PRESSURE: 106 MMHG | WEIGHT: 244.4 LBS | BODY MASS INDEX: 41.73 KG/M2 | OXYGEN SATURATION: 97 % | HEIGHT: 64 IN | HEART RATE: 82 BPM

## 2024-01-04 DIAGNOSIS — F41.9 ANXIETY: ICD-10-CM

## 2024-01-04 DIAGNOSIS — R73.9 HYPERGLYCEMIA: ICD-10-CM

## 2024-01-04 DIAGNOSIS — I10 PRIMARY HYPERTENSION: ICD-10-CM

## 2024-01-04 DIAGNOSIS — E78.5 HYPERLIPIDEMIA, UNSPECIFIED HYPERLIPIDEMIA TYPE: ICD-10-CM

## 2024-01-04 DIAGNOSIS — E66.01 CLASS 3 SEVERE OBESITY DUE TO EXCESS CALORIES WITH SERIOUS COMORBIDITY AND BODY MASS INDEX (BMI) OF 40.0 TO 44.9 IN ADULT: ICD-10-CM

## 2024-01-04 DIAGNOSIS — E53.8 B12 DEFICIENCY: ICD-10-CM

## 2024-01-04 DIAGNOSIS — Z12.31 ENCOUNTER FOR SCREENING MAMMOGRAM FOR MALIGNANT NEOPLASM OF BREAST: Primary | ICD-10-CM

## 2024-01-04 DIAGNOSIS — F32.A DEPRESSION, UNSPECIFIED DEPRESSION TYPE: ICD-10-CM

## 2024-01-04 DIAGNOSIS — M81.0 AGE RELATED OSTEOPOROSIS, UNSPECIFIED PATHOLOGICAL FRACTURE PRESENCE: ICD-10-CM

## 2024-01-04 RX ORDER — SERTRALINE HYDROCHLORIDE 25 MG/1
25 TABLET, FILM COATED ORAL DAILY
Qty: 30 TABLET | Refills: 1 | Status: SHIPPED | OUTPATIENT
Start: 2024-01-04

## 2024-01-04 NOTE — PROGRESS NOTES
Subjective   Sherrie Chand is a 68 y.o. female presents for   Chief Complaint   Patient presents with    Hypertension    Hyperlipidemia     Not fasting       Health Maintenance Due   Topic Date Due    TDAP/TD VACCINES (1 - Tdap) Never done    ZOSTER VACCINE (1 of 2) Never done    MAMMOGRAM  12/28/2023       DAXHPI    The patient is a 68-year-old female who is here today for screening for breast cancer, B12 deficiency, depression, hyperglycemia, hyperlipidemia, hypertension, age-related osteoporosis, anxiety, class 3 severe obesity with serious comorbidities, and a body mass index of 42.    Her tingling in her hands has worsened, and she drops things. Some days she does not want to do anything. She denies feeling homicidal or suicidal. She was placed on [place] wort years ago when she smoked, and they could not give her hormones. She has not smoked in 7 years. Her anxiety is not well controlled. She takes Cymbalta 30 mg capsule once a day. She has not tried Prozac, sertraline, or Paxil.    She tries to watch her carbohydrates and saturated fats, but it does not help. Some days she does not have 1000 calories. She is still not losing weight. She walks. She denies a history of thyroid cancer or multiple endocrine neoplasia type 2.     She was referred to an endocrinologist due to swelling and nodules. She was told to watch them, but nothing has changed.    She last saw her eye doctor in 04/2023.     She does not think she snores at night.     She does self-breast exams once a month.    She denies a family history of depression.    The patient is allergic to CARISOPRODOL.     She received her influenza vaccine in 10/2023. Her last pneumonia vaccine was in 2019.    Vitals:    01/04/24 0912 01/04/24 0913 01/04/24 0914   BP: 117/74 123/78 106/65   BP Location: Left arm Right arm Right arm   Patient Position: Sitting Sitting Standing   Cuff Size: Adult Adult Adult   Pulse: 82     Temp: 97.7 °F (36.5 °C)     TempSrc:  "Tympanic     SpO2: 97%     Weight: 111 kg (244 lb 6.4 oz)     Height: 161.3 cm (63.5\")       Body mass index is 42.61 kg/m².    Current Outpatient Medications on File Prior to Visit   Medication Sig Dispense Refill    atorvastatin (LIPITOR) 40 MG tablet TAKE 1 TABLET EVERY NIGHT 90 tablet 3    cyclobenzaprine (FLEXERIL) 10 MG tablet TAKE 1 TABLET THREE TIMES DAILY AS NEEDED FOR MUSCLE SPASM(S) 90 tablet 11    gabapentin (NEURONTIN) 800 MG tablet Take 1 tablet by mouth 3 (Three) Times a Day. 270 tablet 3    [START ON 2/17/2024] HYDROcodone-acetaminophen (NORCO) 7.5-325 MG per tablet Take 1 tablet by mouth 3 (Three) Times a Day As Needed for Severe Pain. DNF before 2/17/2024 90 tablet 0    [START ON 1/19/2024] HYDROcodone-acetaminophen (NORCO) 7.5-325 MG per tablet Take 1 tablet by mouth 3 (Three) Times a Day As Needed for Severe Pain. DNF before 1/19/2024 90 tablet 0    HYDROcodone-acetaminophen (NORCO) 7.5-325 MG per tablet Take 1 tablet by mouth 3 (Three) Times a Day As Needed for Severe Pain. 90 tablet 0    lisinopril-hydrochlorothiazide (PRINZIDE,ZESTORETIC) 20-25 MG per tablet TAKE 1 TABLET EVERY DAY 90 tablet 3    Misc. Devices (Bariatric Rollator) misc 1 each Daily. Indications: back pain and needs seat to rest forwalking. 1 each 0    [place] Wort 150 MG capsule Take 1 capsule by mouth Daily.      sulindac (CLINORIL) 200 MG tablet Take 1 tablet by mouth 2 (Two) Times a Day. 180 tablet 0    vitamin B-6 (PYRIDOXINE) 100 MG tablet Take 3 tablets by mouth Daily.      [DISCONTINUED] DULoxetine (CYMBALTA) 60 MG capsule Take 1 capsule by mouth Daily. 90 capsule 1     No current facility-administered medications on file prior to visit.       The following portions of the patient's history were reviewed and updated as appropriate: allergies, current medications, past family history, past medical history, past social history, past surgical history, and problem list.    Review of Systems   Constitutional:  Negative for " chills, diaphoresis, fatigue and fever.   HENT:  Negative for hearing loss, tinnitus and trouble swallowing.    Eyes:  Negative for blurred vision and double vision.   Respiratory:  Negative for cough, chest tightness, shortness of breath and wheezing.    Cardiovascular:  Negative for chest pain, palpitations and leg swelling.   Gastrointestinal:  Negative for abdominal pain, blood in stool, constipation, diarrhea, nausea, vomiting and indigestion.   Genitourinary:  Negative for breast discharge, breast lump, breast pain, dysuria, hematuria and vaginal discharge.   Neurological:  Negative for dizziness, tremors, seizures, syncope, weakness, numbness, headache, memory problem and confusion.   Psychiatric/Behavioral:  Negative for suicidal ideas.      DAXROS    Objective   Physical Exam  Vitals reviewed.   Constitutional:       General: She is not in acute distress.     Appearance: Normal appearance. She is well-developed. She is obese. She is not ill-appearing or toxic-appearing.   HENT:      Head: Normocephalic and atraumatic.      Right Ear: Tympanic membrane, ear canal and external ear normal.      Left Ear: Tympanic membrane, ear canal and external ear normal.      Nose: Nose normal.      Mouth/Throat:      Mouth: Mucous membranes are moist.      Pharynx: Oropharynx is clear.   Eyes:      Extraocular Movements: Extraocular movements intact.      Conjunctiva/sclera: Conjunctivae normal.      Pupils: Pupils are equal, round, and reactive to light.   Neck:      Comments: Patient does have a goiter, but I do not feel any discrete masses.  Cardiovascular:      Rate and Rhythm: Normal rate and regular rhythm.      Pulses: Normal pulses.      Heart sounds: Normal heart sounds. No murmur heard.     No gallop.   Pulmonary:      Effort: Pulmonary effort is normal.      Breath sounds: Normal breath sounds. No wheezing, rhonchi or rales.   Abdominal:      General: Bowel sounds are normal. There is no distension.       Palpations: Abdomen is soft. There is no mass.      Tenderness: There is no abdominal tenderness.      Hernia: No hernia is present.   Musculoskeletal:         General: Normal range of motion.      Cervical back: Neck supple.      Right lower leg: No edema.      Left lower leg: No edema.   Skin:     General: Skin is warm.   Neurological:      General: No focal deficit present.      Mental Status: She is alert and oriented to person, place, and time.   Psychiatric:         Mood and Affect: Mood normal.         Behavior: Behavior normal.       DAXEXAM  PHQ-9 Total Score: 0    Assessment & Plan   Diagnoses and all orders for this visit:    1. Encounter for screening mammogram for malignant neoplasm of breast (Primary)  Assessment & Plan:  - order placed for mammogram    Orders:  -     Mammo Screening Digital Tomosynthesis Bilateral With CAD; Future    2. B12 deficiency  Assessment & Plan:  - order placed for future fasting labs    Orders:  -     CBC Auto Differential  -     Vitamin B12    3. Depression, unspecified depression type  Assessment & Plan:  - advised patient to taper off Cymbalta by taking half of a capsule for 1 week, then quarter of a capsule for 1 week. Once she is off the duloxetine, she will start sertraline 25 mg.    Orders:  -     Magnesium  -     TSH    4. Hyperglycemia  Assessment & Plan:  - advised patient to monitor carbohydrate intake  - order placed for future fasting labs    Orders:  -     Comprehensive Metabolic Panel  -     Hemoglobin A1c    5. Hyperlipidemia, unspecified hyperlipidemia type  Assessment & Plan:  - advised patient to monitor saturated fat intake  - order placed for future fasting labs    Orders:  -     Lipid Panel    6. Primary hypertension    7. Age related osteoporosis, unspecified pathological fracture presence    8. Anxiety    9. Class 3 severe obesity due to excess calories with serious comorbidity and body mass index (BMI) of 40.0 to 44.9 in adult  Assessment & Plan:  -  advised patient to contact her insurance to see if they cover some of the new medicines like Ozempic, Mounjaro, and Trulicity.      Other orders  -     sertraline (Zoloft) 25 MG tablet; Take 1 tablet by mouth Daily.  Dispense: 30 tablet; Refill: 1        DAXPLAN  DAXRESULTS    Patient Instructions     Health Maintenance Due   Topic Date Due    TDAP/TD VACCINES (1 - Tdap) Never done    ZOSTER VACCINE (1 of 2) Never done    MAMMOGRAM  12/28/2023    Consider RSV and Prevnar 20 at pharmacy.    12 hour fast  for labs   Take 1/2 Duloxitine daily for one week and 1/4 for one week then off nd start Sertaline.    Patient to call insurnce and see if will cover Ozempic, Mounjaro, Rybelsus, Trulicity       Transcribed from ambient dictation for Ban Alexander MD by Mariah Novak.  01/04/24   10:59 EST    Patient or patient representative verbalized consent to the visit recording.  I have personally performed the services described in this document as transcribed by the above individual, and it is both accurate and complete.

## 2024-01-04 NOTE — ASSESSMENT & PLAN NOTE
- advised patient to taper off Cymbalta by taking half of a capsule for 1 week, then quarter of a capsule for 1 week. Once she is off the duloxetine, she will start sertraline 25 mg.

## 2024-01-04 NOTE — ASSESSMENT & PLAN NOTE
- advised patient to contact her insurance to see if they cover some of the new medicines like Ozempic, Mounjaro, and Trulicity.

## 2024-01-23 ENCOUNTER — TELEPHONE (OUTPATIENT)
Dept: FAMILY MEDICINE CLINIC | Facility: CLINIC | Age: 69
End: 2024-01-23
Payer: MEDICARE

## 2024-01-23 NOTE — TELEPHONE ENCOUNTER
"----- Message from Ban Alexander MD sent at 1/23/2024  1:16 PM EST -----  Regarding: FW: Follow up on last visit  Contact: [phone]  Please put in and I'll sign-Mounjaro  ----- Message -----  From: Karol Maxwell MA  Sent: 1/23/2024   9:59 AM EST  To: Ban Alexander MD  Subject: FW: Follow up on last visit                        ----- Message -----  From: Sherrie Chand \"Carlos\"  Sent: 1/23/2024   9:39 AM EST  To: Yue Santana [place] [facility]  Subject: Follow up on last visit                          Good morning- I contacted my Insurance coverage for prescription this morning to begin my weight loss. They cover Ozempic and Mounjaro. A 3 month supply is covered but a PRIOR AUTHORIZATION is needed. The phone number in this department is [phone]. Thank you so much!       "

## 2024-01-24 ENCOUNTER — TELEPHONE (OUTPATIENT)
Dept: FAMILY MEDICINE CLINIC | Facility: CLINIC | Age: 69
End: 2024-01-24
Payer: MEDICARE

## 2024-01-25 NOTE — TELEPHONE ENCOUNTER
Please call and make sure that there is no history in patient or family of thyroid cancer multiple endocrine neoplasia type II.  And make sure she carries sugar with her at all times.  Please make sure that she gets in for her labs prior to starting the Mounjaro

## 2024-01-25 NOTE — TELEPHONE ENCOUNTER
Sherrie Chand (Quick: [id number]) - [id number]  Mounjaro 2.5MG/0.5ML pen-injectors  Status: PA Response - Approved

## 2024-01-31 ENCOUNTER — CLINICAL SUPPORT (OUTPATIENT)
Dept: FAMILY MEDICINE CLINIC | Facility: CLINIC | Age: 69
End: 2024-01-31
Payer: MEDICARE

## 2024-01-31 DIAGNOSIS — F41.9 ANXIETY: Primary | ICD-10-CM

## 2024-01-31 LAB
ALBUMIN SERPL-MCNC: 4.1 G/DL (ref 3.5–5.2)
ALBUMIN/GLOB SERPL: 1.6 G/DL
ALP SERPL-CCNC: 83 U/L (ref 39–117)
ALT SERPL W P-5'-P-CCNC: 25 U/L (ref 1–33)
ANION GAP SERPL CALCULATED.3IONS-SCNC: 10.4 MMOL/L (ref 5–15)
AST SERPL-CCNC: 22 U/L (ref 1–32)
BASOPHILS # BLD AUTO: 0.05 10*3/MM3 (ref 0–0.2)
BASOPHILS NFR BLD AUTO: 0.4 % (ref 0–1.5)
BILIRUB SERPL-MCNC: 0.3 MG/DL (ref 0–1.2)
BUN SERPL-MCNC: 14 MG/DL (ref 8–23)
BUN/CREAT SERPL: 14.9 (ref 7–25)
CALCIUM SPEC-SCNC: 9.5 MG/DL (ref 8.6–10.5)
CHLORIDE SERPL-SCNC: 101 MMOL/L (ref 98–107)
CHOLEST SERPL-MCNC: 116 MG/DL (ref 0–200)
CO2 SERPL-SCNC: 27.6 MMOL/L (ref 22–29)
CREAT SERPL-MCNC: 0.94 MG/DL (ref 0.57–1)
DEPRECATED RDW RBC AUTO: 42.5 FL (ref 37–54)
EGFRCR SERPLBLD CKD-EPI 2021: 66.6 ML/MIN/1.73
EOSINOPHIL # BLD AUTO: 0.11 10*3/MM3 (ref 0–0.4)
EOSINOPHIL NFR BLD AUTO: 0.9 % (ref 0.3–6.2)
ERYTHROCYTE [DISTWIDTH] IN BLOOD BY AUTOMATED COUNT: 13 % (ref 12.3–15.4)
GLOBULIN UR ELPH-MCNC: 2.6 GM/DL
GLUCOSE SERPL-MCNC: 107 MG/DL (ref 65–99)
HBA1C MFR BLD: 5.7 % (ref 4.8–5.6)
HCT VFR BLD AUTO: 37.8 % (ref 34–46.6)
HDLC SERPL-MCNC: 34 MG/DL (ref 40–60)
HGB BLD-MCNC: 12.6 G/DL (ref 12–15.9)
IMM GRANULOCYTES # BLD AUTO: 0.11 10*3/MM3 (ref 0–0.05)
IMM GRANULOCYTES NFR BLD AUTO: 0.9 % (ref 0–0.5)
LDLC SERPL CALC-MCNC: 52 MG/DL (ref 0–100)
LDLC/HDLC SERPL: 1.36 {RATIO}
LYMPHOCYTES # BLD AUTO: 1.92 10*3/MM3 (ref 0.7–3.1)
LYMPHOCYTES NFR BLD AUTO: 15.5 % (ref 19.6–45.3)
MAGNESIUM SERPL-MCNC: 1.7 MG/DL (ref 1.6–2.4)
MCH RBC QN AUTO: 30.5 PG (ref 26.6–33)
MCHC RBC AUTO-ENTMCNC: 33.3 G/DL (ref 31.5–35.7)
MCV RBC AUTO: 91.5 FL (ref 79–97)
MONOCYTES # BLD AUTO: 0.91 10*3/MM3 (ref 0.1–0.9)
MONOCYTES NFR BLD AUTO: 7.3 % (ref 5–12)
NEUTROPHILS NFR BLD AUTO: 75 % (ref 42.7–76)
NEUTROPHILS NFR BLD AUTO: 9.29 10*3/MM3 (ref 1.7–7)
NRBC BLD AUTO-RTO: 0 /100 WBC (ref 0–0.2)
PLATELET # BLD AUTO: 385 10*3/MM3 (ref 140–450)
PMV BLD AUTO: 10.9 FL (ref 6–12)
POTASSIUM SERPL-SCNC: 4.2 MMOL/L (ref 3.5–5.2)
PROT SERPL-MCNC: 6.7 G/DL (ref 6–8.5)
RBC # BLD AUTO: 4.13 10*6/MM3 (ref 3.77–5.28)
SODIUM SERPL-SCNC: 139 MMOL/L (ref 136–145)
TRIGL SERPL-MCNC: 178 MG/DL (ref 0–150)
TSH SERPL DL<=0.05 MIU/L-ACNC: 1.76 UIU/ML (ref 0.27–4.2)
VIT B12 BLD-MCNC: 474 PG/ML (ref 211–946)
VLDLC SERPL-MCNC: 30 MG/DL (ref 5–40)
WBC NRBC COR # BLD AUTO: 12.39 10*3/MM3 (ref 3.4–10.8)

## 2024-01-31 PROCEDURE — 83735 ASSAY OF MAGNESIUM: CPT | Performed by: PREVENTIVE MEDICINE

## 2024-01-31 PROCEDURE — 80061 LIPID PANEL: CPT | Performed by: PREVENTIVE MEDICINE

## 2024-01-31 PROCEDURE — 36415 COLL VENOUS BLD VENIPUNCTURE: CPT | Performed by: PREVENTIVE MEDICINE

## 2024-01-31 PROCEDURE — 80053 COMPREHEN METABOLIC PANEL: CPT | Performed by: PREVENTIVE MEDICINE

## 2024-01-31 PROCEDURE — 84443 ASSAY THYROID STIM HORMONE: CPT | Performed by: PREVENTIVE MEDICINE

## 2024-01-31 PROCEDURE — 82607 VITAMIN B-12: CPT | Performed by: PREVENTIVE MEDICINE

## 2024-01-31 PROCEDURE — 85025 COMPLETE CBC W/AUTO DIFF WBC: CPT | Performed by: PREVENTIVE MEDICINE

## 2024-01-31 PROCEDURE — 83036 HEMOGLOBIN GLYCOSYLATED A1C: CPT | Performed by: PREVENTIVE MEDICINE

## 2024-01-31 NOTE — PATIENT INSTRUCTIONS
Health Maintenance Due   Topic Date Due    TDAP/TD VACCINES (1 - Tdap) Never done    ZOSTER VACCINE (1 of 2) Never done    MAMMOGRAM  12/28/2023    Call 3 weeks to report progress on Jacquelyn

## 2024-01-31 NOTE — ASSESSMENT & PLAN NOTE
Patient's (There is no height or weight on file to calculate BMI.) indicates that they are morbidly/severely obese (BMI > 40 or > 35 with obesity - related health condition) with health conditions that include hypertension, dyslipidemias, GERD, and osteoarthritis . Weight is unchanged. BMI  is above average; BMI management plan is completed. We discussed portion control and increasing exercise.

## 2024-02-01 ENCOUNTER — OFFICE VISIT (OUTPATIENT)
Dept: FAMILY MEDICINE CLINIC | Facility: CLINIC | Age: 69
End: 2024-02-01
Payer: MEDICARE

## 2024-02-01 ENCOUNTER — TELEPHONE (OUTPATIENT)
Dept: FAMILY MEDICINE CLINIC | Facility: CLINIC | Age: 69
End: 2024-02-01

## 2024-02-01 VITALS
HEIGHT: 64 IN | TEMPERATURE: 97.8 F | WEIGHT: 244.2 LBS | BODY MASS INDEX: 41.69 KG/M2 | DIASTOLIC BLOOD PRESSURE: 80 MMHG | OXYGEN SATURATION: 98 % | HEART RATE: 98 BPM | SYSTOLIC BLOOD PRESSURE: 119 MMHG

## 2024-02-01 DIAGNOSIS — Z12.31 ENCOUNTER FOR SCREENING MAMMOGRAM FOR MALIGNANT NEOPLASM OF BREAST: ICD-10-CM

## 2024-02-01 DIAGNOSIS — F32.A DEPRESSION, UNSPECIFIED DEPRESSION TYPE: Primary | ICD-10-CM

## 2024-02-01 DIAGNOSIS — E66.01 CLASS 3 SEVERE OBESITY DUE TO EXCESS CALORIES WITH SERIOUS COMORBIDITY AND BODY MASS INDEX (BMI) OF 40.0 TO 44.9 IN ADULT: ICD-10-CM

## 2024-02-01 DIAGNOSIS — I10 PRIMARY HYPERTENSION: ICD-10-CM

## 2024-02-01 NOTE — TELEPHONE ENCOUNTER
----- Message from Ban Alexander MD sent at 2/1/2024  9:47 AM EST -----  White blood cell count is slightly elevated so if you have any signs of infection to include fever cough sore throat burning with urination stomach pain headache ear pain or other discomfort we should recheck you.  Glucose was 107 with A1c showing good control at 5.7 but still increased risk for diabetes so limit your carbs and increase your walking.  Vitamin B12 is slightly decreased I would take of 1000 units 1 extra day a week.  Call if any other questions or concerns

## 2024-02-01 NOTE — PROGRESS NOTES
"Subjective   Sherrie Chand is a 69 y.o. female presents for   Chief Complaint   Patient presents with    Depression     4 week check up.  Patient feels like she is doing much better since starting this medication.  Lab work was completed yesterday.       Health Maintenance Due   Topic Date Due    TDAP/TD VACCINES (1 - Tdap) Never done    ZOSTER VACCINE (1 of 2) Never done    MAMMOGRAM  12/28/2023     The patient is a 69-year-old female who is here today to follow up on depression, encounter for screening mammogram, hypertension, class III severe obesity with serious comorbidities, and a body mass index of 42.    Her depression is doing better on the Zoloft. She denies feeling homicidal or suicidal.    She has not scheduled her breast cancer screening yet.      She denies any family history of Zoloft or thyroid cancer.    She is allergic to CARISOPRODOL.     Vitals:    02/01/24 1046 02/01/24 1048 02/01/24 1049   BP: 118/78 123/77 119/80   BP Location: Right arm Left arm Left arm   Patient Position: Sitting Sitting Standing   Cuff Size: Adult Adult Adult   Pulse: 108 105 98   Temp: 97.8 °F (36.6 °C)     TempSrc: Temporal     SpO2: 98%     Weight: 111 kg (244 lb 3.2 oz)     Height: 161.3 cm (63.5\")       Body mass index is 42.58 kg/m².    Current Outpatient Medications on File Prior to Visit   Medication Sig Dispense Refill    atorvastatin (LIPITOR) 40 MG tablet TAKE 1 TABLET EVERY NIGHT 90 tablet 3    cyclobenzaprine (FLEXERIL) 10 MG tablet TAKE 1 TABLET THREE TIMES DAILY AS NEEDED FOR MUSCLE SPASM(S) 90 tablet 11    gabapentin (NEURONTIN) 800 MG tablet Take 1 tablet by mouth 3 (Three) Times a Day. 270 tablet 3    [START ON 2/17/2024] HYDROcodone-acetaminophen (NORCO) 7.5-325 MG per tablet Take 1 tablet by mouth 3 (Three) Times a Day As Needed for Severe Pain. DNF before 2/17/2024 90 tablet 0    HYDROcodone-acetaminophen (NORCO) 7.5-325 MG per tablet Take 1 tablet by mouth 3 (Three) Times a Day As Needed for Severe " Pain. DNF before 1/19/2024 90 tablet 0    HYDROcodone-acetaminophen (NORCO) 7.5-325 MG per tablet Take 1 tablet by mouth 3 (Three) Times a Day As Needed for Severe Pain. 90 tablet 0    lisinopril-hydrochlorothiazide (PRINZIDE,ZESTORETIC) 20-25 MG per tablet TAKE 1 TABLET EVERY DAY 90 tablet 3    Misc. Devices (Bariatric Rollator) misc 1 each Daily. Indications: back pain and needs seat to rest forwalking. 1 each 0    sertraline (Zoloft) 25 MG tablet Take 1 tablet by mouth Daily. 30 tablet 1    St Johns Wort 150 MG capsule Take 1 capsule by mouth Daily.      sulindac (CLINORIL) 200 MG tablet Take 1 tablet by mouth 2 (Two) Times a Day. 180 tablet 0    Tirzepatide (MOUNJARO) 2.5 MG/0.5ML solution pen-injector pen Inject 0.5 mL under the skin into the appropriate area as directed Every 7 (Seven) Days for 4 doses. Inject weekly for 4 weeks. 2 mL 0    vitamin B-6 (PYRIDOXINE) 100 MG tablet Take 3 tablets by mouth Daily.       No current facility-administered medications on file prior to visit.       The following portions of the patient's history were reviewed and updated as appropriate: allergies, current medications, past family history, past medical history, past social history, past surgical history, and problem list.    Review of Systems   Constitutional: Negative.    HENT: Negative.     Eyes: Negative.    Respiratory: Negative.     Cardiovascular: Negative.    Gastrointestinal: Negative.    Endocrine: Negative.    Genitourinary: Negative.    Musculoskeletal: Negative.    Skin: Negative.    Allergic/Immunologic: Negative.    Neurological: Negative.    Hematological: Negative.    Psychiatric/Behavioral: Negative.         Objective   Physical Exam  Vitals reviewed.   Constitutional:       General: She is not in acute distress.     Appearance: Normal appearance. She is well-developed. She is not ill-appearing or toxic-appearing.   HENT:      Head: Normocephalic and atraumatic.      Right Ear: Tympanic membrane, ear  canal and external ear normal.      Left Ear: Tympanic membrane, ear canal and external ear normal.      Nose: Nose normal.   Eyes:      Extraocular Movements: Extraocular movements intact.      Conjunctiva/sclera: Conjunctivae normal.      Pupils: Pupils are equal, round, and reactive to light.   Cardiovascular:      Rate and Rhythm: Normal rate and regular rhythm.      Heart sounds: Normal heart sounds.   Pulmonary:      Effort: Pulmonary effort is normal.      Breath sounds: Normal breath sounds.   Abdominal:      General: Bowel sounds are normal. There is no distension.      Palpations: Abdomen is soft. There is no mass.      Tenderness: There is no abdominal tenderness.   Musculoskeletal:         General: Normal range of motion.      Cervical back: Neck supple.   Skin:     General: Skin is warm.   Neurological:      General: No focal deficit present.      Mental Status: She is alert and oriented to person, place, and time.   Psychiatric:         Mood and Affect: Mood normal.         Behavior: Behavior normal.         PHQ-9 Total Score:      Assessment & Plan   Diagnoses and all orders for this visit:    1. Depression, unspecified depression type (Primary)    2. Encounter for screening mammogram for malignant neoplasm of breast    3. Primary hypertension    4. Class 3 severe obesity due to excess calories with serious comorbidity and body mass index (BMI) of 40.0 to 44.9 in adult  Assessment & Plan:  Patient's (There is no height or weight on file to calculate BMI.) indicates that they are morbidly/severely obese (BMI > 40 or > 35 with obesity - related health condition) with health conditions that include hypertension, dyslipidemias, GERD, and osteoarthritis . Weight is unchanged. BMI  is above average; BMI management plan is completed. We discussed portion control and increasing exercise.         1. Depression.    2. Hypertension.  Her blood pressure looks good today.    3. Class III severe obesity with  serious comorbidities and a body mass index of 42.  She is still at increased risk for diabetes. She was recommended to watch her carbohydrate intake and increase her walking.    4. Vitamin B12 deficiency.  She was recommended to take extra vitamin B12 once a week.    Follow-up  The patient will follow up in 3 months.    Patient Instructions     Health Maintenance Due   Topic Date Due    TDAP/TD VACCINES (1 - Tdap) Never done    ZOSTER VACCINE (1 of 2) Never done    MAMMOGRAM  12/28/2023    Call 3 weeks to report progress on Mounaro       Transcribed from ambient dictation for Ban Alexander MD by Lashay Celis.  02/01/24   11:37 EST    Patient or patient representative verbalized consent to the visit recording.  I have personally performed the services described in this document as transcribed by the above individual, and it is both accurate and complete.

## 2024-03-04 RX ORDER — SERTRALINE HYDROCHLORIDE 25 MG/1
25 TABLET, FILM COATED ORAL DAILY
Qty: 90 TABLET | Refills: 1 | Status: SHIPPED | OUTPATIENT
Start: 2024-03-04

## 2024-03-13 ENCOUNTER — OFFICE VISIT (OUTPATIENT)
Dept: PAIN MEDICINE | Facility: CLINIC | Age: 69
End: 2024-03-13
Payer: MEDICARE

## 2024-03-13 VITALS
WEIGHT: 236 LBS | HEART RATE: 84 BPM | OXYGEN SATURATION: 97 % | DIASTOLIC BLOOD PRESSURE: 79 MMHG | SYSTOLIC BLOOD PRESSURE: 145 MMHG | RESPIRATION RATE: 16 BRPM | BODY MASS INDEX: 41.15 KG/M2

## 2024-03-13 DIAGNOSIS — M47.814 THORACIC SPONDYLOSIS WITHOUT MYELOPATHY: ICD-10-CM

## 2024-03-13 DIAGNOSIS — M96.1 POSTLAMINECTOMY SYNDROME OF LUMBAR REGION: ICD-10-CM

## 2024-03-13 DIAGNOSIS — G89.4 CHRONIC PAIN SYNDROME: ICD-10-CM

## 2024-03-13 DIAGNOSIS — Z79.899 HIGH RISK MEDICATION USE: Primary | ICD-10-CM

## 2024-03-13 DIAGNOSIS — M25.50 POLYARTHRALGIA: ICD-10-CM

## 2024-03-13 PROCEDURE — 99214 OFFICE O/P EST MOD 30 MIN: CPT | Performed by: ANESTHESIOLOGY

## 2024-03-13 PROCEDURE — 1125F AMNT PAIN NOTED PAIN PRSNT: CPT | Performed by: ANESTHESIOLOGY

## 2024-03-13 PROCEDURE — 1159F MED LIST DOCD IN RCRD: CPT | Performed by: ANESTHESIOLOGY

## 2024-03-13 PROCEDURE — 3078F DIAST BP <80 MM HG: CPT | Performed by: ANESTHESIOLOGY

## 2024-03-13 PROCEDURE — 3077F SYST BP >= 140 MM HG: CPT | Performed by: ANESTHESIOLOGY

## 2024-03-13 PROCEDURE — 1160F RVW MEDS BY RX/DR IN RCRD: CPT | Performed by: ANESTHESIOLOGY

## 2024-03-13 RX ORDER — HYDROCODONE BITARTRATE AND ACETAMINOPHEN 7.5; 325 MG/1; MG/1
1 TABLET ORAL 3 TIMES DAILY PRN
Qty: 90 TABLET | Refills: 0 | Status: SHIPPED | OUTPATIENT
Start: 2024-04-16

## 2024-03-13 RX ORDER — HYDROCODONE BITARTRATE AND ACETAMINOPHEN 7.5; 325 MG/1; MG/1
1 TABLET ORAL 3 TIMES DAILY PRN
Qty: 90 TABLET | Refills: 0 | Status: SHIPPED | OUTPATIENT
Start: 2024-03-17

## 2024-03-13 NOTE — PROGRESS NOTES
CC  hip/buttock, right leg, lower back pain, joints pain  69-year-old female with chronic back pain/postlaminectomy syndrome history of fusion L2 to to S1, polyarthralgia, S/P left TKA, here for follow-up.  Continued back pain bilateral lower extremity pain from DDD and postlaminectomy syndrome.  Good relief with hydrocodone denies side effect. Good relief with injection but not lasting long   Chronic back pain radiating to bilateral lower extremity worse on the right with aching numbness in both legs worse with standing walking.  Back pain interfering with ADL. or activity.  Denies new weakness saddle anesthesia bladder bowel incontinence.    Pain interfering with daily activity and sleep.  Marginal relief with physical therapy or anti-inflammatories.  Marginal relief with caudal JESSICA in the past.  Declines LESI due to past experience with post dural puncture headache.  60% relief with transforaminal JESSICA.      Utilizes hydrocodone with good relief functional benefit and side effects.      CT abdomen pelvis : L2-S1 posterior spinal fusion hardware appears intact. L4 lobectomy with interbody strut is in place. Advanced degenerative disc endplate changes  are present at the L1-2 level. No acute osseous abnormalities are identified.  L-spine MRI 2016: Multilevel degenerative disc disease and degenerative facet change as well as  multilevel postoperative changes detailed above.  There is a 14 mm of anterolisthesis of L4 on L5 which has worsened from the prior exam where this was only 4 mm.  This results in severe bilateral neural foraminal narrowing but no spinal canal stenosis.    Pain Assessment   Cause of Pain: surgery  Location of Pain: Lower Back, R Hip, L Hip, R Leg, L Leg  Description of Pain: Dull/Aching, Throbbing, Stabbing  Previous Pain Rating : 6  Current Pain Ratin  Aggravating Factors: Activity  Alleviating Factors: Rest, Medication  Previous Treatments: Epidural Steroids, Narcotic Pain Medication,  Physical Therapy  Previous Diagnostic Studies: MRI   PEG Assessment   What number best describes your pain on average in the past week?7  What number best describes how, during the past week, pain has interfered with your enjoyment of life?5  What number best describes how, during the past week, pain has interfered with your general activity? 10     has a past medical history of Arthritis, Depression, Low back pain, Osteoporosis, and Spinal stenosis (5/9/2017).     has a past surgical history that includes Cervical Curettage; Anterior cervical discectomy; Total vaginal hysterectomy; Appendectomy; Back surgery (2017); Mediastinotomy for exploration / drainage / biopsy / removal Fb w/ cervical approach (09/2011); and Replacement total knee (Left, 09/28/2022).     Review of Systems        See HPI      Vitals:    03/13/24 0836   BP: 145/79   Pulse: 84   Resp: 16   SpO2: 97%     Physical Exam  Vitals reviewed.   Constitutional:       General: She is not in acute distress.  Pulmonary:      Effort: Pulmonary effort is normal.   Musculoskeletal:      Lumbar back: Tenderness present. Positive right straight leg raise test.      Comments: Lumbar loading positive, pain on extension of low back past 5 degrees.  TTP on the lumbar facets noted.       PHQ 9 on chart                     opioid risk tool low risk      Assessment /Plan   Diagnoses and all orders for this visit:    1. Chronic pain syndrome (Primary)  -     HYDROcodone-acetaminophen (NORCO) 7.5-325 MG per tablet; Take 1 tablet by mouth 3 (Three) Times a Day As Needed for Severe Pain. DNF before 3/17/2024  Dispense: 90 tablet; Refill: 0  -     HYDROcodone-acetaminophen (NORCO) 7.5-325 MG per tablet; Take 1 tablet by mouth 3 (Three) Times a Day As Needed for Severe Pain. DNF before 4/16/2024  Dispense: 90 tablet; Refill: 0    2. Postlaminectomy syndrome of lumbar region  -     XR Spine Lumbar 2 or 3 View; Future    3. Thoracic spondylosis without myelopathy  -     XR  Spine Thoracic 3 View; Future    4. Polyarthralgia    5. High risk medication use        Summary   69-year-old female with chronic back pain, postlaminectomy syndrome here for follow-up.    chronic pain from lumbar DDD spondylosis with radicular pain.  Chronic polyarthralgia.  Repeat right L4-L5 transforaminal JESSICA or SI injection as needed.    Continued back pain bilateral lower extremity pain from DDD and postlaminectomy syndrome.  Good relief with hydrocodone denies side effect. Good relief with injection but not lasting long   Discussed spinal cord stimulator therapy, trial process, information material provided for review.  Will discuss further at next visits.  T-spine and L-spine x-ray ordered for further evaluation.    Continue hydrocodone 7.5/25 3 times daily.  UDS and inspect reviewed.  Discussed risk of tolerance, dependence, respiratory depression, coma and death associated with use of oral opioids for treatment of chronic nonmalignant pain.      Continue gabapentin/flexeril.  Cont. topical compounded neuropathic/anti-inflammatory cream with ketamine.      RTC 2-3 mo

## 2024-03-28 ENCOUNTER — PATIENT MESSAGE (OUTPATIENT)
Dept: FAMILY MEDICINE CLINIC | Facility: CLINIC | Age: 69
End: 2024-03-28
Payer: MEDICARE

## 2024-03-29 NOTE — TELEPHONE ENCOUNTER
"Karol Maxwell MA 3/28/2024 1:25 PM EDT      ----- Message -----  From: Sherrie Chand \"Carlos\"  Sent: 3/28/2024 12:50 PM EDT  To: [facility]  Subject: Tung     Dear Dr Alexander- I only have one injection left and no refills on my last prescription. I have had no problems with the increased dosage. In fact, I could not tell it was increased much at all. My weight loss isn’t going so well. Hopefully it will start soon. Just let me know please what you want me to do.     Have a wonderful Easter weekend.  "

## 2024-04-22 RX ORDER — TIRZEPATIDE 7.5 MG/.5ML
INJECTION, SOLUTION SUBCUTANEOUS
Qty: 2 ML | Refills: 0 | Status: SHIPPED | OUTPATIENT
Start: 2024-04-22

## 2024-04-24 ENCOUNTER — HOSPITAL ENCOUNTER (OUTPATIENT)
Dept: GENERAL RADIOLOGY | Facility: HOSPITAL | Age: 69
Discharge: HOME | End: 2024-04-24
Payer: MEDICARE

## 2024-04-24 DIAGNOSIS — M47.814 THORACIC SPONDYLOSIS WITHOUT MYELOPATHY: ICD-10-CM

## 2024-04-24 DIAGNOSIS — M96.1 POSTLAMINECTOMY SYNDROME OF LUMBAR REGION: ICD-10-CM

## 2024-04-24 PROCEDURE — 72072 X-RAY EXAM THORAC SPINE 3VWS: CPT

## 2024-04-24 PROCEDURE — 72100 X-RAY EXAM L-S SPINE 2/3 VWS: CPT

## 2024-05-01 NOTE — PATIENT INSTRUCTIONS
Health Maintenance Due   Topic Date Due    TDAP/TD VACCINES (1 - Tdap) Never done    ZOSTER VACCINE (1 of 2) Never done    RSV Vaccine - Adults (1 - 1-dose 60+ series) Never done    COVID-19 Vaccine (8 - 2023-24 season) 02/29/2024    Pneumococcal Vaccine 65+ (3 of 3 - PPSV23 or PCV20) 03/12/2024    ANNUAL WELLNESS VISIT  06/29/2024

## 2024-05-01 NOTE — PROGRESS NOTES
The ABCs of the Annual Wellness Visit  Subsequent Medicare Wellness Visit    Subjective    History of Present Illness:  Sherrie Chand is a 69 y.o. female who presents for a Subsequent Medicare Wellness Visit.    The following portions of the patient's history were reviewed and   updated as appropriate: allergies, current medications, past family history, past medical history, past social history, past surgical history, and problem list.    Compared to one year ago, the patient feels her physical   health is the same.    Compared to one year ago, the patient feels her mental   health is better.    Recent Hospitalizations:  She was not admitted to the hospital during the last year.       Current Medical Providers:  Patient Care Team:  Ban Alexander MD as PCP - General (Family Medicine)  Sandra Pak MD as Consulting Physician (Pain Medicine)  Anneliese Hutchins MD as Consulting Physician (Hematology and Oncology)    Outpatient Medications Prior to Visit   Medication Sig Dispense Refill    atorvastatin (LIPITOR) 40 MG tablet TAKE 1 TABLET EVERY NIGHT 90 tablet 3    cyclobenzaprine (FLEXERIL) 10 MG tablet TAKE 1 TABLET THREE TIMES DAILY AS NEEDED FOR MUSCLE SPASM(S) 90 tablet 11    gabapentin (NEURONTIN) 800 MG tablet Take 1 tablet by mouth 3 (Three) Times a Day. 270 tablet 3    HYDROcodone-acetaminophen (NORCO) 7.5-325 MG per tablet Take 1 tablet by mouth 3 (Three) Times a Day As Needed for Severe Pain. 90 tablet 0    HYDROcodone-acetaminophen (NORCO) 7.5-325 MG per tablet Take 1 tablet by mouth 3 (Three) Times a Day As Needed for Severe Pain. DNF before 3/17/2024 90 tablet 0    HYDROcodone-acetaminophen (NORCO) 7.5-325 MG per tablet Take 1 tablet by mouth 3 (Three) Times a Day As Needed for Severe Pain. DNF before 4/16/2024 90 tablet 0    lisinopril-hydrochlorothiazide (PRINZIDE,ZESTORETIC) 20-25 MG per tablet TAKE 1 TABLET EVERY DAY 90 tablet 3    Misc. Devices (Bariatric Rollator) misc 1 each  Daily. Indications: back pain and needs seat to rest forwalking. 1 each 0    Mounjaro 7.5 MG/0.5ML solution pen-injector pen INJECT 7.5 MG UNDER THE SKIN ONCE A WEEK 2 mL 0    sertraline (Zoloft) 25 MG tablet Take 1 tablet by mouth Daily. 90 tablet 1    St Johns Wort 150 MG capsule Take 1 capsule by mouth Daily.      sulindac (CLINORIL) 200 MG tablet Take 1 tablet by mouth 2 (Two) Times a Day. 180 tablet 0    vitamin B-6 (PYRIDOXINE) 100 MG tablet Take 3 tablets by mouth Daily.       No facility-administered medications prior to visit.       Opioid medication/s are on active medication list.  and I have evaluated her active treatment plan and pain score trends (see table).  Vitals:    05/02/24 0803   PainSc: 0-No pain     I have reviewed the chart for potential of high risk medication and harmful drug interactions in the elderly.          Aspirin is not on active medication list.  Aspirin use is not indicated based on review of current medical condition/s. Risk of harm outweighs potential benefits.  .  Patient Active Problem List   Diagnosis    Anxiety    Arthritis    B12 deficiency    Chronic pain    Constipation due to pain medication    Degeneration of lumbar or lumbosacral intervertebral disc    Depression    Family history of lung cancer    Former smoker    Hyperglycemia    Hyperlipidemia    Hypertension    Osteoarthritis of lumbosacral spine without myelopathy    Other long term (current) drug therapy    Vitamin D deficiency    Astigmatism    Pseudoarthrosis of lumbar spine    S/P lumbar fusion    Senile nuclear sclerosis    Spinal stenosis    Tear film insufficiency    Spondylolisthesis at L4-L5 level    Disease of bronchial airway    Osteoporosis    Morbid (severe) obesity due to excess calories    Class 2 severe obesity due to excess calories with serious comorbidity and body mass index (BMI) of 39.0 to 39.9 in adult    Hot flashes     Advance Care Planning  Advance Directive is not on file.  ACP discussion  "was held with the patient during this visit. Patient does not have an advance directive, declines further assistance.     Objective    Vitals:    24 0803 24 0804 24 0806   BP: 113/71 109/58 99/67   BP Location: Left arm Right arm Right arm   Patient Position: Sitting Sitting Sitting   Cuff Size: Adult Adult Adult   Pulse: 100 99 96   Temp: 97.4 °F (36.3 °C)     TempSrc: Temporal     SpO2: 95%     Weight: 103 kg (227 lb 9.6 oz)     Height: 161.3 cm (63.5\")     PainSc: 0-No pain       Estimated body mass index is 39.69 kg/m² as calculated from the following:    Height as of this encounter: 161.3 cm (63.5\").    Weight as of this encounter: 103 kg (227 lb 9.6 oz).           Does the patient have evidence of cognitive impairment? No          HEALTH RISK ASSESSMENT    Smoking Status:  Social History     Tobacco Use   Smoking Status Former    Current packs/day: 0.00    Average packs/day: 0.5 packs/day for 30.0 years (15.0 ttl pk-yrs)    Types: Cigarettes    Start date:     Quit date: 2017    Years since quittin.3   Smokeless Tobacco Never     Alcohol Consumption:  Social History     Substance and Sexual Activity   Alcohol Use No     Fall Risk Screen:    FLORENTINO Fall Risk Assessment was completed, and patient is at HIGH risk for falls. Assessment completed on:2024    Depression Screenin/2/2024     8:02 AM   PHQ-2/PHQ-9 Depression Screening   Little Interest or Pleasure in Doing Things 0-->not at all   Feeling Down, Depressed or Hopeless 0-->not at all   PHQ-9: Brief Depression Severity Measure Score 0       Health Habits and Functional and Cognitive Screenin/2/2024     8:02 AM   Functional & Cognitive Status   Do you have difficulty preparing food and eating? No   Do you have difficulty bathing yourself, getting dressed or grooming yourself? No   Do you have difficulty using the toilet? No   Do you have difficulty moving around from place to place? No   Do you have trouble with " steps or getting out of a bed or a chair? No   Current Diet Well Balanced Diet   Dental Exam Up to date   Eye Exam Up to date   Exercise (times per week) 3 times per week   Current Exercises Include Walking   Do you need help using the phone?  No   Are you deaf or do you have serious difficulty hearing?  No   Do you need help to go to places out of walking distance? No   Do you need help shopping? No   Do you need help preparing meals?  No   Do you need help with housework?  No   Do you need help with laundry? No   Do you need help taking your medications? No   Do you need help managing money? No   Do you ever drive or ride in a car without wearing a seat belt? No   Have you felt unusual stress, anger or loneliness in the last month? No   Who do you live with? Spouse   If you need help, do you have trouble finding someone available to you? No   Have you been bothered in the last four weeks by sexual problems? No   Do you have difficulty concentrating, remembering or making decisions? No       Age-appropriate Screening Schedule:  Refer to the list below for future screening recommendations based on patient's age, sex and/or medical conditions. Orders for these recommended tests are listed in the plan section. The patient has been provided with a written plan.    Health Maintenance   Topic Date Due    TDAP/TD VACCINES (1 - Tdap) Never done    ZOSTER VACCINE (1 of 2) Never done    RSV Vaccine - Adults (1 - 1-dose 60+ series) Never done    COVID-19 Vaccine (8 - 2023-24 season) 02/29/2024    INFLUENZA VACCINE  08/01/2024    DXA SCAN  12/22/2024    LIPID PANEL  01/31/2025    MAMMOGRAM  02/12/2025    ANNUAL WELLNESS VISIT  05/02/2025    BMI FOLLOWUP  05/02/2025    PAP SMEAR  08/04/2025    COLORECTAL CANCER SCREENING  11/29/2025    HEPATITIS C SCREENING  Completed    Pneumococcal Vaccine 65+  Completed    LUNG CANCER SCREENING  Discontinued                CMS Preventative Services Quick Reference  Risk Factors Identified  During Encounter  None Identified  The above risks/problems have been discussed with the patient.  Follow up actions/plans if indicated are seen below in the Assessment/Plan Section.  Pertinent information has been shared with the patient in the After Visit Summary.  An After Visit Summary and PPPS were made available to the patient.    Follow Up:   Next Medicare Wellness visit to be scheduled in 1 year.         Additional E&M Note during same encounter follows:  Patient has multiple medical problems which are significant and separately identifiable that require additional work above and beyond the Medicare Wellness Visit.        Chief Complaint  Medicare Wellness-subsequent (Patient is fasting.) and Hyperlipidemia    Subjective        HPI  Sherrie Garciaer also presents   Chief Complaint   Patient presents with    Medicare Wellness-subsequent     Patient is fasting.    Hyperlipidemia   .  Patient is here for an age-specific physical and she has been advised to wear sunscreen and a seatbelt.  History of Present Illness  The patient is a 69-year-old female who is here today for her annual wellness exam and an age-specific physical for Medicare, vitamin B12 deficiency, class 2 severe obesity due to excess calories with serious comorbidities and body mass index of 39, depression, disease of the bronchial airways, hyperglycemia, hyperlipidemia, hypertension, and age-related osteoporosis.    The patient has been unable to obtain weight loss medication due to difficulties in locating it. She has been off of her medication for a week. She reports no alterations in her hearing or vision, snoring, swallowing difficulties, congestion, hemoptysis, sputum, wheezing, chest pressure, or palpitations. She conducts monthly self-breast examinations. Her bowel movements are regular, and she denies any dysuria, hematuria, hematochezia, or melena. She also denies any abnormal vaginal discharge. She denies systemic symptoms such as  "fever, chills, or night sweats. She is aware of the necessity of sunscreen and a seatbelt. Her physical health has remained stable over the past year, but her mental health has declined. She has not been hospitalized overnight in the past year and does not take aspirin regularly. She does not have an advance directive. She declined additional information on alcohol misuse, chronic pain, depression, drug use, fall risk, glaucoma, hearing problems, immunizations, loneliness, inactivity, polypharmacy, high-risk sexual behavior, tobacco use, or urinary incontinence. She takes vitamin B12 supplements. She monitors her carbohydrate, saturated fats, and sodium intake and attempts to engage in 20 minutes of weight-bearing exercises daily.    The patient reports a significant improvement in her depression symptoms since switching to Zoloft. She denies experiencing homicidal or suicidal ideation.   She is allergic to KERASPOND.    Review of Systems   Constitutional:  Positive for appetite change.   Psychiatric/Behavioral:  Positive for dysphoric mood.        Objective   Vital Signs:  BP 99/67 (BP Location: Right arm, Patient Position: Sitting, Cuff Size: Adult)   Pulse 96   Temp 97.4 °F (36.3 °C) (Temporal)   Ht 161.3 cm (63.5\")   Wt 103 kg (227 lb 9.6 oz)   SpO2 95%   BMI 39.69 kg/m²     Physical Exam  Vitals reviewed.   Constitutional:       General: She is not in acute distress.     Appearance: She is well-developed. She is obese. She is not ill-appearing or toxic-appearing.   HENT:      Head: Normocephalic and atraumatic.      Right Ear: Tympanic membrane, ear canal and external ear normal.      Left Ear: Tympanic membrane, ear canal and external ear normal.      Nose: Nose normal.      Mouth/Throat:      Mouth: Mucous membranes are moist.      Pharynx: No posterior oropharyngeal erythema.   Eyes:      Extraocular Movements: Extraocular movements intact.      Conjunctiva/sclera: Conjunctivae normal.      Pupils: " Pupils are equal, round, and reactive to light.   Cardiovascular:      Rate and Rhythm: Normal rate and regular rhythm.      Heart sounds: Normal heart sounds.   Pulmonary:      Effort: Pulmonary effort is normal.      Breath sounds: Wheezing present.      Comments: Decreased breath sounds bilaterally  Abdominal:      General: Bowel sounds are normal. There is no distension.      Palpations: Abdomen is soft. There is no mass.      Tenderness: There is no abdominal tenderness.   Musculoskeletal:         General: Normal range of motion.      Cervical back: Neck supple.   Skin:     General: Skin is warm.   Neurological:      General: No focal deficit present.      Mental Status: She is alert and oriented to person, place, and time.   Psychiatric:         Mood and Affect: Mood normal.         Behavior: Behavior normal.        Physical Exam  Throat appears normal.          Results  Imaging  Low normal bone density left hip and left radius.             Assessment and Plan   Diagnoses and all orders for this visit:    1. Encounter for annual general medical examination with abnormal findings in adult (Primary)    2. B12 deficiency  -     CBC Auto Differential  -     Vitamin B12    3. Class 2 severe obesity due to excess calories with serious comorbidity and body mass index (BMI) of 39.0 to 39.9 in adult  Assessment & Plan:  Patient's (There is no height or weight on file to calculate BMI.) indicates that they are morbidly/severely obese (BMI > 40 or > 35 with obesity - related health condition) with health conditions that include hypertension and dyslipidemias . Weight is improving with lifestyle modifications. BMI  is above average; BMI management plan is completed. We discussed portion control and increasing exercise.       4. Depression, unspecified depression type  -     Magnesium  -     TSH Rfx On Abnormal To Free T4    5. Disease of bronchial airway    6. Hyperglycemia  -     Comprehensive Metabolic Panel  -      Hemoglobin A1c    7. Hyperlipidemia, unspecified hyperlipidemia type  -     Lipid Panel    8. Primary hypertension    9. Age related osteoporosis, unspecified pathological fracture presence    Other orders  -     Pneumococcal Conjugate Vaccine 20-Valent (PCV20)      Assessment & Plan  1. Annual wellness examination for Medicare.  The patient is advised to utilize sunscreen and a seatbelt for safety. She is also advised to monitor her portion size and engage in regular physical activity to facilitate medication reintroduction. Today, we will conduct a series of blood tests, including CBC, kidney and liver function tests, A1c, magnesium, cholesterol, vitamin B12, and thyroid function. She will also receive her pneumonia 20 vaccine today.    Follow-up  The patient is scheduled for a follow-up visit in 6 months.         Follow Up   Return in about 6 months (around 11/2/2024).  Patient was given instructions and counseling regarding her condition or for health maintenance advice. Please see specific information pulled into the AVS if appropriate.   Patient or patient representative verbalized consent for the use of Ambient Listening during the visit with  Ban Alexander MD for chart documentation. 5/2/2024  18:09 EDT

## 2024-05-01 NOTE — ASSESSMENT & PLAN NOTE
Patient's (There is no height or weight on file to calculate BMI.) indicates that they are morbidly/severely obese (BMI > 40 or > 35 with obesity - related health condition) with health conditions that include hypertension and dyslipidemias . Weight is improving with lifestyle modifications. BMI  is above average; BMI management plan is completed. We discussed portion control and increasing exercise.

## 2024-05-02 ENCOUNTER — OFFICE VISIT (OUTPATIENT)
Dept: FAMILY MEDICINE CLINIC | Facility: CLINIC | Age: 69
End: 2024-05-02
Payer: MEDICARE

## 2024-05-02 VITALS
SYSTOLIC BLOOD PRESSURE: 99 MMHG | HEART RATE: 96 BPM | WEIGHT: 227.6 LBS | BODY MASS INDEX: 38.86 KG/M2 | DIASTOLIC BLOOD PRESSURE: 67 MMHG | HEIGHT: 64 IN | OXYGEN SATURATION: 95 % | TEMPERATURE: 97.4 F

## 2024-05-02 DIAGNOSIS — I10 PRIMARY HYPERTENSION: ICD-10-CM

## 2024-05-02 DIAGNOSIS — F32.A DEPRESSION, UNSPECIFIED DEPRESSION TYPE: ICD-10-CM

## 2024-05-02 DIAGNOSIS — J47.9: ICD-10-CM

## 2024-05-02 DIAGNOSIS — E53.8 B12 DEFICIENCY: ICD-10-CM

## 2024-05-02 DIAGNOSIS — M81.0 AGE RELATED OSTEOPOROSIS, UNSPECIFIED PATHOLOGICAL FRACTURE PRESENCE: ICD-10-CM

## 2024-05-02 DIAGNOSIS — E78.5 HYPERLIPIDEMIA, UNSPECIFIED HYPERLIPIDEMIA TYPE: ICD-10-CM

## 2024-05-02 DIAGNOSIS — R73.9 HYPERGLYCEMIA: ICD-10-CM

## 2024-05-02 DIAGNOSIS — Z00.01 ENCOUNTER FOR ANNUAL GENERAL MEDICAL EXAMINATION WITH ABNORMAL FINDINGS IN ADULT: Primary | ICD-10-CM

## 2024-05-02 DIAGNOSIS — E66.01 CLASS 2 SEVERE OBESITY DUE TO EXCESS CALORIES WITH SERIOUS COMORBIDITY AND BODY MASS INDEX (BMI) OF 39.0 TO 39.9 IN ADULT: ICD-10-CM

## 2024-05-02 PROBLEM — E66.812 CLASS 2 SEVERE OBESITY DUE TO EXCESS CALORIES WITH SERIOUS COMORBIDITY AND BODY MASS INDEX (BMI) OF 39.0 TO 39.9 IN ADULT: Status: ACTIVE | Noted: 2021-06-24

## 2024-05-02 LAB
ALBUMIN SERPL-MCNC: 4.1 G/DL (ref 3.5–5.2)
ALBUMIN/GLOB SERPL: 1.4 G/DL
ALP SERPL-CCNC: 95 U/L (ref 39–117)
ALT SERPL W P-5'-P-CCNC: 24 U/L (ref 1–33)
ANION GAP SERPL CALCULATED.3IONS-SCNC: 16.7 MMOL/L (ref 5–15)
AST SERPL-CCNC: 24 U/L (ref 1–32)
BASOPHILS # BLD AUTO: 0.05 10*3/MM3 (ref 0–0.2)
BASOPHILS NFR BLD AUTO: 0.3 % (ref 0–1.5)
BILIRUB SERPL-MCNC: 0.4 MG/DL (ref 0–1.2)
BUN SERPL-MCNC: 19 MG/DL (ref 8–23)
BUN/CREAT SERPL: 18.4 (ref 7–25)
CALCIUM SPEC-SCNC: 10.1 MG/DL (ref 8.6–10.5)
CHLORIDE SERPL-SCNC: 102 MMOL/L (ref 98–107)
CHOLEST SERPL-MCNC: 110 MG/DL (ref 0–200)
CO2 SERPL-SCNC: 22.3 MMOL/L (ref 22–29)
CREAT SERPL-MCNC: 1.03 MG/DL (ref 0.57–1)
DEPRECATED RDW RBC AUTO: 41 FL (ref 37–54)
EGFRCR SERPLBLD CKD-EPI 2021: 59.7 ML/MIN/1.73
EOSINOPHIL # BLD AUTO: 0.11 10*3/MM3 (ref 0–0.4)
EOSINOPHIL NFR BLD AUTO: 0.7 % (ref 0.3–6.2)
ERYTHROCYTE [DISTWIDTH] IN BLOOD BY AUTOMATED COUNT: 12.6 % (ref 12.3–15.4)
GLOBULIN UR ELPH-MCNC: 3 GM/DL
GLUCOSE SERPL-MCNC: 87 MG/DL (ref 65–99)
HBA1C MFR BLD: 5.2 % (ref 4.8–5.6)
HCT VFR BLD AUTO: 40.3 % (ref 34–46.6)
HDLC SERPL-MCNC: 35 MG/DL (ref 40–60)
HGB BLD-MCNC: 13.5 G/DL (ref 12–15.9)
IMM GRANULOCYTES # BLD AUTO: 0.13 10*3/MM3 (ref 0–0.05)
IMM GRANULOCYTES NFR BLD AUTO: 0.9 % (ref 0–0.5)
LDLC SERPL CALC-MCNC: 56 MG/DL (ref 0–100)
LDLC/HDLC SERPL: 1.58 {RATIO}
LYMPHOCYTES # BLD AUTO: 2.12 10*3/MM3 (ref 0.7–3.1)
LYMPHOCYTES NFR BLD AUTO: 14 % (ref 19.6–45.3)
MAGNESIUM SERPL-MCNC: 1.9 MG/DL (ref 1.6–2.4)
MCH RBC QN AUTO: 30.3 PG (ref 26.6–33)
MCHC RBC AUTO-ENTMCNC: 33.5 G/DL (ref 31.5–35.7)
MCV RBC AUTO: 90.6 FL (ref 79–97)
MONOCYTES # BLD AUTO: 1.03 10*3/MM3 (ref 0.1–0.9)
MONOCYTES NFR BLD AUTO: 6.8 % (ref 5–12)
NEUTROPHILS NFR BLD AUTO: 11.7 10*3/MM3 (ref 1.7–7)
NEUTROPHILS NFR BLD AUTO: 77.3 % (ref 42.7–76)
NRBC BLD AUTO-RTO: 0 /100 WBC (ref 0–0.2)
PLATELET # BLD AUTO: 400 10*3/MM3 (ref 140–450)
PMV BLD AUTO: 11.9 FL (ref 6–12)
POTASSIUM SERPL-SCNC: 4.2 MMOL/L (ref 3.5–5.2)
PROT SERPL-MCNC: 7.1 G/DL (ref 6–8.5)
RBC # BLD AUTO: 4.45 10*6/MM3 (ref 3.77–5.28)
SODIUM SERPL-SCNC: 141 MMOL/L (ref 136–145)
TRIGL SERPL-MCNC: 99 MG/DL (ref 0–150)
TSH SERPL DL<=0.05 MIU/L-ACNC: 1.3 UIU/ML (ref 0.27–4.2)
VIT B12 BLD-MCNC: 1249 PG/ML (ref 211–946)
VLDLC SERPL-MCNC: 19 MG/DL (ref 5–40)
WBC NRBC COR # BLD AUTO: 15.14 10*3/MM3 (ref 3.4–10.8)

## 2024-05-02 PROCEDURE — 83735 ASSAY OF MAGNESIUM: CPT | Performed by: PREVENTIVE MEDICINE

## 2024-05-02 PROCEDURE — 80053 COMPREHEN METABOLIC PANEL: CPT | Performed by: PREVENTIVE MEDICINE

## 2024-05-02 PROCEDURE — 80061 LIPID PANEL: CPT | Performed by: PREVENTIVE MEDICINE

## 2024-05-02 PROCEDURE — 85025 COMPLETE CBC W/AUTO DIFF WBC: CPT | Performed by: PREVENTIVE MEDICINE

## 2024-05-02 PROCEDURE — 82607 VITAMIN B-12: CPT | Performed by: PREVENTIVE MEDICINE

## 2024-05-02 PROCEDURE — 84443 ASSAY THYROID STIM HORMONE: CPT | Performed by: PREVENTIVE MEDICINE

## 2024-05-02 PROCEDURE — 83036 HEMOGLOBIN GLYCOSYLATED A1C: CPT | Performed by: PREVENTIVE MEDICINE

## 2024-05-02 NOTE — PROGRESS NOTES
Venipuncture performed on Left Arm by Karol Maxwell MA  with good hemostasis. Patient tolerated well. 05/02/24

## 2024-05-03 ENCOUNTER — TELEPHONE (OUTPATIENT)
Dept: FAMILY MEDICINE CLINIC | Facility: CLINIC | Age: 69
End: 2024-05-03
Payer: MEDICARE

## 2024-05-03 NOTE — TELEPHONE ENCOUNTER
RELAY----- Message from Karol MALONE sent at 5/3/2024  7:52 AM EDT -----  Kidney function is lower limits of normal and has gone from 67-60 so avoid ibuprofen Aleve Motrin and limit x-ray dyes we will continue to monitor.  White blood cell count is again elevated if you have any signs of infection we should recheck you again finally vitamin B12 is elevated so you can cut your dose to 3 days/week call if any other questions or concerns

## 2024-05-03 NOTE — PROGRESS NOTES
Kidney function is lower limits of normal and has gone from 67-60 so avoid ibuprofen Aleve Motrin and limit x-ray dyes we will continue to monitor.  White blood cell count is again elevated if you have any signs of infection we should recheck you again finally vitamin B12 is elevated so you can cut your dose to 3 days/week call if any other questions or concerns

## 2024-05-06 ENCOUNTER — TELEPHONE (OUTPATIENT)
Dept: FAMILY MEDICINE CLINIC | Facility: CLINIC | Age: 69
End: 2024-05-06
Payer: MEDICARE

## 2024-05-08 ENCOUNTER — OFFICE VISIT (OUTPATIENT)
Dept: PAIN MEDICINE | Facility: CLINIC | Age: 69
End: 2024-05-08
Payer: MEDICARE

## 2024-05-08 VITALS
DIASTOLIC BLOOD PRESSURE: 57 MMHG | RESPIRATION RATE: 16 BRPM | HEART RATE: 82 BPM | BODY MASS INDEX: 40.3 KG/M2 | SYSTOLIC BLOOD PRESSURE: 113 MMHG | OXYGEN SATURATION: 94 % | WEIGHT: 231.1 LBS

## 2024-05-08 DIAGNOSIS — M96.1 POSTLAMINECTOMY SYNDROME OF LUMBAR REGION: Primary | ICD-10-CM

## 2024-05-08 DIAGNOSIS — G89.4 CHRONIC PAIN SYNDROME: ICD-10-CM

## 2024-05-08 DIAGNOSIS — M47.814 THORACIC SPONDYLOSIS WITHOUT MYELOPATHY: ICD-10-CM

## 2024-05-08 DIAGNOSIS — Z79.899 HIGH RISK MEDICATION USE: ICD-10-CM

## 2024-05-08 DIAGNOSIS — M25.50 POLYARTHRALGIA: ICD-10-CM

## 2024-05-08 RX ORDER — HYDROCODONE BITARTRATE AND ACETAMINOPHEN 7.5; 325 MG/1; MG/1
1 TABLET ORAL 3 TIMES DAILY PRN
Qty: 90 TABLET | Refills: 0 | Status: SHIPPED | OUTPATIENT
Start: 2024-06-14

## 2024-05-08 RX ORDER — HYDROCODONE BITARTRATE AND ACETAMINOPHEN 7.5; 325 MG/1; MG/1
1 TABLET ORAL 3 TIMES DAILY PRN
Qty: 90 TABLET | Refills: 0 | Status: SHIPPED | OUTPATIENT
Start: 2024-05-15

## 2024-05-25 RX ORDER — TIRZEPATIDE 7.5 MG/.5ML
INJECTION, SOLUTION SUBCUTANEOUS
Qty: 2 ML | Refills: 0 | Status: SHIPPED | OUTPATIENT
Start: 2024-05-25

## 2024-05-28 ENCOUNTER — PATIENT MESSAGE (OUTPATIENT)
Dept: PAIN MEDICINE | Facility: CLINIC | Age: 69
End: 2024-05-28
Payer: MEDICARE

## 2024-05-29 RX ORDER — CYCLOBENZAPRINE HCL 10 MG
10 TABLET ORAL 3 TIMES DAILY PRN
Qty: 90 TABLET | Refills: 11 | Status: SHIPPED | OUTPATIENT
Start: 2024-05-29

## 2024-06-26 ENCOUNTER — TELEPHONE (OUTPATIENT)
Dept: FAMILY MEDICINE CLINIC | Facility: CLINIC | Age: 69
End: 2024-06-26
Payer: MEDICARE

## 2024-06-26 NOTE — TELEPHONE ENCOUNTER
Caller: STEVEN QUINTANA/[facility]    Best call back number: [phone]*        What was the call regarding: STEVEN WITH [facility], CALLING STATING THAT THE [place] IS NOT COVERED UNDER THE PATIENT'S INSURANCE PLAN, AND IF THERE IS AN ALTERNATIVE THAT IS COVERED BY THE PATIENT'S INSURANCE. STEVEN ASK FOR DR. SAPNA OBRIEN TO CONTACT THE PATIENT REGARDING AN ALTERNATIVE.

## 2024-07-11 ENCOUNTER — OFFICE VISIT (OUTPATIENT)
Dept: PAIN MEDICINE | Facility: CLINIC | Age: 69
End: 2024-07-11
Payer: MEDICARE

## 2024-07-11 VITALS
WEIGHT: 227 LBS | DIASTOLIC BLOOD PRESSURE: 77 MMHG | BODY MASS INDEX: 39.58 KG/M2 | SYSTOLIC BLOOD PRESSURE: 124 MMHG | HEART RATE: 86 BPM | OXYGEN SATURATION: 96 % | RESPIRATION RATE: 16 BRPM

## 2024-07-11 DIAGNOSIS — G89.4 CHRONIC PAIN SYNDROME: ICD-10-CM

## 2024-07-11 DIAGNOSIS — Z79.899 HIGH RISK MEDICATION USE: Primary | ICD-10-CM

## 2024-07-11 RX ORDER — HYDROCODONE BITARTRATE AND ACETAMINOPHEN 7.5; 325 MG/1; MG/1
1 TABLET ORAL 3 TIMES DAILY PRN
Qty: 90 TABLET | Refills: 0 | Status: SHIPPED | OUTPATIENT
Start: 2024-09-11

## 2024-07-11 RX ORDER — HYDROCODONE BITARTRATE AND ACETAMINOPHEN 7.5; 325 MG/1; MG/1
1 TABLET ORAL 3 TIMES DAILY PRN
Qty: 90 TABLET | Refills: 0 | Status: SHIPPED | OUTPATIENT
Start: 2024-07-13

## 2024-07-11 RX ORDER — HYDROCODONE BITARTRATE AND ACETAMINOPHEN 7.5; 325 MG/1; MG/1
1 TABLET ORAL 3 TIMES DAILY PRN
Qty: 90 TABLET | Refills: 0 | Status: SHIPPED | OUTPATIENT
Start: 2024-08-12

## 2024-07-11 NOTE — PROGRESS NOTES
CC  hip/buttock, right leg, lower back pain, joints pain  69-year-old female with chronic back pain/postlaminectomy syndrome history of fusion L2 to to S1, polyarthralgia, S/P left TKA, here for follow-up.  Continued back pain, right SI pain.  Good relief with hydrocodone and denies side effects.  Chronic back pain radiating to bilateral lower extremity worse on the right with aching numbness in both legs worse with standing walking.  Back pain interfering with ADL. or activity.  Denies new weakness saddle anesthesia bladder bowel incontinence.    Pain interfering with daily activity and sleep.  Marginal relief with physical therapy or anti-inflammatories.  Marginal relief with caudal JESSICA in the past.  Declines LESI due to past experience with post dural puncture headache.  60% relief with transforaminal JESSICA.      Utilizes hydrocodone with good relief functional benefit and side effects.     L-spine and T-spine x-ray 2024: Multilevel mild to moderate thoracic spondylosis. Posterior and interbody fusion of the lumbar spine and sacrum without hardware complication.  Advanced degenerative disc disease at the L1-L2 level.posterior fusion of L2 through the sacroiliac joints with bilateral vertical rods and pedicle screws. Interbody disc spacers are present from the L2-L3 through the L5-S1 level. The alignment is normal     CT abdomen pelvis 2021: L2-S1 posterior spinal fusion hardware appears intact. L4 lobectomy with interbody strut is in place. Advanced degenerative disc endplate changes  are present at the L1-2 level. No acute osseous abnormalities are identified.  L-spine MRI 2016: Multilevel degenerative disc disease and degenerative facet change as well as  multilevel postoperative changes detailed above.  There is a 14 mm of anterolisthesis of L4 on L5 which has worsened from the prior exam where this was only 4 mm.  This results in severe bilateral neural foraminal narrowing but no spinal canal stenosis.    Pain  Assessment   Cause of Pain: surgery  Location of Pain: Lower Back, R Hip, L Hip, R Leg, L Leg  Description of Pain: Dull/Aching, Throbbing, Stabbing  Previous Pain Rating : 5  Current Pain Ratin  Aggravating Factors: Activity  Alleviating Factors: Rest, Medication  Previous Treatments: Epidural Steroids, Narcotic Pain Medication, Physical Therapy  Previous Diagnostic Studies: MRI   PEG Assessment   What number best describes your pain on average in the past week?7  What number best describes how, during the past week, pain has interfered with your enjoyment of life?5  What number best describes how, during the past week, pain has interfered with your general activity? 9     Review of Systems        See HPI      Vitals:    24 0835   BP: 124/77   Pulse: 86   Resp: 16   SpO2: 96%     Physical Exam  Vitals reviewed.   Constitutional:       General: She is not in acute distress.  Pulmonary:      Effort: Pulmonary effort is normal.   Musculoskeletal:      Lumbar back: Tenderness present. Positive right straight leg raise test.      Comments: Lumbar loading positive, pain on extension of low back past 5 degrees.  TTP on the lumbar facets noted.       PHQ 9 on chart                     opioid risk tool low risk      Assessment /Plan   Diagnoses and all orders for this visit:    1. Chronic pain syndrome  -     HYDROcodone-acetaminophen (NORCO) 7.5-325 MG per tablet; Take 1 tablet by mouth 3 (Three) Times a Day As Needed for Severe Pain. DNF before 2024  Dispense: 90 tablet; Refill: 0  -     HYDROcodone-acetaminophen (NORCO) 7.5-325 MG per tablet; Take 1 tablet by mouth 3 (Three) Times a Day As Needed for Severe Pain.  Dispense: 90 tablet; Refill: 0  -     HYDROcodone-acetaminophen (NORCO) 7.5-325 MG per tablet; Take 1 tablet by mouth 3 (Three) Times a Day As Needed for Severe Pain. DNF before 2024  Dispense: 90 tablet; Refill: 0      Summary   69-year-old female with chronic back pain, postlaminectomy  syndrome here for follow-up.    chronic pain from lumbar DDD spondylosis with radicular pain.  Chronic polyarthralgia.  Repeat right L4-L5 transforaminal JESSICA or SI injection as needed.    Continued back pain, right SI pain.  Good relief with hydrocodone and denies side effects.  Still considering stimulator but undecided    Continue hydrocodone 7.5/25 3 times daily.  UDS and inspect reviewed.  Discussed risk of tolerance, dependence, respiratory depression, coma and death associated with use of oral opioids for treatment of chronic nonmalignant pain.      Continue gabapentin/flexeril.  Cont. topical compounded neuropathic/anti-inflammatory cream with ketamine.      RTC 2-3 mo

## 2024-08-13 RX ORDER — LISINOPRIL AND HYDROCHLOROTHIAZIDE 25; 20 MG/1; MG/1
1 TABLET ORAL DAILY
Qty: 90 TABLET | Refills: 0 | Status: SHIPPED | OUTPATIENT
Start: 2024-08-13

## 2024-08-13 RX ORDER — SULINDAC 200 MG/1
200 TABLET ORAL 2 TIMES DAILY
Qty: 180 TABLET | Refills: 0 | Status: SHIPPED | OUTPATIENT
Start: 2024-08-13

## 2024-08-13 NOTE — TELEPHONE ENCOUNTER
----- Message from [facility] [employer] sent at 8/13/2024 12:36 PM EDT -----  Regarding: Refills  Contact: [phone]  I am needing a refill for my Sulindac 200 mg 1 twice a day and Lisinipril HCTZ 20/25 1 a day please. My mail in prescription now is with my [employer] insurance. If you have any questions just call me [phone] and thank you so much.

## 2024-08-27 RX ORDER — SERTRALINE HYDROCHLORIDE 25 MG/1
25 TABLET, FILM COATED ORAL DAILY
Qty: 90 TABLET | Refills: 1 | Status: SHIPPED | OUTPATIENT
Start: 2024-08-27

## 2024-10-10 ENCOUNTER — OFFICE VISIT (OUTPATIENT)
Dept: PAIN MEDICINE | Facility: CLINIC | Age: 69
End: 2024-10-10
Payer: MEDICARE

## 2024-10-10 VITALS
SYSTOLIC BLOOD PRESSURE: 135 MMHG | HEART RATE: 80 BPM | OXYGEN SATURATION: 95 % | WEIGHT: 221 LBS | DIASTOLIC BLOOD PRESSURE: 79 MMHG | RESPIRATION RATE: 16 BRPM | BODY MASS INDEX: 38.53 KG/M2

## 2024-10-10 DIAGNOSIS — M96.1 POSTLAMINECTOMY SYNDROME OF LUMBAR REGION: ICD-10-CM

## 2024-10-10 DIAGNOSIS — Z79.899 HIGH RISK MEDICATION USE: ICD-10-CM

## 2024-10-10 DIAGNOSIS — M47.814 THORACIC SPONDYLOSIS WITHOUT MYELOPATHY: ICD-10-CM

## 2024-10-10 DIAGNOSIS — G89.4 CHRONIC PAIN SYNDROME: Primary | ICD-10-CM

## 2024-10-10 DIAGNOSIS — M25.50 POLYARTHRALGIA: ICD-10-CM

## 2024-10-10 RX ORDER — HYDROCODONE BITARTRATE AND ACETAMINOPHEN 7.5; 325 MG/1; MG/1
1 TABLET ORAL 3 TIMES DAILY PRN
Qty: 90 TABLET | Refills: 0 | Status: SHIPPED | OUTPATIENT
Start: 2024-11-09

## 2024-10-10 RX ORDER — HYDROCODONE BITARTRATE AND ACETAMINOPHEN 7.5; 325 MG/1; MG/1
1 TABLET ORAL 3 TIMES DAILY PRN
Qty: 90 TABLET | Refills: 0 | Status: SHIPPED | OUTPATIENT
Start: 2024-12-09

## 2024-10-10 RX ORDER — HYDROCODONE BITARTRATE AND ACETAMINOPHEN 7.5; 325 MG/1; MG/1
1 TABLET ORAL 3 TIMES DAILY PRN
Qty: 90 TABLET | Refills: 0 | Status: SHIPPED | OUTPATIENT
Start: 2024-10-10

## 2024-10-10 NOTE — PROGRESS NOTES
CC  hip/buttock, right leg, lower back pain, joints pain  69-year-old female with chronic back pain/postlaminectomy syndrome history of fusion L2 to to S1, polyarthralgia, S/P left TKA, here for follow-up.  Continued chronic back pain radiating to bilateral lower extremity worse on the right with aching numbness in both legs worse with standing walking.  Back pain interfering with ADL. or activity.  Denies new weakness saddle anesthesia bladder bowel incontinence.    Pain interfering with daily activity and sleep.  Marginal relief with physical therapy or anti-inflammatories.  Marginal relief with caudal JESSICA in the past.  Declines LESI due to past experience with post dural puncture headache.  60% relief with transforaminal JESSICA.      Utilizes hydrocodone with good relief functional benefit and side effects.     L-spine and T-spine x-ray 2024: Multilevel mild to moderate thoracic spondylosis. Posterior and interbody fusion of the lumbar spine and sacrum without hardware complication.  Advanced degenerative disc disease at the L1-L2 level.posterior fusion of L2 through the sacroiliac joints with bilateral vertical rods and pedicle screws. Interbody disc spacers are present from the L2-L3 through the L5-S1 level. The alignment is normal     CT abdomen pelvis 2021: L2-S1 posterior spinal fusion hardware appears intact. L4 lobectomy with interbody strut is in place. Advanced degenerative disc endplate changes  are present at the L1-2 level. No acute osseous abnormalities are identified.  L-spine MRI 2016: Multilevel degenerative disc disease and degenerative facet change as well as  multilevel postoperative changes detailed above.  There is a 14 mm of anterolisthesis of L4 on L5 which has worsened from the prior exam where this was only 4 mm.  This results in severe bilateral neural foraminal narrowing but no spinal canal stenosis.    Pain Assessment   Cause of Pain: surgery  Location of Pain: Lower Back, R Hip, L Hip, R  Leg, L Leg  Description of Pain: Dull/Aching, Throbbing, Stabbing  Previous Pain Rating : 5  Current Pain Ratin  Aggravating Factors: Activity  Alleviating Factors: Rest, Medication  Previous Treatments: Epidural Steroids, Narcotic Pain Medication, Physical Therapy  Previous Diagnostic Studies: MRI   PEG Assessment   What number best describes your pain on average in the past week?7  What number best describes how, during the past week, pain has interfered with your enjoyment of life?5  What number best describes how, during the past week, pain has interfered with your general activity? 8     Review of Systems        See HPI      Vitals:    10/10/24 0829   BP: 135/79   Pulse: 80   Resp: 16   SpO2: 95%     Physical Exam  Vitals reviewed.   Constitutional:       General: She is not in acute distress.  Pulmonary:      Effort: Pulmonary effort is normal.   Musculoskeletal:      Lumbar back: Tenderness present. Positive right straight leg raise test.      Comments: Lumbar loading positive, pain on extension of low back past 5 degrees.  TTP on the lumbar facets noted.       PHQ 9 on chart                     opioid risk tool low risk      Assessment /Plan   Diagnoses and all orders for this visit:    1. Chronic pain syndrome (Primary)  -     HYDROcodone-acetaminophen (NORCO) 7.5-325 MG per tablet; Take 1 tablet by mouth 3 (Three) Times a Day As Needed for Severe Pain.  Dispense: 90 tablet; Refill: 0  -     HYDROcodone-acetaminophen (NORCO) 7.5-325 MG per tablet; Take 1 tablet by mouth 3 (Three) Times a Day As Needed for Severe Pain. DNF before 2024  Dispense: 90 tablet; Refill: 0  -     HYDROcodone-acetaminophen (NORCO) 7.5-325 MG per tablet; Take 1 tablet by mouth 3 (Three) Times a Day As Needed for Severe Pain. DNF before 2024  Dispense: 90 tablet; Refill: 0    2. Postlaminectomy syndrome of lumbar region    3. Thoracic spondylosis without myelopathy    4. Polyarthralgia    5. High risk medication  use      Summary   69-year-old female with chronic back pain, postlaminectomy syndrome here for follow-up.    chronic pain from lumbar DDD spondylosis with radicular pain.  Chronic polyarthralgia.  Repeat right L4-L5 transforaminal JESSICA or SI injection as needed.    Continue hydrocodone 7.5/25 3 times daily.  UDS and inspect reviewed.  Discussed risk of tolerance, dependence, respiratory depression, coma and death associated with use of oral opioids for treatment of chronic nonmalignant pain.      Continue gabapentin/flexeril.  Cont. topical compounded neuropathic/anti-inflammatory cream with ketamine.      RTC 2-3 mo

## 2024-10-15 RX ORDER — LISINOPRIL AND HYDROCHLOROTHIAZIDE 20; 25 MG/1; MG/1
1 TABLET ORAL DAILY
Qty: 90 TABLET | Refills: 0 | Status: SHIPPED | OUTPATIENT
Start: 2024-10-15

## 2024-10-15 RX ORDER — SULINDAC 200 MG/1
200 TABLET ORAL 2 TIMES DAILY
Qty: 180 TABLET | Refills: 0 | Status: SHIPPED | OUTPATIENT
Start: 2024-10-15

## 2024-11-01 NOTE — PATIENT INSTRUCTIONS
You are due for adacel Tdap vaccination.   Health Maintenance Due   Topic Date Due    TDAP/TD VACCINES (1 - Tdap) Never done    ZOSTER VACCINE (1 of 2) Never done    DXA SCAN  12/22/2024    (provides protection against tetanus, diptheria and whooping cough) Please  get the immunization at your local pharmacy at your earliest convenience.  Please click on the link for more information about this vaccine.     Please call around thanksgiving about mood.   https://www.cdc.gov/vaccines/vpd/dtap-tdap-td/public/index.html    You are due for Shingrix vaccination series ( the newest shingles vaccine).  It is a two shot series spaced 2-6 months apart. Please get this vaccine series started at your earliest convenience at your local pharmacy to help avoid shingles outbreak. It is more effective than the old Zostavax vaccine and is recommended even if you have had the Zostavax vaccine in the past.  Once the Shingrix series is completed, it does not need to be repeated.   For more information, please look at the website below:  https://www.cdc.gov/vaccines/vpd/shingles/public/shingrix/index.html

## 2024-11-04 ENCOUNTER — LAB (OUTPATIENT)
Dept: FAMILY MEDICINE CLINIC | Facility: CLINIC | Age: 69
End: 2024-11-04
Payer: MEDICARE

## 2024-11-04 ENCOUNTER — OFFICE VISIT (OUTPATIENT)
Dept: FAMILY MEDICINE CLINIC | Facility: CLINIC | Age: 69
End: 2024-11-04
Payer: MEDICARE

## 2024-11-04 VITALS
SYSTOLIC BLOOD PRESSURE: 107 MMHG | HEIGHT: 64 IN | HEART RATE: 90 BPM | TEMPERATURE: 97 F | DIASTOLIC BLOOD PRESSURE: 72 MMHG | OXYGEN SATURATION: 98 % | WEIGHT: 220 LBS | BODY MASS INDEX: 37.56 KG/M2

## 2024-11-04 DIAGNOSIS — R35.0 URINARY FREQUENCY: ICD-10-CM

## 2024-11-04 DIAGNOSIS — R73.9 HYPERGLYCEMIA: ICD-10-CM

## 2024-11-04 DIAGNOSIS — I10 PRIMARY HYPERTENSION: ICD-10-CM

## 2024-11-04 DIAGNOSIS — E66.01 CLASS 2 SEVERE OBESITY DUE TO EXCESS CALORIES WITH SERIOUS COMORBIDITY AND BODY MASS INDEX (BMI) OF 38.0 TO 38.9 IN ADULT: ICD-10-CM

## 2024-11-04 DIAGNOSIS — E78.5 HYPERLIPIDEMIA, UNSPECIFIED HYPERLIPIDEMIA TYPE: ICD-10-CM

## 2024-11-04 DIAGNOSIS — R23.2 HOT FLASHES: ICD-10-CM

## 2024-11-04 DIAGNOSIS — I10 PRIMARY HYPERTENSION: Primary | ICD-10-CM

## 2024-11-04 DIAGNOSIS — E55.9 VITAMIN D DEFICIENCY: ICD-10-CM

## 2024-11-04 DIAGNOSIS — E53.8 B12 DEFICIENCY: ICD-10-CM

## 2024-11-04 DIAGNOSIS — F32.A DEPRESSION, UNSPECIFIED DEPRESSION TYPE: ICD-10-CM

## 2024-11-04 DIAGNOSIS — M81.0 AGE RELATED OSTEOPOROSIS, UNSPECIFIED PATHOLOGICAL FRACTURE PRESENCE: ICD-10-CM

## 2024-11-04 DIAGNOSIS — Z78.0 POSTMENOPAUSE: ICD-10-CM

## 2024-11-04 DIAGNOSIS — J47.9: ICD-10-CM

## 2024-11-04 DIAGNOSIS — E66.812 CLASS 2 SEVERE OBESITY DUE TO EXCESS CALORIES WITH SERIOUS COMORBIDITY AND BODY MASS INDEX (BMI) OF 38.0 TO 38.9 IN ADULT: ICD-10-CM

## 2024-11-04 LAB
25(OH)D3 SERPL-MCNC: 28.6 NG/ML (ref 30–100)
ALBUMIN SERPL-MCNC: 4.5 G/DL (ref 3.5–5.2)
ALBUMIN/GLOB SERPL: 1.7 G/DL
ALP SERPL-CCNC: 90 U/L (ref 39–117)
ALT SERPL W P-5'-P-CCNC: 25 U/L (ref 1–33)
ANION GAP SERPL CALCULATED.3IONS-SCNC: 15 MMOL/L (ref 5–15)
AST SERPL-CCNC: 16 U/L (ref 1–32)
BACTERIA UR QL AUTO: ABNORMAL /HPF
BASOPHILS # BLD AUTO: 0.05 10*3/MM3 (ref 0–0.2)
BASOPHILS NFR BLD AUTO: 0.4 % (ref 0–1.5)
BILIRUB SERPL-MCNC: 0.5 MG/DL (ref 0–1.2)
BILIRUB UR QL STRIP: NEGATIVE
BUN SERPL-MCNC: 13 MG/DL (ref 8–23)
BUN/CREAT SERPL: 13.4 (ref 7–25)
CALCIUM SPEC-SCNC: 10.1 MG/DL (ref 8.6–10.5)
CHLORIDE SERPL-SCNC: 101 MMOL/L (ref 98–107)
CHOLEST SERPL-MCNC: 123 MG/DL (ref 0–200)
CLARITY UR: ABNORMAL
CO2 SERPL-SCNC: 27 MMOL/L (ref 22–29)
COLOR UR: YELLOW
CREAT SERPL-MCNC: 0.97 MG/DL (ref 0.57–1)
DEPRECATED RDW RBC AUTO: 43.4 FL (ref 37–54)
EGFRCR SERPLBLD CKD-EPI 2021: 64.2 ML/MIN/1.73
EOSINOPHIL # BLD AUTO: 0.07 10*3/MM3 (ref 0–0.4)
EOSINOPHIL NFR BLD AUTO: 0.6 % (ref 0.3–6.2)
ERYTHROCYTE [DISTWIDTH] IN BLOOD BY AUTOMATED COUNT: 12.6 % (ref 12.3–15.4)
GLOBULIN UR ELPH-MCNC: 2.6 GM/DL
GLUCOSE SERPL-MCNC: 76 MG/DL (ref 65–99)
GLUCOSE UR STRIP-MCNC: NEGATIVE MG/DL
HBA1C MFR BLD: 4.8 % (ref 4.8–5.6)
HCT VFR BLD AUTO: 42.7 % (ref 34–46.6)
HDLC SERPL-MCNC: 35 MG/DL (ref 40–60)
HGB BLD-MCNC: 14.5 G/DL (ref 12–15.9)
HGB UR QL STRIP.AUTO: NEGATIVE
HOLD SPECIMEN: NORMAL
HYALINE CASTS UR QL AUTO: ABNORMAL /LPF
IMM GRANULOCYTES # BLD AUTO: 0.07 10*3/MM3 (ref 0–0.05)
IMM GRANULOCYTES NFR BLD AUTO: 0.6 % (ref 0–0.5)
KETONES UR QL STRIP: ABNORMAL
LDLC SERPL CALC-MCNC: 63 MG/DL (ref 0–100)
LDLC/HDLC SERPL: 1.71 {RATIO}
LEUKOCYTE ESTERASE UR QL STRIP.AUTO: ABNORMAL
LYMPHOCYTES # BLD AUTO: 2.47 10*3/MM3 (ref 0.7–3.1)
LYMPHOCYTES NFR BLD AUTO: 19.6 % (ref 19.6–45.3)
MAGNESIUM SERPL-MCNC: 2.5 MG/DL (ref 1.6–2.4)
MCH RBC QN AUTO: 32.3 PG (ref 26.6–33)
MCHC RBC AUTO-ENTMCNC: 34 G/DL (ref 31.5–35.7)
MCV RBC AUTO: 95.1 FL (ref 79–97)
MONOCYTES # BLD AUTO: 0.89 10*3/MM3 (ref 0.1–0.9)
MONOCYTES NFR BLD AUTO: 7 % (ref 5–12)
NEUTROPHILS NFR BLD AUTO: 71.8 % (ref 42.7–76)
NEUTROPHILS NFR BLD AUTO: 9.08 10*3/MM3 (ref 1.7–7)
NITRITE UR QL STRIP: POSITIVE
NRBC BLD AUTO-RTO: 0 /100 WBC (ref 0–0.2)
PH UR STRIP.AUTO: 6.5 [PH] (ref 5–8)
PLATELET # BLD AUTO: 444 10*3/MM3 (ref 140–450)
PMV BLD AUTO: 11.2 FL (ref 6–12)
POTASSIUM SERPL-SCNC: 4.1 MMOL/L (ref 3.5–5.2)
PROT SERPL-MCNC: 7.1 G/DL (ref 6–8.5)
PROT UR QL STRIP: ABNORMAL
RBC # BLD AUTO: 4.49 10*6/MM3 (ref 3.77–5.28)
RBC # UR STRIP: ABNORMAL /HPF
REF LAB TEST METHOD: ABNORMAL
SODIUM SERPL-SCNC: 143 MMOL/L (ref 136–145)
SP GR UR STRIP: 1.02 (ref 1–1.03)
SQUAMOUS #/AREA URNS HPF: ABNORMAL /HPF
TRIGL SERPL-MCNC: 140 MG/DL (ref 0–150)
TSH SERPL DL<=0.05 MIU/L-ACNC: 1.33 UIU/ML (ref 0.27–4.2)
UROBILINOGEN UR QL STRIP: ABNORMAL
VIT B12 BLD-MCNC: 1992 PG/ML (ref 211–946)
VLDLC SERPL-MCNC: 25 MG/DL (ref 5–40)
WBC # UR STRIP: ABNORMAL /HPF
WBC NRBC COR # BLD AUTO: 12.63 10*3/MM3 (ref 3.4–10.8)

## 2024-11-04 PROCEDURE — 87086 URINE CULTURE/COLONY COUNT: CPT | Performed by: PREVENTIVE MEDICINE

## 2024-11-04 PROCEDURE — 3074F SYST BP LT 130 MM HG: CPT | Performed by: PREVENTIVE MEDICINE

## 2024-11-04 PROCEDURE — 82306 VITAMIN D 25 HYDROXY: CPT | Performed by: PREVENTIVE MEDICINE

## 2024-11-04 PROCEDURE — 85025 COMPLETE CBC W/AUTO DIFF WBC: CPT | Performed by: PREVENTIVE MEDICINE

## 2024-11-04 PROCEDURE — 36415 COLL VENOUS BLD VENIPUNCTURE: CPT

## 2024-11-04 PROCEDURE — 83036 HEMOGLOBIN GLYCOSYLATED A1C: CPT | Performed by: PREVENTIVE MEDICINE

## 2024-11-04 PROCEDURE — 99214 OFFICE O/P EST MOD 30 MIN: CPT | Performed by: PREVENTIVE MEDICINE

## 2024-11-04 PROCEDURE — 3078F DIAST BP <80 MM HG: CPT | Performed by: PREVENTIVE MEDICINE

## 2024-11-04 PROCEDURE — 82607 VITAMIN B-12: CPT | Performed by: PREVENTIVE MEDICINE

## 2024-11-04 PROCEDURE — 80053 COMPREHEN METABOLIC PANEL: CPT | Performed by: PREVENTIVE MEDICINE

## 2024-11-04 PROCEDURE — 87088 URINE BACTERIA CULTURE: CPT | Performed by: PREVENTIVE MEDICINE

## 2024-11-04 PROCEDURE — 80061 LIPID PANEL: CPT | Performed by: PREVENTIVE MEDICINE

## 2024-11-04 PROCEDURE — G2211 COMPLEX E/M VISIT ADD ON: HCPCS | Performed by: PREVENTIVE MEDICINE

## 2024-11-04 PROCEDURE — 1126F AMNT PAIN NOTED NONE PRSNT: CPT | Performed by: PREVENTIVE MEDICINE

## 2024-11-04 PROCEDURE — 81001 URINALYSIS AUTO W/SCOPE: CPT | Performed by: PREVENTIVE MEDICINE

## 2024-11-04 PROCEDURE — 84443 ASSAY THYROID STIM HORMONE: CPT | Performed by: PREVENTIVE MEDICINE

## 2024-11-04 PROCEDURE — 83735 ASSAY OF MAGNESIUM: CPT | Performed by: PREVENTIVE MEDICINE

## 2024-11-04 PROCEDURE — 87186 SC STD MICRODIL/AGAR DIL: CPT

## 2024-11-04 NOTE — PROGRESS NOTES
Subjective   Sherrie Chand is a 69 y.o. female presents for   Chief Complaint   Patient presents with    Hypertension     Patient is fasting    Hyperlipidemia       Health Maintenance Due   Topic Date Due    TDAP/TD VACCINES (1 - Tdap) Never done    ZOSTER VACCINE (1 of 2) Never done    DXA SCAN  12/22/2024       Hypertension    Hyperlipidemia       History of Present Illness  The patient is a 69-year-old female here today for a follow-up on primary hypertension, hyperlipidemia, hyperglycemia, disease of the bronchial airway, depression, class 2 severe obesity due to excess calories with serious comorbidities and a body mass index of 38, B12 deficiency, hot flashes, postmenopausal vitamin D deficiency, and age-related osteoporosis.    She reports no changes in her hearing or vision since her last visit. She has seen her eye doctor and dentist within the past year. She has no difficulty swallowing or digesting food. She reports no coughing up blood or sputum, experiencing headaches, or wheezing. She also reports no chest pressure or heart flutters. She performs self-breast exams monthly and reports no tenderness.    She has no issues with bowel movements or urination and reports no presence of blood in either. However, she suspects a urinary tract infection due to increased frequency of urination.    She has been making efforts to lose weight and believes her medication is assisting in this process. She is mindful of her saturated fat and carbohydrate intake. She reports no bronchial issues.    Her depression remains stable. She is currently taking sertraline for her depression, which she finds helpful, but notes that she has been feeling less motivated to get up from the couch in the past month. She reports no suicidal or homicidal thoughts.    She is taking B12 supplements but has reduced the dosage as per previous advice. She is not currently experiencing hot flashes. She makes an effort to walk for 20 minutes  "daily. She is also taking vitamin D supplements. She is unsure if she is taking any medication for her osteoporosis.    She is fasting today for lab tests.    ALLERGIES  She is allergic to SOMA. She gets itching and her ears start itching.    IMMUNIZATIONS  She had her COVID-19 vaccine on 08/30/2024. She had her influenza vaccine at the same time. She had her Prevnar 20 pneumonia vaccine in 05/2024.    Vitals:    11/04/24 1024 11/04/24 1027 11/04/24 1033   BP: 106/76 111/76 107/72   BP Location: Left arm Right arm Right arm   Patient Position: Sitting Standing Sitting   Cuff Size: Adult Adult Adult   Pulse: 92 91 90   Temp: 97 °F (36.1 °C)     TempSrc: Temporal     SpO2: 98%     Weight: 99.8 kg (220 lb)     Height: 161.3 cm (63.5\")       Body mass index is 38.36 kg/m².    Current Outpatient Medications on File Prior to Visit   Medication Sig Dispense Refill    atorvastatin (LIPITOR) 40 MG tablet TAKE 1 TABLET EVERY NIGHT 90 tablet 3    cyclobenzaprine (FLEXERIL) 10 MG tablet Take 1 tablet by mouth 3 (Three) Times a Day As Needed for Muscle Spasms. 90 tablet 11    gabapentin (NEURONTIN) 800 MG tablet Take 1 tablet by mouth 3 (Three) Times a Day. 270 tablet 3    HYDROcodone-acetaminophen (NORCO) 7.5-325 MG per tablet Take 1 tablet by mouth 3 (Three) Times a Day As Needed for Severe Pain. 90 tablet 0    [START ON 11/9/2024] HYDROcodone-acetaminophen (NORCO) 7.5-325 MG per tablet Take 1 tablet by mouth 3 (Three) Times a Day As Needed for Severe Pain. DNF before 11/9/2024 90 tablet 0    [START ON 12/9/2024] HYDROcodone-acetaminophen (NORCO) 7.5-325 MG per tablet Take 1 tablet by mouth 3 (Three) Times a Day As Needed for Severe Pain. DNF before 12/9/2024 90 tablet 0    lisinopril-hydrochlorothiazide (PRINZIDE,ZESTORETIC) 20-25 MG per tablet TAKE 1 TABLET BY MOUTH DAILY 90 tablet 0    Misc. Devices (Bariatric Rollator) misc 1 each Daily. Indications: back pain and needs seat to rest forwalking. 1 each 0    Mounjaro 7.5 " MG/0.5ML solution pen-injector pen INJECT 7.5 MG UNDER THE SKIN ONCE A WEEK 2 mL 0    St Johns Wort 150 MG capsule Take 1 capsule by mouth Daily.      sulindac (CLINORIL) 200 MG tablet TAKE 1 TABLET BY MOUTH TWICE  DAILY 180 tablet 0    vitamin B-6 (PYRIDOXINE) 100 MG tablet Take 3 tablets by mouth Daily.      [DISCONTINUED] sertraline (ZOLOFT) 25 MG tablet TAKE 1 TABLET BY MOUTH DAILY 90 tablet 1     No current facility-administered medications on file prior to visit.       The following portions of the patient's history were reviewed and updated as appropriate: allergies, current medications, past family history, past medical history, past social history, past surgical history, and problem list.    Review of Systems   Endocrine: Negative for heat intolerance.   Genitourinary:  Negative for frequency.   Psychiatric/Behavioral:  Positive for depressed mood.        Objective   Physical Exam  Vitals reviewed.   Constitutional:       General: She is not in acute distress.     Appearance: She is well-developed. She is obese. She is not ill-appearing or toxic-appearing.   HENT:      Head: Normocephalic and atraumatic.      Right Ear: Tympanic membrane, ear canal and external ear normal.      Left Ear: Tympanic membrane, ear canal and external ear normal.      Nose: Nose normal.      Mouth/Throat:      Mouth: Mucous membranes are moist.      Pharynx: No posterior oropharyngeal erythema.   Eyes:      Extraocular Movements: Extraocular movements intact.      Conjunctiva/sclera: Conjunctivae normal.      Pupils: Pupils are equal, round, and reactive to light.   Neck:      Vascular: No carotid bruit.   Cardiovascular:      Rate and Rhythm: Normal rate and regular rhythm.      Heart sounds: Normal heart sounds.   Pulmonary:      Effort: Pulmonary effort is normal.      Comments: Decreased breath sounds bilaterally  Abdominal:      General: Bowel sounds are normal. There is no distension.      Palpations: Abdomen is soft. There  is no mass.      Tenderness: There is no abdominal tenderness. There is no right CVA tenderness or left CVA tenderness.   Musculoskeletal:         General: Normal range of motion.      Cervical back: Neck supple. No tenderness.      Right lower leg: No edema.      Left lower leg: No edema.   Lymphadenopathy:      Cervical: No cervical adenopathy.   Skin:     General: Skin is warm.   Neurological:      General: No focal deficit present.      Mental Status: She is alert and oriented to person, place, and time.   Psychiatric:         Mood and Affect: Mood normal.         Behavior: Behavior normal.       Physical Exam  Eardrums appear normal.  Carotid arteries sound normal.  Lungs sound normal.  Heart rate and rhythm are normal.    Vital Signs  Blood pressure readings were 106/76, 111/76, and 107/72 when standing.    PHQ-9 Total Score:    Results  Laboratory Studies  White blood cell count was 15 six months ago.         Assessment & Plan   Diagnoses and all orders for this visit:    1. Primary hypertension (Primary)  -     CBC Auto Differential; Future  -     Comprehensive Metabolic Panel; Future    2. Postmenopause  -     DEXA Bone Density Axial; Future    3. Vitamin D deficiency  -     Vitamin D,25-Hydroxy; Future    4. Age related osteoporosis, unspecified pathological fracture presence    5. Hyperlipidemia, unspecified hyperlipidemia type  -     Lipid Panel; Future    6. Hyperglycemia  -     Hemoglobin A1c; Future    7. Disease of bronchial airway    8. Depression, unspecified depression type  -     Magnesium; Future  -     TSH Rfx On Abnormal To Free T4; Future    9. Class 2 severe obesity due to excess calories with serious comorbidity and body mass index (BMI) of 38.0 to 38.9 in adult    10. B12 deficiency  -     Vitamin B12; Future    11. Hot flashes    12. Urinary frequency  -     Urinalysis With Culture If Indicated - Urine, Clean Catch; Future    Other orders  -     sertraline (Zoloft) 50 MG tablet; Take 1  tablet by mouth Daily.  Dispense: 90 tablet; Refill: 1      Assessment & Plan  1. Primary Hypertension.  Her blood pressure readings today were satisfactory at 106/76, 111/76, and 107/72 while standing. She should continue monitoring her blood pressure regularly.    2. Hyperlipidemia.  She is advised to continue watching her saturated fats and carbohydrates intake.    3. Hyperglycemia.  She should continue monitoring her blood sugar levels and maintain a balanced diet.    4. Disease of the Bronchial Airway.  Her breathing sounded good today with no reported bronchial problems. She should continue monitoring for any respiratory symptoms.    5. Depression.  Her depression symptoms have not improved significantly. The sertraline dosage will be increased to 50 mg, and the 25 mg dosage will be discontinued. A prescription for 90 tablets with a refill will be sent to [facility] in [place]. She is advised to contact around [facility] to discuss her mood.    6. Severe Obesity.  Her weight has slightly decreased. She should continue her efforts to lose weight and maintain a healthy diet and exercise regimen.    7. B12 Deficiency.  She is currently taking B12 supplements and has cut back on the dosage as advised previously.    8. Postmenopausal Vitamin D Deficiency.  She is taking vitamin D supplements and should continue doing so.    9. Age-related Osteoporosis.  Her last DEXA scan in 2022 showed low normal results. A repeat DEXA scan will be ordered for 12/26/2024 or later at [facility]. She is advised to continue walking 20 minutes a day and consider medication if osteoporosis is confirmed.    10. Urinary Tract Infection (suspected).  She reported symptoms of urinary frequency. A urinalysis will be conducted today, and a culture will be performed if necessary.    11. Health Maintenance.  She is current with all her vaccinations, including COVID-19 on 08/30/2024, flu vaccine, and Prevnar 20 in May 2024. She is too young  for the RSV shot.    Follow-up  Return in 6 months for follow up.    Patient Instructions   You are due for adacel Tdap vaccination.   Health Maintenance Due   Topic Date Due    TDAP/TD VACCINES (1 - Tdap) Never done    ZOSTER VACCINE (1 of 2) Never done    DXA SCAN  12/22/2024    (provides protection against tetanus, diptheria and whooping cough) Please  get the immunization at your local pharmacy at your earliest convenience.  Please click on the link for more information about this vaccine.     Please call around thanksgiving about mood.   https://www.cdc.gov/vaccines/vpd/dtap-tdap-td/public/index.html    You are due for Shingrix vaccination series ( the newest shingles vaccine).  It is a two shot series spaced 2-6 months apart. Please get this vaccine series started at your earliest convenience at your local pharmacy to help avoid shingles outbreak. It is more effective than the old Zostavax vaccine and is recommended even if you have had the Zostavax vaccine in the past.  Once the Shingrix series is completed, it does not need to be repeated.   For more information, please look at the website below:  https://www.cdc.gov/vaccines/vpd/shingles/public/shingrix/index.html         Patient or patient representative verbalized consent for the use of Ambient Listening during the visit with  Ban Alexander MD for chart documentation. 11/4/2024  17:09 EST

## 2024-11-06 LAB — BACTERIA SPEC AEROBE CULT: ABNORMAL

## 2024-11-06 RX ORDER — NITROFURANTOIN 25; 75 MG/1; MG/1
100 CAPSULE ORAL 2 TIMES DAILY
Qty: 10 CAPSULE | Refills: 0 | Status: SHIPPED | OUTPATIENT
Start: 2024-11-06

## 2024-11-07 ENCOUNTER — TELEPHONE (OUTPATIENT)
Dept: FAMILY MEDICINE CLINIC | Facility: CLINIC | Age: 69
End: 2024-11-07
Payer: MEDICARE

## 2024-11-12 ENCOUNTER — PATIENT MESSAGE (OUTPATIENT)
Dept: PAIN MEDICINE | Facility: CLINIC | Age: 69
End: 2024-11-12
Payer: MEDICARE

## 2024-11-12 DIAGNOSIS — G89.4 CHRONIC PAIN SYNDROME: ICD-10-CM

## 2024-11-12 RX ORDER — GABAPENTIN 800 MG/1
800 TABLET ORAL 3 TIMES DAILY
Qty: 270 TABLET | Refills: 3 | Status: SHIPPED | OUTPATIENT
Start: 2024-11-12

## 2024-12-30 ENCOUNTER — PATIENT MESSAGE (OUTPATIENT)
Dept: FAMILY MEDICINE CLINIC | Facility: CLINIC | Age: 69
End: 2024-12-30
Payer: MEDICARE

## 2024-12-30 DIAGNOSIS — Z12.31 ENCOUNTER FOR SCREENING MAMMOGRAM FOR MALIGNANT NEOPLASM OF BREAST: Primary | ICD-10-CM

## 2025-01-08 ENCOUNTER — OFFICE VISIT (OUTPATIENT)
Dept: PAIN MEDICINE | Facility: CLINIC | Age: 70
End: 2025-01-08
Payer: MEDICARE

## 2025-01-08 ENCOUNTER — TELEPHONE (OUTPATIENT)
Dept: FAMILY MEDICINE CLINIC | Facility: CLINIC | Age: 70
End: 2025-01-08
Payer: MEDICARE

## 2025-01-08 VITALS
DIASTOLIC BLOOD PRESSURE: 64 MMHG | SYSTOLIC BLOOD PRESSURE: 112 MMHG | BODY MASS INDEX: 38.19 KG/M2 | WEIGHT: 219 LBS | RESPIRATION RATE: 16 BRPM | HEART RATE: 113 BPM | OXYGEN SATURATION: 97 %

## 2025-01-08 DIAGNOSIS — Z79.899 HIGH RISK MEDICATION USE: ICD-10-CM

## 2025-01-08 DIAGNOSIS — G89.4 CHRONIC PAIN SYNDROME: ICD-10-CM

## 2025-01-08 DIAGNOSIS — M47.814 THORACIC SPONDYLOSIS WITHOUT MYELOPATHY: ICD-10-CM

## 2025-01-08 DIAGNOSIS — Z79.899 HIGH RISK MEDICATION USE: Primary | ICD-10-CM

## 2025-01-08 DIAGNOSIS — M25.50 POLYARTHRALGIA: ICD-10-CM

## 2025-01-08 DIAGNOSIS — M96.1 POSTLAMINECTOMY SYNDROME OF LUMBAR REGION: Primary | ICD-10-CM

## 2025-01-08 RX ORDER — HYDROCODONE BITARTRATE AND ACETAMINOPHEN 7.5; 325 MG/1; MG/1
1 TABLET ORAL 3 TIMES DAILY PRN
Qty: 90 TABLET | Refills: 0 | Status: SHIPPED | OUTPATIENT
Start: 2025-03-07

## 2025-01-08 RX ORDER — HYDROCODONE BITARTRATE AND ACETAMINOPHEN 7.5; 325 MG/1; MG/1
1 TABLET ORAL 3 TIMES DAILY PRN
Qty: 90 TABLET | Refills: 0 | Status: SHIPPED | OUTPATIENT
Start: 2025-01-08

## 2025-01-08 RX ORDER — HYDROCODONE BITARTRATE AND ACETAMINOPHEN 7.5; 325 MG/1; MG/1
1 TABLET ORAL 3 TIMES DAILY PRN
Qty: 90 TABLET | Refills: 0 | Status: SHIPPED | OUTPATIENT
Start: 2025-02-07

## 2025-01-08 NOTE — PROGRESS NOTES
CC  hip/buttock, right leg, lower back pain, joints pain  69-year-old female with chronic back pain/postlaminectomy syndrome history of fusion L2 to to S1, polyarthralgia, S/P left TKA, here for follow-up.  Seen in ER at [facility] yesterday for chest pain, ruled out cardiac etiology.  Treating for dyspepsia.  Was given morphine IV.  Otherwise denies any new issues or change in chronic pain symptoms.  Continues to have good relief of hydrocodone without side effects.  Chronic back pain radiating to bilateral lower extremity worse on the right with aching numbness in both legs worse with standing walking.  Back pain interfering with ADL. or activity.  Denies new weakness saddle anesthesia bladder bowel incontinence.    Pain interfering with daily activity and sleep.  Marginal relief with physical therapy or anti-inflammatories.  Marginal relief with caudal JESSICA in the past.  Declines LESI due to past experience with post dural puncture headache.  60% relief with transforaminal JESSICA.      Utilizes hydrocodone with good relief functional benefit and side effects.     L-spine and T-spine x-ray 2024: Multilevel mild to moderate thoracic spondylosis. Posterior and interbody fusion of the lumbar spine and sacrum without hardware complication.  Advanced degenerative disc disease at the L1-L2 level.posterior fusion of L2 through the sacroiliac joints with bilateral vertical rods and pedicle screws. Interbody disc spacers are present from the L2-L3 through the L5-S1 level. The alignment is normal     CT abdomen pelvis 2021: L2-S1 posterior spinal fusion hardware appears intact. L4 lobectomy with interbody strut is in place. Advanced degenerative disc endplate changes  are present at the L1-2 level. No acute osseous abnormalities are identified.  L-spine MRI 2016: Multilevel degenerative disc disease and degenerative facet change as well as  multilevel postoperative changes detailed above.  There is a 14 mm of anterolisthesis  of L4 on L5 which has worsened from the prior exam where this was only 4 mm.  This results in severe bilateral neural foraminal narrowing but no spinal canal stenosis.    Pain Assessment   Cause of Pain: surgery  Location of Pain: Lower Back, R Hip, L Hip, R Leg, L Leg  Description of Pain: Dull/Aching, Throbbing, Stabbing  Previous Pain Rating : 6  Current Pain Ratin  Aggravating Factors: Activity  Alleviating Factors: Rest, Medication  Previous Treatments: Epidural Steroids, Narcotic Pain Medication, Physical Therapy  Previous Diagnostic Studies: MRI   PEG Assessment   What number best describes your pain on average in the past week?7  What number best describes how, during the past week, pain has interfered with your enjoyment of life?5  What number best describes how, during the past week, pain has interfered with your general activity? 8     Review of Systems        See HPI      Vitals:    25 0842   BP: 112/64   Pulse: 113   Resp: 16   SpO2: 97%     Physical Exam  Vitals reviewed.   Constitutional:       General: She is not in acute distress.  Pulmonary:      Effort: Pulmonary effort is normal.   Musculoskeletal:      Lumbar back: Tenderness present. Positive right straight leg raise test.      Comments: Lumbar loading positive, pain on extension of low back past 5 degrees.  TTP on the lumbar facets noted.       PHQ 9 on chart                     opioid risk tool low risk      Assessment /Plan   Diagnoses and all orders for this visit:    1. Postlaminectomy syndrome of lumbar region (Primary)    2. Chronic pain syndrome  -     HYDROcodone-acetaminophen (NORCO) 7.5-325 MG per tablet; Take 1 tablet by mouth 3 (Three) Times a Day As Needed for Severe Pain.  Dispense: 90 tablet; Refill: 0  -     HYDROcodone-acetaminophen (NORCO) 7.5-325 MG per tablet; Take 1 tablet by mouth 3 (Three) Times a Day As Needed for Severe Pain. DNF before 2025  Dispense: 90 tablet; Refill: 0  -     HYDROcodone-acetaminophen  (NORCO) 7.5-325 MG per tablet; Take 1 tablet by mouth 3 (Three) Times a Day As Needed for Severe Pain. DNF before 3/7/2025  Dispense: 90 tablet; Refill: 0    3. Thoracic spondylosis without myelopathy    4. Polyarthralgia    5. High risk medication use    Summary   69-year-old female with chronic back pain, postlaminectomy syndrome here for follow-up.    chronic pain from lumbar DDD spondylosis with radicular pain.  Chronic polyarthralgia.  Repeat right L4-L5 transforaminal JESSICA or SI injection as needed.    Seen in ER at [facility] yesterday for chest pain, ruled out cardiac etiology.  Treating for dyspepsia.  Was given morphine IV.  Otherwise denies any new issues or change in chronic pain symptoms.  Continues to have good relief of hydrocodone without side effects.    Continue hydrocodone 7.5/25 3 times daily.  UDS and inspect reviewed.  Discussed risk of tolerance, dependence, respiratory depression, coma and death associated with use of oral opioids for treatment of chronic nonmalignant pain.      Continue gabapentin/flexeril.  Cont. topical compounded neuropathic/anti-inflammatory cream with ketamine.      RTC 2-3 mo

## 2025-01-08 NOTE — TELEPHONE ENCOUNTER
Hope you are doing better from the GERD that caused her to go to [facility].  It should be noted that there was a 6 mm right lower lobe nodule on the chest CT that does need to get repeat evaluation in July of this year.  Call if any other questions or concerns or if your symptoms fail to improve we can get you into see gastroenterology please keep us posted

## 2025-01-08 NOTE — TELEPHONE ENCOUNTER
Please call patient and advise that we hope she is getting better from the gastro intestinal reflux that caused her to go to [facility] ER.  The chest CT did show a 6 mm right lower lobe nodule that we are having [facility] compared to her prior imaging from [facility] and from [place] from 2021.  If this nodule is new we will need to repeat imaging in July of this year.  If it is the old resolving nodule that no further follow-up will be indicated.  We will let her know as soon as this comparison has been done.

## 2025-01-09 ENCOUNTER — TELEPHONE (OUTPATIENT)
Dept: FAMILY MEDICINE CLINIC | Facility: CLINIC | Age: 70
End: 2025-01-09
Payer: MEDICARE

## 2025-01-09 DIAGNOSIS — K21.9 GASTROESOPHAGEAL REFLUX DISEASE, UNSPECIFIED WHETHER ESOPHAGITIS PRESENT: Primary | ICD-10-CM

## 2025-01-19 RX ORDER — LISINOPRIL AND HYDROCHLOROTHIAZIDE 20; 25 MG/1; MG/1
1 TABLET ORAL DAILY
Qty: 90 TABLET | Refills: 3 | Status: SHIPPED | OUTPATIENT
Start: 2025-01-19

## 2025-01-19 RX ORDER — SULINDAC 200 MG/1
200 TABLET ORAL 2 TIMES DAILY
Qty: 180 TABLET | Refills: 3 | Status: SHIPPED | OUTPATIENT
Start: 2025-01-19

## 2025-02-16 RX ORDER — SERTRALINE HYDROCHLORIDE 25 MG/1
25 TABLET, FILM COATED ORAL DAILY
Qty: 90 TABLET | Refills: 1 | OUTPATIENT
Start: 2025-02-16

## 2025-02-21 ENCOUNTER — TELEPHONE (OUTPATIENT)
Dept: FAMILY MEDICINE CLINIC | Facility: CLINIC | Age: 70
End: 2025-02-21
Payer: MEDICARE

## 2025-02-21 NOTE — TELEPHONE ENCOUNTER
----- Message from Ban Alexander sent at 2/21/2025  1:41 PM EST -----  mammogram normal.  Ordered for one year unless change in symptoms or physical

## 2025-02-21 NOTE — TELEPHONE ENCOUNTER
"    Caller: Sherrie Chand \"Carlos\"    Relationship to patient: Self    Best call back number: [phone]      Patient is needing: PATIENT  CALLING TO INQUIRE AS TO WHY  sertraline (Zoloft) 50 MG tablet   WAS DENIED. PLEASE CALL BACK TO ADVISE        "

## 2025-02-21 NOTE — TELEPHONE ENCOUNTER
Patient calling to see why her sertraline (Zoloft) 50 MG tablet was denied. Patients last OV was 11/4/2024 and her next one is 5/5/2025.   Please advise so I can let her know.

## 2025-04-08 ENCOUNTER — OFFICE VISIT (OUTPATIENT)
Dept: PAIN MEDICINE | Facility: CLINIC | Age: 70
End: 2025-04-08
Payer: MEDICARE

## 2025-04-08 VITALS
DIASTOLIC BLOOD PRESSURE: 79 MMHG | SYSTOLIC BLOOD PRESSURE: 131 MMHG | BODY MASS INDEX: 37.49 KG/M2 | HEART RATE: 103 BPM | RESPIRATION RATE: 16 BRPM | OXYGEN SATURATION: 96 % | WEIGHT: 215 LBS

## 2025-04-08 DIAGNOSIS — Z79.899 HIGH RISK MEDICATION USE: ICD-10-CM

## 2025-04-08 DIAGNOSIS — M25.50 POLYARTHRALGIA: ICD-10-CM

## 2025-04-08 DIAGNOSIS — M96.1 POSTLAMINECTOMY SYNDROME OF LUMBAR REGION: Primary | ICD-10-CM

## 2025-04-08 DIAGNOSIS — G89.4 CHRONIC PAIN SYNDROME: ICD-10-CM

## 2025-04-08 DIAGNOSIS — M47.814 THORACIC SPONDYLOSIS WITHOUT MYELOPATHY: ICD-10-CM

## 2025-04-09 RX ORDER — HYDROCODONE BITARTRATE AND ACETAMINOPHEN 10; 325 MG/1; MG/1
1 TABLET ORAL 3 TIMES DAILY PRN
Qty: 90 TABLET | Refills: 0 | Status: SHIPPED | OUTPATIENT
Start: 2025-05-07

## 2025-04-09 RX ORDER — HYDROCODONE BITARTRATE AND ACETAMINOPHEN 10; 325 MG/1; MG/1
1 TABLET ORAL 3 TIMES DAILY PRN
Qty: 90 TABLET | Refills: 0 | Status: SHIPPED | OUTPATIENT
Start: 2025-04-09

## 2025-04-09 NOTE — PROGRESS NOTES
CC  hip/buttock, right leg, lower back pain, joints pain  70-year-old female with chronic back pain/postlaminectomy syndrome history of fusion L2 to to S1, polyarthralgia, S/P left TKA, here for follow-up.    Denies any new issue except continued and worsening back pain and bilateral lower extremity pain from postlaminectomy syndrome.  No weakness or red flags.  Hydrocodone is helping but not strong enough.    Chronic back pain radiating to bilateral lower extremity worse on the right with aching numbness in both legs worse with standing walking.  Back pain interfering with ADL. or activity.  Denies new weakness saddle anesthesia bladder bowel incontinence.    Pain interfering with daily activity and sleep.  Marginal relief with physical therapy or anti-inflammatories.  Marginal relief with caudal JESSICA in the past.  Declines LESI due to past experience with post dural puncture headache.  60% relief with transforaminal JESSICA.      Utilizes hydrocodone with good relief functional benefit and side effects.     L-spine and T-spine x-ray 2024: Multilevel mild to moderate thoracic spondylosis. Posterior and interbody fusion of the lumbar spine and sacrum without hardware complication.  Advanced degenerative disc disease at the L1-L2 level.posterior fusion of L2 through the sacroiliac joints with bilateral vertical rods and pedicle screws. Interbody disc spacers are present from the L2-L3 through the L5-S1 level. The alignment is normal     CT abdomen pelvis 2021: L2-S1 posterior spinal fusion hardware appears intact. L4 lobectomy with interbody strut is in place. Advanced degenerative disc endplate changes  are present at the L1-2 level. No acute osseous abnormalities are identified.  L-spine MRI 2016: Multilevel degenerative disc disease and degenerative facet change as well as  multilevel postoperative changes detailed above.  There is a 14 mm of anterolisthesis of L4 on L5 which has worsened from the prior exam where this  was only 4 mm.  This results in severe bilateral neural foraminal narrowing but no spinal canal stenosis.    Pain Assessment   Cause of Pain: surgery  Location of Pain: Lower Back, R Hip, L Hip, R Leg, L Leg  Description of Pain: Dull/Aching, Throbbing, Stabbing  Previous Pain Rating : 7  Current Pain Ratin  Aggravating Factors: Activity  Alleviating Factors: Rest, Medication  Previous Treatments: Epidural Steroids, Narcotic Pain Medication, Physical Therapy  Previous Diagnostic Studies: MRI   PEG Assessment   What number best describes your pain on average in the past week?7  What number best describes how, during the past week, pain has interfered with your enjoyment of life?5  What number best describes how, during the past week, pain has interfered with your general activity? 8     Review of Systems        See HPI      Vitals:    25 0834   BP: 131/79   Pulse: 103   Resp: 16   SpO2: 96%     Physical Exam  Vitals reviewed.   Constitutional:       General: She is not in acute distress.  Pulmonary:      Effort: Pulmonary effort is normal.   Musculoskeletal:      Lumbar back: Tenderness present. Positive right straight leg raise test.      Comments: Lumbar loading positive, pain on extension of low back past 5 degrees.  TTP on the lumbar facets noted.       PHQ 9 on chart                     opioid risk tool low risk      Assessment /Plan   Diagnoses and all orders for this visit:    1. Postlaminectomy syndrome of lumbar region (Primary)  -     HYDROcodone-acetaminophen (NORCO)  MG per tablet; Take 1 tablet by mouth 3 (Three) Times a Day As Needed for Severe Pain. DNF before 2025  Dispense: 90 tablet; Refill: 0  -     HYDROcodone-acetaminophen (NORCO)  MG per tablet; Take 1 tablet by mouth 3 (Three) Times a Day As Needed for Severe Pain.  Dispense: 90 tablet; Refill: 0    2. Thoracic spondylosis without myelopathy    3. Polyarthralgia    4. Chronic pain syndrome    5. High risk medication  use  -     [facility] Urine Drug Screen -; Future    Summary   70-year-old female with chronic back pain, postlaminectomy syndrome here for follow-up.    chronic pain from lumbar DDD spondylosis with radicular pain.  Chronic polyarthralgia.  Repeat right L4-L5 transforaminal JESSICA or SI injection as needed.    Denies any new issue except continued and worsening back pain and bilateral lower extremity pain from postlaminectomy syndrome.  No weakness or red flags.  Hydrocodone is helping but not strong enough.  Will increase to 10/325 3 times daily.    Continue hydrocodone 10/25 3 times daily.  UDS and inspect reviewed.  Discussed risk of tolerance, dependence, respiratory depression, coma and death associated with use of oral opioids for treatment of chronic nonmalignant pain.      Continue gabapentin/flexeril.  Cont. topical compounded neuropathic/anti-inflammatory cream with ketamine.      RTC 2-3 mo

## 2025-04-10 RX ORDER — ATORVASTATIN CALCIUM 40 MG/1
40 TABLET, FILM COATED ORAL NIGHTLY
Qty: 90 TABLET | Refills: 3 | Status: SHIPPED | OUTPATIENT
Start: 2025-04-10

## 2025-05-03 NOTE — PATIENT INSTRUCTIONS
Health Maintenance Due   Topic Date Due    TDAP/TD VACCINES (1 - Tdap) Never done    ZOSTER VACCINE (1 of 2) Never done    DXA SCAN  12/22/2024    COVID-19 Vaccine (9 - 2024-25 season) 02/28/2025    ANNUAL WELLNESS VISIT  05/02/2025

## 2025-05-03 NOTE — PROGRESS NOTES
Subjective   The ABCs of the Annual Wellness Visit  Medicare Wellness Visit      Sherrie Chand is a 70 y.o. patient who presents for a Medicare Wellness Visit.    The following portions of the patient's history were reviewed and   updated as appropriate: allergies, current medications, past family history, past medical history, past social history, past surgical history, and problem list.    Compared to one year ago, the patient's physical   health is the same.  Compared to one year ago, the patient's mental   health is better.    Recent Hospitalizations:  She was not admitted to the hospital during the last year.     Current Medical Providers:  Patient Care Team:  Ban Alexander MD as PCP - General (Family Medicine)  Sandra Pak MD as Consulting Physician (Pain Medicine)  Anneliese Hutchins MD as Consulting Physician (Hematology and Oncology)    Outpatient Medications Prior to Visit   Medication Sig Dispense Refill    atorvastatin (LIPITOR) 40 MG tablet Take 1 tablet by mouth Every Night. 90 tablet 3    cyclobenzaprine (FLEXERIL) 10 MG tablet Take 1 tablet by mouth 3 (Three) Times a Day As Needed for Muscle Spasms. 90 tablet 11    gabapentin (NEURONTIN) 800 MG tablet Take 1 tablet by mouth 3 (Three) Times a Day. 270 tablet 3    [START ON 5/7/2025] HYDROcodone-acetaminophen (NORCO)  MG per tablet Take 1 tablet by mouth 3 (Three) Times a Day As Needed for Severe Pain. DNF before 5/7/2025 90 tablet 0    HYDROcodone-acetaminophen (NORCO)  MG per tablet Take 1 tablet by mouth 3 (Three) Times a Day As Needed for Severe Pain. 90 tablet 0    lisinopril-hydrochlorothiazide (PRINZIDE,ZESTORETIC) 20-25 MG per tablet TAKE 1 TABLET BY MOUTH DAILY 90 tablet 3    Misc. Devices (Bariatric Rollator) misc 1 each Daily. Indications: back pain and needs seat to rest forwalking. 1 each 0    sertraline (Zoloft) 50 MG tablet Take 1 tablet by mouth Daily. 90 tablet 1    St Johns Wort 150 MG capsule Take  1 capsule by mouth Daily.      sulindac (CLINORIL) 200 MG tablet TAKE 1 TABLET BY MOUTH TWICE  DAILY 180 tablet 3    vitamin B-6 (PYRIDOXINE) 100 MG tablet Take 3 tablets by mouth Daily.      Mounjaro 7.5 MG/0.5ML solution pen-injector pen INJECT 7.5 MG UNDER THE SKIN ONCE A WEEK (Patient not taking: Reported on 4/8/2025) 2 mL 0    nitrofurantoin, macrocrystal-monohydrate, (Macrobid) 100 MG capsule Take 1 capsule by mouth 2 (Two) Times a Day. (Patient not taking: Reported on 4/8/2025) 10 capsule 0     No facility-administered medications prior to visit.     Opioid medication/s are on active medication list.  and I have evaluated her active treatment plan and pain score trends (see table).  Vitals:    05/05/25 1027   PainSc: 5      I have reviewed the chart for potential of high risk medication and harmful drug interactions in the elderly.        Aspirin is not on active medication list.  Aspirin use is not indicated based on review of current medical condition/s. Risk of harm outweighs potential benefits.  .    Patient Active Problem List   Diagnosis    Anxiety    Arthritis    B12 deficiency    Chronic pain    Constipation due to pain medication    Degeneration of lumbar or lumbosacral intervertebral disc    Depression    Family history of lung cancer    Former smoker    Hyperglycemia    Hyperlipidemia    Hypertension    Osteoarthritis of lumbosacral spine without myelopathy    Other long term (current) drug therapy    Vitamin D deficiency    Astigmatism    Pseudoarthrosis of lumbar spine    S/P lumbar fusion    Senile nuclear sclerosis    Spinal stenosis    Tear film insufficiency    Spondylolisthesis at L4-L5 level    Disease of bronchial airway    Osteoporosis    Morbid (severe) obesity due to excess calories    Class 2 severe obesity due to excess calories with serious comorbidity and body mass index (BMI) of 37.0 to 37.9 in adult    Urinary frequency     Advance Care Planning Advance Directive is not on file.   "ACP discussion was held with the patient during this visit. Patient does not have an advance directive, declines further assistance.            Objective   Vitals:    25 1027 25 1032   BP: 97/58 110/77   BP Location: Left arm Right arm   Patient Position: Standing Sitting   Cuff Size: Large Adult Large Adult   Pulse: 82 84   Temp: 97.2 °F (36.2 °C)    TempSrc: Infrared    SpO2: 95%    Weight: 98.8 kg (217 lb 12.8 oz)    Height: 161.3 cm (63.5\")    PainSc: 5         Estimated body mass index is 37.97 kg/m² as calculated from the following:    Height as of this encounter: 161.3 cm (63.5\").    Weight as of this encounter: 98.8 kg (217 lb 12.8 oz).    Class 2 Severe Obesity (BMI >=35 and <=39.9). Obesity-related health conditions include the following: dyslipidemias and GERD. Obesity is improving with lifestyle modifications. BMI is is above average; BMI management plan is completed. We discussed portion control and increasing exercise.           Does the patient have evidence of cognitive impairment? No                                                                                                Health  Risk Assessment    Smoking Status:  Social History     Tobacco Use   Smoking Status Former    Current packs/day: 0.00    Average packs/day: 0.5 packs/day for 30.0 years (15.0 ttl pk-yrs)    Types: Cigarettes    Start date: 1987    Quit date: 2017    Years since quittin.3    Passive exposure: Never   Smokeless Tobacco Never     Alcohol Consumption:  Social History     Substance and Sexual Activity   Alcohol Use No       Fall Risk Screen  STEADI Fall Risk Assessment was completed, and patient is at LOW risk for falls.Assessment completed on:2025    Depression Screening   Little interest or pleasure in doing things? Not at all   Feeling down, depressed, or hopeless? Not at all   PHQ-2 Total Score 0      Health Habits and Functional and Cognitive Screenin/28/2025     7:39 AM   Functional & " Cognitive Status   Do you have difficulty preparing food and eating? No   Do you have difficulty bathing yourself, getting dressed or grooming yourself? No   Do you have difficulty using the toilet? No   Do you have difficulty moving around from place to place? No   Do you have trouble with steps or getting out of a bed or a chair? No   Current Diet Well Balanced Diet   Dental Exam Up to date   Eye Exam Up to date   Exercise (times per week) 3 times per week   Current Exercises Include Cardiovascular Workout;Walking   Do you need help using the phone?  No   Are you deaf or do you have serious difficulty hearing?  No   Do you need help to go to places out of walking distance? Yes   Do you need help shopping? Yes   Do you need help preparing meals?  No   Do you need help with housework?  Yes   Do you need help with laundry? No   Do you need help taking your medications? No   Do you need help managing money? No   Do you ever drive or ride in a car without wearing a seat belt? No   Have you felt unusual stress, anger or loneliness in the last month? No   Who do you live with? Spouse   If you need help, do you have trouble finding someone available to you? No   Have you been bothered in the last four weeks by sexual problems? No   Do you have difficulty concentrating, remembering or making decisions? No           Age-appropriate Screening Schedule:  Refer to the list below for future screening recommendations based on patient's age, sex and/or medical conditions. Orders for these recommended tests are listed in the plan section. The patient has been provided with a written plan.    Health Maintenance List  Health Maintenance   Topic Date Due    TDAP/TD VACCINES (1 - Tdap) Never done    ZOSTER VACCINE (1 of 2) Never done    DXA SCAN  12/22/2024    COVID-19 Vaccine (9 - 2024-25 season) 02/28/2025    INFLUENZA VACCINE  07/01/2025    LIPID PANEL  11/04/2025    ANNUAL WELLNESS VISIT  05/05/2026    MAMMOGRAM  02/21/2027     COLORECTAL CANCER SCREENING  02/14/2033    HEPATITIS C SCREENING  Completed    Pneumococcal Vaccine 50+  Completed    LUNG CANCER SCREENING  Discontinued                                                                                                                                                CMS Preventative Services Quick Reference  Risk Factors Identified During Encounter  Fall Risk-High or Moderate: Discussed Fall Prevention in the home    The above risks/problems have been discussed with the patient.  Pertinent information has been shared with the patient in the After Visit Summary.  An After Visit Summary and PPPS were made available to the patient.    Follow Up:   Next Medicare Wellness visit to be scheduled in 1 year.         Additional E&M Note during same encounter follows:  Patient has additional, significant, and separately identifiable condition(s)/problem(s) that require work above and beyond the Medicare Wellness Visit     Chief Complaint  Medicare Wellness-subsequent (Is fasting ) and Hypertension    Subjective    HPI  Carlos is also being seen today for an annual adult preventative physical exam.  and Carlos is also being seen today for additional medical problem/s.  Patient presents today for her annual wellness exam and was advised to wear sunscreen and seatbelt.  Patient also would like to restart a different weight loss medicine and she would like to try Zepbound.  She had no trouble with the Wegovy but she just sort of leveled out at 3 to 4 months and was unable to lose more weight she has been off this for 3 months.     The patient is a 70-year-old female who presents today for her annual and age-specific physical for Medicare. She has a history of postmenopausal status, B12 deficiency, depression, former smoking, hyperglycemia, hyperlipidemia, hypertension, vitamin D deficiency, class II severe obesity due to excess calories with serious comorbidities, and age-related osteoporosis. Her  body mass index is 37.    She reports no current health issues and has been fasting in preparation for today's lab work. She is due for a COVID-19 booster vaccine. She has not sought dental care as she utilizes dentures, which are well-fitted. Her last ophthalmological examination was conducted a year ago. She reports no dysphagia or digestive issues. She is unaware of any snoring at night and reports no hemoptysis, sputum production, or significant wheezing. She ceased smoking in 2017. She reports no chest discomfort or palpitations. She reports no urinary symptoms such as dysuria or hematuria, and no abnormal vaginal discharge. She reports no melena, fever, chills, or night sweats. Her weight has remained stable. She engages in daily physical activity, specifically walking for 20 minutes. She is currently taking vitamin B12 supplements every other day. She does not regularly use aspirin and does not have an advanced directive in place.    She has previously been on Mounjaro injections but discontinued them 3 months ago due to lack of efficacy. She is considering trying Zepbound as an alternative.    She has a history of severe indigestion, which was initially suspected to be a myocardial infarction or pulmonary embolism. A CTA of the chest was [facility] at [facility] on 01/07/2025. She was prescribed Pepcid, which provided relief.    Her mood and depression have improved since her medication dosage was increased. She does not endorse any homicidal or suicidal ideation.    She is not currently on any medication for osteoporosis and believes her last DEXA scan was normal.    SOCIAL HISTORY  The patient is a former smoker, quit in 2017.    FAMILY HISTORY  There is no family history of multiple endocrine neoplasia type II or thyroid cancer.  Review of Systems   Psychiatric/Behavioral:          Patient is feeling much improved on the increased dose of antidepressant certainly no suicidal or homicidal thoughts         "  Objective   Vital Signs:  /77 (BP Location: Right arm, Patient Position: Sitting, Cuff Size: Large Adult)   Pulse 84   Temp 97.2 °F (36.2 °C) (Infrared)   Ht 161.3 cm (63.5\")   Wt 98.8 kg (217 lb 12.8 oz)   SpO2 95%   BMI 37.97 kg/m²   Physical Exam  Vitals reviewed.   Constitutional:       General: She is not in acute distress.     Appearance: She is well-developed. She is obese. She is not ill-appearing or toxic-appearing.   HENT:      Head: Normocephalic and atraumatic.      Right Ear: Tympanic membrane, ear canal and external ear normal.      Left Ear: Tympanic membrane, ear canal and external ear normal.      Nose: Nose normal.      Mouth/Throat:      Mouth: Mucous membranes are moist.      Pharynx: No posterior oropharyngeal erythema.   Eyes:      Extraocular Movements: Extraocular movements intact.      Conjunctiva/sclera: Conjunctivae normal.      Pupils: Pupils are equal, round, and reactive to light.   Neck:      Vascular: No carotid bruit.   Cardiovascular:      Rate and Rhythm: Normal rate and regular rhythm.      Heart sounds: Normal heart sounds.   Pulmonary:      Effort: Pulmonary effort is normal.      Comments: Decreased breath sounds bilaterally  Abdominal:      General: Bowel sounds are normal. There is no distension.      Palpations: Abdomen is soft. There is no mass.      Tenderness: There is no abdominal tenderness. There is no right CVA tenderness or left CVA tenderness.   Musculoskeletal:         General: Normal range of motion.      Cervical back: Neck supple. No tenderness.      Right lower leg: No edema.      Left lower leg: No edema.   Lymphadenopathy:      Cervical: No cervical adenopathy.   Skin:     General: Skin is warm.   Neurological:      General: No focal deficit present.      Mental Status: She is alert and oriented to person, place, and time.   Psychiatric:         Mood and Affect: Mood normal.         Behavior: Behavior normal.           Mouth/Throat: Throat " appears normal.  Neck: Carotid sounds are normal.  Respiratory: Clear to auscultation, no wheezing, rales or rhonchi.  Cardiovascular: Regular rate and rhythm, heart rate is 88.            Results  Imaging   - CTA of the chest: 01/07/2025, 6 mm right lower lobe nodule is essentially unchanged in size when compared to prior CT.           Assessment and Plan        1. Annual Medicare physical examination.  - Blood pressure readings were within normal range during this visit.  - Dyslipidemia and GERD conditions are showing signs of improvement.  - No evidence of cognitive dysfunction.  - COVID-19 booster vaccine will be administered today. Comprehensive laboratory tests will be conducted, including a complete blood count, kidney and liver function tests, A1c, cholesterol, magnesium, thyroid, B12, and vitamin D levels.    2. Obesity.  - Weight management through diet and exercise has been discussed.  - Prescription for Zepbound issued; if insurance does not cover it, medication can be obtained directly from [employer].  - Weight has decreased slightly.  - Patient previously used Mounjaro but did not experience significant results.    3. Depression.  - Mood has improved since medication dosage was increased.  - No feelings of homicidal or suicidal ideation.  - Continued monitoring of mood and potential medication adjustments will be made as necessary.    4. Age-related osteoporosis.  - Not currently on any medication for osteoporosis.  - Last DEXA scan was normal.  - Results of DEXA scan from [facility] will be obtained for further evaluation.    5. Health maintenance.  - CTA of the chest at [facility] on 01/07/2025 showed a stable 6 mm right lower lobe nodule.  - Follow-up chest CT will be scheduled for January 2026 to monitor the lung nodule.  - Advised to schedule an eye exam this year to check for glaucoma.    Follow-up  The patient will follow up in 6 months.         Follow Up   Return in about 6 months (around  11/5/2025).  Patient was given instructions and counseling regarding her condition or for health maintenance advice. Please see specific information pulled into the AVS if appropriate.  Patient or patient representative verbalized consent for the use of Ambient Listening during the visit with  Ban Alexander MD for chart documentation. 5/5/2025  12:13 EDT

## 2025-05-03 NOTE — ASSESSMENT & PLAN NOTE
Patient's (There is no height or weight on file to calculate BMI.) indicates that they are morbidly/severely obese (BMI > 40 or > 35 with obesity - related health condition) with health conditions that include hypertension, impaired fasting glucose, and dyslipidemias . Weight is unchanged. BMI  is above average; BMI management plan is completed. We discussed portion control and increasing exercise.

## 2025-05-05 ENCOUNTER — LAB (OUTPATIENT)
Dept: FAMILY MEDICINE CLINIC | Facility: CLINIC | Age: 70
End: 2025-05-05
Payer: MEDICARE

## 2025-05-05 ENCOUNTER — OFFICE VISIT (OUTPATIENT)
Dept: FAMILY MEDICINE CLINIC | Facility: CLINIC | Age: 70
End: 2025-05-05
Payer: MEDICARE

## 2025-05-05 VITALS
TEMPERATURE: 97.2 F | HEIGHT: 64 IN | HEART RATE: 84 BPM | WEIGHT: 217.8 LBS | OXYGEN SATURATION: 95 % | DIASTOLIC BLOOD PRESSURE: 77 MMHG | BODY MASS INDEX: 37.18 KG/M2 | SYSTOLIC BLOOD PRESSURE: 110 MMHG

## 2025-05-05 DIAGNOSIS — M81.0 AGE RELATED OSTEOPOROSIS, UNSPECIFIED PATHOLOGICAL FRACTURE PRESENCE: ICD-10-CM

## 2025-05-05 DIAGNOSIS — Z00.01 ENCOUNTER FOR ANNUAL GENERAL MEDICAL EXAMINATION WITH ABNORMAL FINDINGS IN ADULT: Primary | ICD-10-CM

## 2025-05-05 DIAGNOSIS — F32.A DEPRESSION, UNSPECIFIED DEPRESSION TYPE: ICD-10-CM

## 2025-05-05 DIAGNOSIS — E66.812 CLASS 2 SEVERE OBESITY DUE TO EXCESS CALORIES WITH SERIOUS COMORBIDITY AND BODY MASS INDEX (BMI) OF 37.0 TO 37.9 IN ADULT: ICD-10-CM

## 2025-05-05 DIAGNOSIS — Z00.01 ENCOUNTER FOR ANNUAL GENERAL MEDICAL EXAMINATION WITH ABNORMAL FINDINGS IN ADULT: ICD-10-CM

## 2025-05-05 DIAGNOSIS — E78.5 HYPERLIPIDEMIA, UNSPECIFIED HYPERLIPIDEMIA TYPE: ICD-10-CM

## 2025-05-05 DIAGNOSIS — E55.9 VITAMIN D DEFICIENCY: ICD-10-CM

## 2025-05-05 DIAGNOSIS — E66.01 CLASS 2 SEVERE OBESITY DUE TO EXCESS CALORIES WITH SERIOUS COMORBIDITY AND BODY MASS INDEX (BMI) OF 37.0 TO 37.9 IN ADULT: ICD-10-CM

## 2025-05-05 DIAGNOSIS — Z78.0 POST-MENOPAUSAL: ICD-10-CM

## 2025-05-05 DIAGNOSIS — Z87.891 FORMER SMOKER: ICD-10-CM

## 2025-05-05 DIAGNOSIS — R73.9 HYPERGLYCEMIA: ICD-10-CM

## 2025-05-05 DIAGNOSIS — E53.8 B12 DEFICIENCY: ICD-10-CM

## 2025-05-05 DIAGNOSIS — I10 PRIMARY HYPERTENSION: ICD-10-CM

## 2025-05-05 LAB
25(OH)D3 SERPL-MCNC: 43.1 NG/ML (ref 30–100)
ALBUMIN SERPL-MCNC: 4.3 G/DL (ref 3.5–5.2)
ALBUMIN/GLOB SERPL: 1.5 G/DL
ALP SERPL-CCNC: 103 U/L (ref 39–117)
ALT SERPL W P-5'-P-CCNC: 25 U/L (ref 1–33)
ANION GAP SERPL CALCULATED.3IONS-SCNC: 11.8 MMOL/L (ref 5–15)
AST SERPL-CCNC: 22 U/L (ref 1–32)
BASOPHILS # BLD AUTO: 0.06 10*3/MM3 (ref 0–0.2)
BASOPHILS NFR BLD AUTO: 0.5 % (ref 0–1.5)
BILIRUB SERPL-MCNC: 0.4 MG/DL (ref 0–1.2)
BUN SERPL-MCNC: 14 MG/DL (ref 8–23)
BUN/CREAT SERPL: 13.9 (ref 7–25)
CALCIUM SPEC-SCNC: 10 MG/DL (ref 8.6–10.5)
CHLORIDE SERPL-SCNC: 101 MMOL/L (ref 98–107)
CHOLEST SERPL-MCNC: 118 MG/DL (ref 0–200)
CO2 SERPL-SCNC: 25.2 MMOL/L (ref 22–29)
CREAT SERPL-MCNC: 1.01 MG/DL (ref 0.57–1)
DEPRECATED RDW RBC AUTO: 44.8 FL (ref 37–54)
EGFRCR SERPLBLD CKD-EPI 2021: 60.8 ML/MIN/1.73
EOSINOPHIL # BLD AUTO: 0.1 10*3/MM3 (ref 0–0.4)
EOSINOPHIL NFR BLD AUTO: 0.8 % (ref 0.3–6.2)
ERYTHROCYTE [DISTWIDTH] IN BLOOD BY AUTOMATED COUNT: 12.6 % (ref 12.3–15.4)
GLOBULIN UR ELPH-MCNC: 2.9 GM/DL
GLUCOSE SERPL-MCNC: 104 MG/DL (ref 65–99)
HBA1C MFR BLD: 5.1 % (ref 4.8–5.6)
HCT VFR BLD AUTO: 44.6 % (ref 34–46.6)
HDLC SERPL-MCNC: 41 MG/DL (ref 40–60)
HGB BLD-MCNC: 14.2 G/DL (ref 12–15.9)
IMM GRANULOCYTES # BLD AUTO: 0.14 10*3/MM3 (ref 0–0.05)
IMM GRANULOCYTES NFR BLD AUTO: 1.1 % (ref 0–0.5)
LDLC SERPL CALC-MCNC: 51 MG/DL (ref 0–100)
LDLC/HDLC SERPL: 1.15 {RATIO}
LYMPHOCYTES # BLD AUTO: 1.92 10*3/MM3 (ref 0.7–3.1)
LYMPHOCYTES NFR BLD AUTO: 15.4 % (ref 19.6–45.3)
MAGNESIUM SERPL-MCNC: 2 MG/DL (ref 1.6–2.4)
MCH RBC QN AUTO: 30.9 PG (ref 26.6–33)
MCHC RBC AUTO-ENTMCNC: 31.8 G/DL (ref 31.5–35.7)
MCV RBC AUTO: 97.2 FL (ref 79–97)
MONOCYTES # BLD AUTO: 0.67 10*3/MM3 (ref 0.1–0.9)
MONOCYTES NFR BLD AUTO: 5.4 % (ref 5–12)
NEUTROPHILS NFR BLD AUTO: 76.8 % (ref 42.7–76)
NEUTROPHILS NFR BLD AUTO: 9.61 10*3/MM3 (ref 1.7–7)
NRBC BLD AUTO-RTO: 0 /100 WBC (ref 0–0.2)
PLATELET # BLD AUTO: 360 10*3/MM3 (ref 140–450)
PMV BLD AUTO: 11.6 FL (ref 6–12)
POTASSIUM SERPL-SCNC: 4.1 MMOL/L (ref 3.5–5.2)
PROT SERPL-MCNC: 7.2 G/DL (ref 6–8.5)
RBC # BLD AUTO: 4.59 10*6/MM3 (ref 3.77–5.28)
RBC MORPH BLD: NORMAL
SMALL PLATELETS BLD QL SMEAR: ADEQUATE
SODIUM SERPL-SCNC: 138 MMOL/L (ref 136–145)
TRIGL SERPL-MCNC: 149 MG/DL (ref 0–150)
TSH SERPL DL<=0.05 MIU/L-ACNC: 1.01 UIU/ML (ref 0.27–4.2)
VIT B12 BLD-MCNC: 979 PG/ML (ref 211–946)
VLDLC SERPL-MCNC: 26 MG/DL (ref 5–40)
WBC MORPH BLD: NORMAL
WBC NRBC COR # BLD AUTO: 12.5 10*3/MM3 (ref 3.4–10.8)

## 2025-05-05 PROCEDURE — 80061 LIPID PANEL: CPT | Performed by: PREVENTIVE MEDICINE

## 2025-05-05 PROCEDURE — 83735 ASSAY OF MAGNESIUM: CPT | Performed by: PREVENTIVE MEDICINE

## 2025-05-05 PROCEDURE — 36415 COLL VENOUS BLD VENIPUNCTURE: CPT

## 2025-05-05 PROCEDURE — 80053 COMPREHEN METABOLIC PANEL: CPT | Performed by: PREVENTIVE MEDICINE

## 2025-05-05 PROCEDURE — 82306 VITAMIN D 25 HYDROXY: CPT | Performed by: PREVENTIVE MEDICINE

## 2025-05-05 PROCEDURE — 83036 HEMOGLOBIN GLYCOSYLATED A1C: CPT | Performed by: PREVENTIVE MEDICINE

## 2025-05-05 PROCEDURE — 84443 ASSAY THYROID STIM HORMONE: CPT | Performed by: PREVENTIVE MEDICINE

## 2025-05-05 PROCEDURE — 85007 BL SMEAR W/DIFF WBC COUNT: CPT | Performed by: PREVENTIVE MEDICINE

## 2025-05-05 PROCEDURE — 85025 COMPLETE CBC W/AUTO DIFF WBC: CPT | Performed by: PREVENTIVE MEDICINE

## 2025-05-05 PROCEDURE — 82607 VITAMIN B-12: CPT | Performed by: PREVENTIVE MEDICINE

## 2025-05-06 ENCOUNTER — PRIOR AUTHORIZATION (OUTPATIENT)
Dept: FAMILY MEDICINE CLINIC | Facility: CLINIC | Age: 70
End: 2025-05-06
Payer: MEDICARE

## 2025-05-06 ENCOUNTER — RESULTS FOLLOW-UP (OUTPATIENT)
Dept: FAMILY MEDICINE CLINIC | Facility: CLINIC | Age: 70
End: 2025-05-06
Payer: MEDICARE

## 2025-05-06 DIAGNOSIS — Z78.0 POSTMENOPAUSE: ICD-10-CM

## 2025-05-06 NOTE — PROGRESS NOTES
White blood cell count was high but yours usually is we will continue to monitor or you can see the hematology oncology doctor if you like it really is not any different than it has been over the last 2 years.  Vitamin B12 is slightly elevated so you can decrease 1 dose per week.  Blood sugar was 104 so watch your carbs and keep up your walking call if any other questions or concerns and let us know about the above

## 2025-05-07 NOTE — TELEPHONE ENCOUNTER
Provided pt with information on [employer] and that was something that shed be interested in. She is going to fill out the application forms. Pt stated she would let us know once those are competed. Orders are in for now pending signature.

## 2025-06-04 ENCOUNTER — OFFICE VISIT (OUTPATIENT)
Dept: PAIN MEDICINE | Facility: CLINIC | Age: 70
End: 2025-06-04
Payer: MEDICARE

## 2025-06-04 VITALS
BODY MASS INDEX: 37.31 KG/M2 | DIASTOLIC BLOOD PRESSURE: 78 MMHG | SYSTOLIC BLOOD PRESSURE: 129 MMHG | OXYGEN SATURATION: 95 % | HEART RATE: 78 BPM | RESPIRATION RATE: 16 BRPM | WEIGHT: 214 LBS

## 2025-06-04 DIAGNOSIS — M96.1 POSTLAMINECTOMY SYNDROME OF LUMBAR REGION: Primary | ICD-10-CM

## 2025-06-04 DIAGNOSIS — M47.814 THORACIC SPONDYLOSIS WITHOUT MYELOPATHY: ICD-10-CM

## 2025-06-04 DIAGNOSIS — M25.50 POLYARTHRALGIA: ICD-10-CM

## 2025-06-04 DIAGNOSIS — Z79.899 HIGH RISK MEDICATION USE: ICD-10-CM

## 2025-06-04 RX ORDER — HYDROCODONE BITARTRATE AND ACETAMINOPHEN 10; 325 MG/1; MG/1
1 TABLET ORAL 3 TIMES DAILY PRN
Qty: 90 TABLET | Refills: 0 | Status: SHIPPED | OUTPATIENT
Start: 2025-06-06

## 2025-06-04 RX ORDER — HYDROCODONE BITARTRATE AND ACETAMINOPHEN 10; 325 MG/1; MG/1
1 TABLET ORAL 3 TIMES DAILY PRN
Qty: 90 TABLET | Refills: 0 | Status: SHIPPED | OUTPATIENT
Start: 2025-07-03

## 2025-06-04 NOTE — PROGRESS NOTES
CC  hip/buttock, right leg, lower back pain, joints pain  70-year-old female with chronic back pain/postlaminectomy syndrome history of fusion L2 to to S1, polyarthralgia, S/P left TKA, here for follow-up.    Reports significantly better relief with hydrocodone 10/325.  Denies any new complaints today.    Chronic back pain radiating to bilateral lower extremity worse on the right with aching numbness in both legs worse with standing walking.  Back pain interfering with ADL. or activity.  Denies new weakness saddle anesthesia bladder bowel incontinence.    Pain interfering with daily activity and sleep.  Marginal relief with physical therapy or anti-inflammatories.  Marginal relief with caudal JESSICA in the past.  Declines LESI due to past experience with post dural puncture headache.  60% relief with transforaminal JESSICA.      Utilizes hydrocodone with good relief functional benefit and side effects.     L-spine and T-spine x-ray 2024: Multilevel mild to moderate thoracic spondylosis. Posterior and interbody fusion of the lumbar spine and sacrum without hardware complication.  Advanced degenerative disc disease at the L1-L2 level.posterior fusion of L2 through the sacroiliac joints with bilateral vertical rods and pedicle screws. Interbody disc spacers are present from the L2-L3 through the L5-S1 level. The alignment is normal     CT abdomen pelvis 2021: L2-S1 posterior spinal fusion hardware appears intact. L4 lobectomy with interbody strut is in place. Advanced degenerative disc endplate changes  are present at the L1-2 level. No acute osseous abnormalities are identified.  L-spine MRI 2016: Multilevel degenerative disc disease and degenerative facet change as well as  multilevel postoperative changes detailed above.  There is a 14 mm of anterolisthesis of L4 on L5 which has worsened from the prior exam where this was only 4 mm.  This results in severe bilateral neural foraminal narrowing but no spinal canal  stenosis.    Pain Assessment   Cause of Pain: surgery  Location of Pain: Lower Back, R Hip, L Hip, R Leg, L Leg  Description of Pain: Dull/Aching, Throbbing, Stabbing  Previous Pain Rating : 7  Current Pain Rating: 3  Aggravating Factors: Activity  Alleviating Factors: Rest, Medication  Previous Treatments: Epidural Steroids, Narcotic Pain Medication, Physical Therapy  Previous Diagnostic Studies: MRI   PEG Assessment   What number best describes your pain on average in the past week?3  What number best describes how, during the past week, pain has interfered with your enjoyment of life?5  What number best describes how, during the past week, pain has interfered with your general activity? 9     Review of Systems        See HPI      Vitals:    06/04/25 0848   BP: 129/78   Pulse: 78   Resp: 16   SpO2: 95%     Physical Exam  Vitals reviewed.   Constitutional:       General: She is not in acute distress.  Pulmonary:      Effort: Pulmonary effort is normal.   Musculoskeletal:      Lumbar back: Tenderness present. Positive right straight leg raise test.      Comments: Lumbar loading positive, pain on extension of low back past 5 degrees.  TTP on the lumbar facets noted.       PHQ 9 on chart                     opioid risk tool low risk      Assessment /Plan   Diagnoses and all orders for this visit:    1. Postlaminectomy syndrome of lumbar region (Primary)  -     HYDROcodone-acetaminophen (NORCO)  MG per tablet; Take 1 tablet by mouth 3 (Three) Times a Day As Needed for Severe Pain. DNF before 7/3/2025  Dispense: 90 tablet; Refill: 0  -     HYDROcodone-acetaminophen (NORCO)  MG per tablet; Take 1 tablet by mouth 3 (Three) Times a Day As Needed for Severe Pain.  Dispense: 90 tablet; Refill: 0    2. Polyarthralgia    3. Thoracic spondylosis without myelopathy    4. High risk medication use        Summary   70-year-old female with chronic back pain, postlaminectomy syndrome here for follow-up.    chronic pain  from lumbar DDD spondylosis with radicular pain.  Chronic polyarthralgia.  Repeat right L4-L5 transforaminal JESSICA or SI injection as needed.    Reports significantly better relief with hydrocodone 10/325.  Denies any new complaints today.    Continue hydrocodone 10/25 3 times daily.  UDS and inspect reviewed.  Discussed risk of tolerance, dependence, respiratory depression, coma and death associated with use of oral opioids for treatment of chronic nonmalignant pain.      Continue gabapentin/flexeril.  Cont. topical compounded neuropathic/anti-inflammatory cream with ketamine.      RTC 2-3 mo

## 2025-06-05 RX ORDER — CYCLOBENZAPRINE HCL 10 MG
10 TABLET ORAL 3 TIMES DAILY PRN
Qty: 90 TABLET | Refills: 11 | Status: SHIPPED | OUTPATIENT
Start: 2025-06-05

## 2025-07-31 ENCOUNTER — OFFICE VISIT (OUTPATIENT)
Dept: PAIN MEDICINE | Facility: CLINIC | Age: 70
End: 2025-07-31
Payer: MEDICARE

## 2025-07-31 VITALS
SYSTOLIC BLOOD PRESSURE: 128 MMHG | WEIGHT: 220 LBS | BODY MASS INDEX: 38.36 KG/M2 | HEART RATE: 88 BPM | OXYGEN SATURATION: 95 % | DIASTOLIC BLOOD PRESSURE: 68 MMHG | RESPIRATION RATE: 16 BRPM

## 2025-07-31 DIAGNOSIS — Z79.899 HIGH RISK MEDICATION USE: Primary | ICD-10-CM

## 2025-07-31 DIAGNOSIS — M96.1 POSTLAMINECTOMY SYNDROME OF LUMBAR REGION: ICD-10-CM

## 2025-07-31 DIAGNOSIS — M47.814 THORACIC SPONDYLOSIS WITHOUT MYELOPATHY: ICD-10-CM

## 2025-07-31 DIAGNOSIS — M25.50 POLYARTHRALGIA: ICD-10-CM

## 2025-07-31 RX ORDER — HYDROCODONE BITARTRATE AND ACETAMINOPHEN 10; 325 MG/1; MG/1
1 TABLET ORAL 3 TIMES DAILY PRN
Qty: 90 TABLET | Refills: 0 | Status: SHIPPED | OUTPATIENT
Start: 2025-08-01

## 2025-07-31 RX ORDER — HYDROCODONE BITARTRATE AND ACETAMINOPHEN 10; 325 MG/1; MG/1
1 TABLET ORAL 3 TIMES DAILY PRN
Qty: 90 TABLET | Refills: 0 | Status: SHIPPED | OUTPATIENT
Start: 2025-09-01

## 2025-07-31 NOTE — PROGRESS NOTES
CC  hip/buttock, right leg, lower back pain, joints pain  70-year-old female with chronic back pain/postlaminectomy syndrome history of fusion L2 to to S1, polyarthralgia, S/P left TKA, here for follow-up.  Denies any complaints today.  Chronic back pain radiating to bilateral lower extremity worse on the right with aching numbness in both legs worse with standing walking.  Back pain interfering with ADL. or activity.  Denies new weakness saddle anesthesia bladder bowel incontinence.    Pain interfering with daily activity and sleep.  Marginal relief with physical therapy or anti-inflammatories.  Marginal relief with caudal JESSICA in the past.  Declines LESI due to past experience with post dural puncture headache.  60% relief with transforaminal JESSICA.      Utilizes hydrocodone with good relief functional benefit and side effects.     L-spine and T-spine x-ray 2024: Multilevel mild to moderate thoracic spondylosis. Posterior and interbody fusion of the lumbar spine and sacrum without hardware complication.  Advanced degenerative disc disease at the L1-L2 level.posterior fusion of L2 through the sacroiliac joints with bilateral vertical rods and pedicle screws. Interbody disc spacers are present from the L2-L3 through the L5-S1 level. The alignment is normal     CT abdomen pelvis 2021: L2-S1 posterior spinal fusion hardware appears intact. L4 lobectomy with interbody strut is in place. Advanced degenerative disc endplate changes  are present at the L1-2 level. No acute osseous abnormalities are identified.  L-spine MRI 2016: Multilevel degenerative disc disease and degenerative facet change as well as  multilevel postoperative changes detailed above.  There is a 14 mm of anterolisthesis of L4 on L5 which has worsened from the prior exam where this was only 4 mm.  This results in severe bilateral neural foraminal narrowing but no spinal canal stenosis.    Pain Assessment   Cause of Pain: surgery  Location of Pain: Lower  Back, R Hip, L Hip, R Leg, L Leg  Description of Pain: Dull/Aching, Throbbing, Stabbing  Previous Pain Rating : 7  Current Pain Ratin  Aggravating Factors: Activity  Alleviating Factors: Rest, Medication  Previous Treatments: Epidural Steroids, Narcotic Pain Medication, Physical Therapy  Previous Diagnostic Studies: MRI   PEG Assessment   What number best describes your pain on average in the past week?3  What number best describes how, during the past week, pain has interfered with your enjoyment of life?5  What number best describes how, during the past week, pain has interfered with your general activity? 9     Review of Systems        See HPI      Vitals:    25 0841   BP: 128/68   Pulse: 88   Resp: 16   SpO2: 95%     Physical Exam  Vitals reviewed.   Constitutional:       General: She is not in acute distress.  Pulmonary:      Effort: Pulmonary effort is normal.   Musculoskeletal:      Lumbar back: Tenderness present. Positive right straight leg raise test.      Comments: Lumbar loading positive, pain on extension of low back past 5 degrees.  TTP on the lumbar facets noted.       PHQ 9 on chart                     opioid risk tool low risk      Assessment /Plan   Diagnoses and all orders for this visit:    1. Postlaminectomy syndrome of lumbar region (Primary)  -     HYDROcodone-acetaminophen (NORCO)  MG per tablet; Take 1 tablet by mouth 3 (Three) Times a Day As Needed for Severe Pain. DNF before 2025  Dispense: 90 tablet; Refill: 0  -     HYDROcodone-acetaminophen (NORCO)  MG per tablet; Take 1 tablet by mouth 3 (Three) Times a Day As Needed for Severe Pain.  Dispense: 90 tablet; Refill: 0    2. Thoracic spondylosis without myelopathy    3. Polyarthralgia    4. High risk medication use        Summary   70-year-old female with chronic back pain, postlaminectomy syndrome here for follow-up.    chronic pain from lumbar DDD spondylosis with radicular pain.  Chronic polyarthralgia.  Repeat  right L4-L5 transforaminal JESSICA or SI injection as needed.    Continue hydrocodone 10/25 3 times daily.  UDS and inspect reviewed.  Discussed risk of tolerance, dependence, respiratory depression, coma and death associated with use of oral opioids for treatment of chronic nonmalignant pain.      Continue gabapentin/flexeril.  Cont. topical compounded neuropathic/anti-inflammatory cream with ketamine.      RTC 2-3 mo

## 2025-08-28 DIAGNOSIS — M96.1 POSTLAMINECTOMY SYNDROME OF LUMBAR REGION: ICD-10-CM

## 2025-08-28 RX ORDER — HYDROCODONE BITARTRATE AND ACETAMINOPHEN 10; 325 MG/1; MG/1
1 TABLET ORAL 3 TIMES DAILY PRN
Qty: 90 TABLET | Refills: 0 | Status: SHIPPED | OUTPATIENT
Start: 2025-09-01